# Patient Record
Sex: MALE | Race: WHITE | NOT HISPANIC OR LATINO | Employment: UNEMPLOYED | URBAN - NONMETROPOLITAN AREA
[De-identification: names, ages, dates, MRNs, and addresses within clinical notes are randomized per-mention and may not be internally consistent; named-entity substitution may affect disease eponyms.]

---

## 2021-04-16 ENCOUNTER — TRANSFERRED RECORDS (OUTPATIENT)
Dept: HEALTH INFORMATION MANAGEMENT | Facility: OTHER | Age: 66
End: 2021-04-16

## 2021-06-14 DIAGNOSIS — Z86.73 HISTORY OF STROKE: ICD-10-CM

## 2021-06-14 DIAGNOSIS — I25.10 CORONARY ARTERY DISEASE INVOLVING NATIVE CORONARY ARTERY OF NATIVE HEART WITHOUT ANGINA PECTORIS: ICD-10-CM

## 2021-06-14 DIAGNOSIS — Z13.6 SCREENING FOR AAA (ABDOMINAL AORTIC ANEURYSM): ICD-10-CM

## 2021-06-14 DIAGNOSIS — Z86.74 H/O CARDIAC ARREST: Primary | ICD-10-CM
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
## 2021-06-15 RX ORDER — ACETAMINOPHEN 325 MG/1
325-650 TABLET ORAL EVERY 4 HOURS PRN
COMMUNITY
End: 2023-01-01

## 2021-06-15 RX ORDER — ASPIRIN 81 MG/1
81 TABLET ORAL DAILY
COMMUNITY
End: 2021-08-02

## 2021-06-15 RX ORDER — ATORVASTATIN CALCIUM 80 MG/1
80 TABLET, FILM COATED ORAL AT BEDTIME
COMMUNITY
End: 2023-01-01

## 2021-06-15 RX ORDER — LISINOPRIL 40 MG/1
40 TABLET ORAL DAILY
COMMUNITY
End: 2021-06-17

## 2021-06-15 RX ORDER — WARFARIN SODIUM 2 MG/1
TABLET ORAL
COMMUNITY
End: 2021-12-30

## 2021-06-15 RX ORDER — CHLORTHALIDONE 25 MG/1
12.5 TABLET ORAL DAILY
COMMUNITY
End: 2021-06-17

## 2021-06-15 RX ORDER — GLIPIZIDE 10 MG/1
10 TABLET ORAL
COMMUNITY
End: 2021-06-17

## 2021-06-17 ENCOUNTER — OFFICE VISIT (OUTPATIENT)
Dept: CARDIOLOGY | Facility: OTHER | Age: 66
End: 2021-06-17
Attending: INTERNAL MEDICINE
Payer: COMMERCIAL

## 2021-06-17 VITALS
DIASTOLIC BLOOD PRESSURE: 60 MMHG | RESPIRATION RATE: 18 BRPM | BODY MASS INDEX: 28.77 KG/M2 | TEMPERATURE: 99 F | SYSTOLIC BLOOD PRESSURE: 110 MMHG | HEIGHT: 70 IN | HEART RATE: 84 BPM | OXYGEN SATURATION: 96 % | WEIGHT: 201 LBS

## 2021-06-17 DIAGNOSIS — Z86.2 HISTORY OF ITP: ICD-10-CM

## 2021-06-17 DIAGNOSIS — I65.23 BILATERAL CAROTID ARTERY STENOSIS: ICD-10-CM

## 2021-06-17 DIAGNOSIS — I25.10 CORONARY ARTERY DISEASE INVOLVING NATIVE CORONARY ARTERY OF NATIVE HEART WITHOUT ANGINA PECTORIS: ICD-10-CM

## 2021-06-17 DIAGNOSIS — Z13.6 ENCOUNTER FOR SCREENING FOR ABDOMINAL AORTIC ANEURYSM (AAA) IN PATIENT 50 YEARS OF AGE OR OLDER WITHOUT OTHER RISK FACTORS FOR AAA: ICD-10-CM

## 2021-06-17 DIAGNOSIS — N18.6 ESRD (END STAGE RENAL DISEASE) ON DIALYSIS (H): ICD-10-CM

## 2021-06-17 DIAGNOSIS — E66.01 MORBID OBESITY (H): ICD-10-CM

## 2021-06-17 DIAGNOSIS — E55.9 VITAMIN D DEFICIENCY: ICD-10-CM

## 2021-06-17 DIAGNOSIS — E53.8 VITAMIN B12 DEFICIENCY (NON ANEMIC): ICD-10-CM

## 2021-06-17 DIAGNOSIS — I73.9 PAD (PERIPHERAL ARTERY DISEASE) (H): ICD-10-CM

## 2021-06-17 DIAGNOSIS — E78.1 HYPERTRIGLYCERIDEMIA: ICD-10-CM

## 2021-06-17 DIAGNOSIS — J44.9 CHRONIC OBSTRUCTIVE PULMONARY DISEASE, UNSPECIFIED COPD TYPE (H): ICD-10-CM

## 2021-06-17 DIAGNOSIS — R53.1 LEFT-SIDED WEAKNESS: ICD-10-CM

## 2021-06-17 DIAGNOSIS — Z87.891 HISTORY OF TOBACCO ABUSE: ICD-10-CM

## 2021-06-17 DIAGNOSIS — I50.42 CHRONIC COMBINED SYSTOLIC AND DIASTOLIC CONGESTIVE HEART FAILURE (H): ICD-10-CM

## 2021-06-17 DIAGNOSIS — Z86.73 HISTORY OF CVA (CEREBROVASCULAR ACCIDENT): ICD-10-CM

## 2021-06-17 DIAGNOSIS — I25.2 HISTORY OF ST ELEVATION MYOCARDIAL INFARCTION (STEMI): ICD-10-CM

## 2021-06-17 DIAGNOSIS — L97.529 ULCER OF LEFT FOOT, UNSPECIFIED ULCER STAGE (H): ICD-10-CM

## 2021-06-17 DIAGNOSIS — U07.1 CLINICAL DIAGNOSIS OF COVID-19: ICD-10-CM

## 2021-06-17 DIAGNOSIS — Z86.74 H/O CARDIAC ARREST: Primary | ICD-10-CM

## 2021-06-17 DIAGNOSIS — Z99.2 ESRD (END STAGE RENAL DISEASE) ON DIALYSIS (H): ICD-10-CM

## 2021-06-17 DIAGNOSIS — I27.20 MILD PULMONARY HYPERTENSION (H): ICD-10-CM

## 2021-06-17 DIAGNOSIS — I10 BENIGN ESSENTIAL HYPERTENSION: ICD-10-CM

## 2021-06-17 DIAGNOSIS — G47.33 OSA (OBSTRUCTIVE SLEEP APNEA): ICD-10-CM

## 2021-06-17 DIAGNOSIS — E11.65 TYPE 2 DIABETES MELLITUS WITH HYPERGLYCEMIA, WITHOUT LONG-TERM CURRENT USE OF INSULIN (H): ICD-10-CM

## 2021-06-17 LAB — INTERPRETATION ECG - MUSE: NORMAL

## 2021-06-17 PROCEDURE — 99417 PROLNG OP E/M EACH 15 MIN: CPT | Performed by: INTERNAL MEDICINE

## 2021-06-17 PROCEDURE — G0463 HOSPITAL OUTPT CLINIC VISIT: HCPCS

## 2021-06-17 PROCEDURE — G0463 HOSPITAL OUTPT CLINIC VISIT: HCPCS | Mod: 25

## 2021-06-17 PROCEDURE — 93005 ELECTROCARDIOGRAM TRACING: CPT

## 2021-06-17 PROCEDURE — 93010 ELECTROCARDIOGRAM REPORT: CPT | Performed by: INTERNAL MEDICINE

## 2021-06-17 PROCEDURE — 99205 OFFICE O/P NEW HI 60 MIN: CPT | Performed by: INTERNAL MEDICINE

## 2021-06-17 RX ORDER — CLOPIDOGREL BISULFATE 75 MG/1
75 TABLET ORAL EVERY MORNING
COMMUNITY
End: 2021-08-06

## 2021-06-17 RX ORDER — LANOLIN ALCOHOL/MO/W.PET/CERES
1 CREAM (GRAM) TOPICAL
COMMUNITY
End: 2023-01-01

## 2021-06-17 RX ORDER — POLYETHYLENE GLYCOL 3350 17 G/17G
1 POWDER, FOR SOLUTION ORAL DAILY PRN
COMMUNITY
End: 2023-01-01

## 2021-06-17 RX ORDER — NITROGLYCERIN 0.4 MG/1
0.4 TABLET SUBLINGUAL EVERY 5 MIN PRN
COMMUNITY
End: 2021-08-02

## 2021-06-17 RX ORDER — METOPROLOL TARTRATE 25 MG/1
12.5 TABLET, FILM COATED ORAL 2 TIMES DAILY
COMMUNITY
End: 2021-08-02

## 2021-06-17 SDOH — HEALTH STABILITY: MENTAL HEALTH: HOW OFTEN DO YOU HAVE A DRINK CONTAINING ALCOHOL?: NEVER

## 2021-06-17 ASSESSMENT — PAIN SCALES - GENERAL: PAINLEVEL: NO PAIN (0)

## 2021-06-17 ASSESSMENT — MIFFLIN-ST. JEOR: SCORE: 1692.98

## 2021-06-17 NOTE — PATIENT INSTRUCTIONS
You were seen by  Nick Wallace,      1. Echocardiogram has been ordered. You will be called to schedule this.  You will receive instructions for testing at that time.  You will be contacted with results.     2. Ultrasound of carotid artery (bilateral) has been ordered. You will be called to schedule this.  You will receive instructions for testing at that time.  You will be contacted with results.    3. An ultrasound of your abdomen for aorta screening has been ordered. You will be called to schedule this. You will receive instructions for testing at that time. You will be contacted with results.    4. PFT's (Pulmonary function test) has been ordered. You will be called to schedule this.  You will receive instructions for testing at that time.  You will be contacted with results.    5. A referral has been placed for you to have a sleep study and consult with Dr. Butcher. You will be contacted to schedule these appointments.     6. Please establish care with an internal medicine doctor so you have a primary care provider.    7. You will be contacted to set up a telemed visit at [facility] to see Dr. Toro.  Please check in at [address] for this appointment.    You will follow up with [facility] , sooner if needed.     Please call the cardiology office with problems after testing is done and you've seen Dr. Toro, questions, or concerns at [phone].    If you experience chest pain, chest pressure, chest tightness, shortness of breath, fainting, lightheadedness, nausea, vomiting, or other concerning symptoms, please report to the Emergency Department or call 911. These symptoms may be emergent, and best treated in the Emergency Department.     Cardiology Nurses  SARA Doherty, SARA Amanda, TANVIN  Indy, LPN  [facility] ([address])  [phone]

## 2021-06-17 NOTE — NURSING NOTE
"Patient comes in for consult from hospital stay.  Treasure Meza LPN ....................6/17/2021   9:22 AM  Chief Complaint   Patient presents with     Consult For     hospital follow up       Initial /60 (BP Location: Right arm, Patient Position: Sitting, Cuff Size: Adult Large)   Pulse 84   Temp 99  F (37.2  C) (Tympanic)   Resp 18   Ht 1.778 m (5' 10\")   Wt 91.2 kg (201 lb)   SpO2 96%   BMI 28.84 kg/m   Estimated body mass index is 28.84 kg/m  as calculated from the following:    Height as of this encounter: 1.778 m (5' 10\").    Weight as of this encounter: 91.2 kg (201 lb).  Meds Reconciled: complete  Pt is on Aspirin  Pt is on a Statin  PHQ and/or RANJAN reviewed. Pt referred to PCP/MH Provider as appropriate.    Treasrue Meza LPN      "

## 2021-06-24 ENCOUNTER — ALLIED HEALTH/NURSE VISIT (OUTPATIENT)
Dept: CARDIOLOGY | Facility: OTHER | Age: 66
End: 2021-06-24
Attending: THORACIC SURGERY (CARDIOTHORACIC VASCULAR SURGERY)
Payer: COMMERCIAL

## 2021-06-24 ENCOUNTER — OFFICE VISIT (OUTPATIENT)
Dept: CARDIOLOGY | Facility: CLINIC | Age: 66
End: 2021-06-24
Attending: THORACIC SURGERY (CARDIOTHORACIC VASCULAR SURGERY)
Payer: COMMERCIAL

## 2021-06-24 VITALS
OXYGEN SATURATION: 100 % | WEIGHT: 202.3 LBS | RESPIRATION RATE: 12 BRPM | BODY MASS INDEX: 28.32 KG/M2 | DIASTOLIC BLOOD PRESSURE: 66 MMHG | HEIGHT: 71 IN | TEMPERATURE: 98 F | SYSTOLIC BLOOD PRESSURE: 134 MMHG | HEART RATE: 76 BPM

## 2021-06-24 VITALS
OXYGEN SATURATION: 100 % | HEART RATE: 76 BPM | HEIGHT: 71 IN | RESPIRATION RATE: 12 BRPM | WEIGHT: 202.3 LBS | SYSTOLIC BLOOD PRESSURE: 134 MMHG | TEMPERATURE: 98 F | BODY MASS INDEX: 28.32 KG/M2 | DIASTOLIC BLOOD PRESSURE: 66 MMHG

## 2021-06-24 DIAGNOSIS — I25.10 CORONARY ARTERY DISEASE INVOLVING NATIVE CORONARY ARTERY OF NATIVE HEART WITHOUT ANGINA PECTORIS: Primary | ICD-10-CM

## 2021-06-24 PROCEDURE — 99204 OFFICE O/P NEW MOD 45 MIN: CPT | Mod: TEL | Performed by: THORACIC SURGERY (CARDIOTHORACIC VASCULAR SURGERY)

## 2021-06-24 PROCEDURE — G0463 HOSPITAL OUTPT CLINIC VISIT: HCPCS | Mod: GT

## 2021-06-24 ASSESSMENT — MIFFLIN-ST. JEOR
SCORE: 1714.76
SCORE: 1714.76

## 2021-06-24 ASSESSMENT — PAIN SCALES - GENERAL
PAINLEVEL: NO PAIN (0)
PAINLEVEL: NO PAIN (0)

## 2021-06-24 NOTE — NURSING NOTE
Dr. Toro's office will be in touch after patient has his upcoming testing completed.  Mai Aggarwal RN......June 24, 2021...2:42 PM
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
## 2021-06-24 NOTE — NURSING NOTE
Patient seen today for consultation for CABG consult.    Surgery procedure explained to patient and family member and all questions and concerns were answered and addressed.     Risks and benefits of surgery were discussed with patient (and all present) including risk of death, stroke, bleeding, cardiac ischemia, wound infection, renal failure, arrhythmias and possible pacemaker implantation. Patient accepts these risks and is willing to proceed with surgery.     Reviewed pre surgery tests and procedures needed and will call patient to schedule.      Pre surgery preparation folder with instructions for surgery preparation and recovery will be mailed to the patient.     Instructed patient on having to come to [facility] for surgery.    Patient verbalized understanding of all instructions and will call with any questions or concerns.
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
## 2021-06-24 NOTE — NURSING NOTE
"Denies chest pain/pressure, shortness of breath, lightheadedness, nausea, increased fatigue, syncope or pre-syncope.    Chief Complaint   Patient presents with     Telemedicine consult     discuss need for bypass       Initial /66 (BP Location: Right arm, Patient Position: Sitting, Cuff Size: Adult Regular)   Pulse 76   Temp 98  F (36.7  C) (Temporal)   Resp 12   Ht 1.803 m (5' 11\")   Wt 91.8 kg (202 lb 4.8 oz)   SpO2 100%   BMI 28.22 kg/m   Estimated body mass index is 28.22 kg/m  as calculated from the following:    Height as of this encounter: 1.803 m (5' 11\").    Weight as of this encounter: 91.8 kg (202 lb 4.8 oz).  Medication Reconciliation: complete    Mai Aggarwal RN  "

## 2021-06-25 ENCOUNTER — CARE COORDINATION (OUTPATIENT)
Dept: CARDIOLOGY | Facility: CLINIC | Age: 66
End: 2021-06-25

## 2021-06-25 DIAGNOSIS — I73.9 PAD (PERIPHERAL ARTERY DISEASE) (H): Primary | ICD-10-CM

## 2021-06-25 DIAGNOSIS — Z79.01 LONG TERM CURRENT USE OF ANTICOAGULANT THERAPY: ICD-10-CM

## 2021-06-25 DIAGNOSIS — Z01.810 PRE-OPERATIVE CARDIOVASCULAR EXAMINATION: ICD-10-CM

## 2021-06-25 DIAGNOSIS — I25.10 CORONARY ARTERY DISEASE INVOLVING NATIVE CORONARY ARTERY OF NATIVE HEART WITHOUT ANGINA PECTORIS: ICD-10-CM

## 2021-06-28 ENCOUNTER — OFFICE VISIT (OUTPATIENT)
Dept: INTERNAL MEDICINE | Facility: OTHER | Age: 66
End: 2021-06-28
Attending: NURSE PRACTITIONER
Payer: COMMERCIAL

## 2021-06-28 VITALS
TEMPERATURE: 97.3 F | WEIGHT: 204.2 LBS | BODY MASS INDEX: 28.48 KG/M2 | SYSTOLIC BLOOD PRESSURE: 130 MMHG | OXYGEN SATURATION: 97 % | RESPIRATION RATE: 16 BRPM | DIASTOLIC BLOOD PRESSURE: 70 MMHG | HEART RATE: 80 BPM

## 2021-06-28 DIAGNOSIS — E11.65 TYPE 2 DIABETES MELLITUS WITH HYPERGLYCEMIA, WITHOUT LONG-TERM CURRENT USE OF INSULIN (H): ICD-10-CM

## 2021-06-28 DIAGNOSIS — J44.9 CHRONIC OBSTRUCTIVE PULMONARY DISEASE, UNSPECIFIED COPD TYPE (H): Primary | ICD-10-CM

## 2021-06-28 DIAGNOSIS — R53.1 LEFT-SIDED WEAKNESS: ICD-10-CM

## 2021-06-28 DIAGNOSIS — Z01.810 PRE-OPERATIVE CARDIOVASCULAR EXAMINATION: ICD-10-CM

## 2021-06-28 DIAGNOSIS — Z86.73 HISTORY OF CVA (CEREBROVASCULAR ACCIDENT): ICD-10-CM

## 2021-06-28 DIAGNOSIS — L97.529 ULCER OF LEFT FOOT, UNSPECIFIED ULCER STAGE (H): ICD-10-CM

## 2021-06-28 DIAGNOSIS — I25.2 HISTORY OF ST ELEVATION MYOCARDIAL INFARCTION (STEMI): ICD-10-CM

## 2021-06-28 DIAGNOSIS — I73.9 PAD (PERIPHERAL ARTERY DISEASE) (H): Primary | ICD-10-CM

## 2021-06-28 DIAGNOSIS — I50.42 CHRONIC COMBINED SYSTOLIC AND DIASTOLIC CONGESTIVE HEART FAILURE (H): ICD-10-CM

## 2021-06-28 DIAGNOSIS — I73.9 PAD (PERIPHERAL ARTERY DISEASE) (H): ICD-10-CM

## 2021-06-28 DIAGNOSIS — I25.10 CORONARY ARTERY DISEASE INVOLVING NATIVE CORONARY ARTERY OF NATIVE HEART WITHOUT ANGINA PECTORIS: ICD-10-CM

## 2021-06-28 DIAGNOSIS — N18.6 ESRD (END STAGE RENAL DISEASE) ON DIALYSIS (H): ICD-10-CM

## 2021-06-28 DIAGNOSIS — I27.20 MILD PULMONARY HYPERTENSION (H): ICD-10-CM

## 2021-06-28 DIAGNOSIS — I10 BENIGN ESSENTIAL HYPERTENSION: ICD-10-CM

## 2021-06-28 DIAGNOSIS — Z13.29 SCREENING FOR THYROID DISORDER: ICD-10-CM

## 2021-06-28 DIAGNOSIS — Z99.2 ESRD (END STAGE RENAL DISEASE) ON DIALYSIS (H): ICD-10-CM

## 2021-06-28 DIAGNOSIS — Z86.2 HISTORY OF ITP: ICD-10-CM

## 2021-06-28 DIAGNOSIS — G47.33 OSA (OBSTRUCTIVE SLEEP APNEA): ICD-10-CM

## 2021-06-28 DIAGNOSIS — Z86.73 HISTORY OF STROKE: ICD-10-CM

## 2021-06-28 DIAGNOSIS — Z86.74 H/O CARDIAC ARREST: ICD-10-CM

## 2021-06-28 DIAGNOSIS — I65.23 BILATERAL CAROTID ARTERY STENOSIS: ICD-10-CM

## 2021-06-28 DIAGNOSIS — E78.1 HYPERTRIGLYCERIDEMIA: ICD-10-CM

## 2021-06-28 DIAGNOSIS — Z11.59 ENCOUNTER FOR HEPATITIS C SCREENING TEST FOR LOW RISK PATIENT: ICD-10-CM

## 2021-06-28 LAB
ALBUMIN SERPL-MCNC: 3.9 G/DL (ref 3.5–5.7)
ALP SERPL-CCNC: 70 U/L (ref 34–104)
ALT SERPL W P-5'-P-CCNC: 8 U/L (ref 7–52)
ANION GAP SERPL CALCULATED.3IONS-SCNC: 13 MMOL/L (ref 3–14)
AST SERPL W P-5'-P-CCNC: 11 U/L (ref 13–39)
BASOPHILS # BLD AUTO: 0.1 10E9/L (ref 0–0.2)
BASOPHILS NFR BLD AUTO: 0.7 %
BILIRUB SERPL-MCNC: 0.5 MG/DL (ref 0.3–1)
BUN SERPL-MCNC: 59 MG/DL (ref 7–25)
CALCIUM SERPL-MCNC: 9.8 MG/DL (ref 8.6–10.3)
CHLORIDE SERPL-SCNC: 99 MMOL/L (ref 98–107)
CHOLEST SERPL-MCNC: 127 MG/DL
CO2 SERPL-SCNC: 28 MMOL/L (ref 21–31)
CREAT SERPL-MCNC: 8.12 MG/DL (ref 0.7–1.3)
DIFFERENTIAL METHOD BLD: ABNORMAL
EOSINOPHIL # BLD AUTO: 0.3 10E9/L (ref 0–0.7)
EOSINOPHIL NFR BLD AUTO: 3.7 %
ERYTHROCYTE [DISTWIDTH] IN BLOOD BY AUTOMATED COUNT: 17.9 % (ref 10–15)
GFR SERPL CREATININE-BSD FRML MDRD: 7 ML/MIN/{1.73_M2}
GLUCOSE SERPL-MCNC: 81 MG/DL (ref 70–105)
HBA1C MFR BLD: 6.1 % (ref 4–6)
HCT VFR BLD AUTO: 32.9 % (ref 40–53)
HDLC SERPL-MCNC: 37 MG/DL (ref 23–92)
HGB BLD-MCNC: 10.1 G/DL (ref 13.3–17.7)
IMM GRANULOCYTES # BLD: 0.1 10E9/L (ref 0–0.4)
IMM GRANULOCYTES NFR BLD: 0.7 %
LDLC SERPL CALC-MCNC: 63 MG/DL
LYMPHOCYTES # BLD AUTO: 2 10E9/L (ref 0.8–5.3)
LYMPHOCYTES NFR BLD AUTO: 26.1 %
MCH RBC QN AUTO: 27.2 PG (ref 26.5–33)
MCHC RBC AUTO-ENTMCNC: 30.7 G/DL (ref 31.5–36.5)
MCV RBC AUTO: 88 FL (ref 78–100)
MONOCYTES # BLD AUTO: 0.6 10E9/L (ref 0–1.3)
MONOCYTES NFR BLD AUTO: 8.4 %
NEUTROPHILS # BLD AUTO: 4.5 10E9/L (ref 1.6–8.3)
NEUTROPHILS NFR BLD AUTO: 60.4 %
NONHDLC SERPL-MCNC: 90 MG/DL
PLATELET # BLD AUTO: 190 10E9/L (ref 150–450)
POTASSIUM SERPL-SCNC: 5.1 MMOL/L (ref 3.5–5.1)
PROT SERPL-MCNC: 7.1 G/DL (ref 6.4–8.9)
RBC # BLD AUTO: 3.72 10E12/L (ref 4.4–5.9)
SODIUM SERPL-SCNC: 140 MMOL/L (ref 134–144)
TRIGL SERPL-MCNC: 135 MG/DL
TSH SERPL DL<=0.05 MIU/L-ACNC: 1.61 IU/ML (ref 0.34–5.6)
WBC # BLD AUTO: 7.5 10E9/L (ref 4–11)

## 2021-06-28 PROCEDURE — 80053 COMPREHEN METABOLIC PANEL: CPT | Mod: ZL | Performed by: NURSE PRACTITIONER

## 2021-06-28 PROCEDURE — 86803 HEPATITIS C AB TEST: CPT | Mod: ZL | Performed by: NURSE PRACTITIONER

## 2021-06-28 PROCEDURE — 85025 COMPLETE CBC W/AUTO DIFF WBC: CPT | Mod: ZL | Performed by: NURSE PRACTITIONER

## 2021-06-28 PROCEDURE — 84443 ASSAY THYROID STIM HORMONE: CPT | Mod: ZL | Performed by: NURSE PRACTITIONER

## 2021-06-28 PROCEDURE — 36415 COLL VENOUS BLD VENIPUNCTURE: CPT | Mod: ZL | Performed by: NURSE PRACTITIONER

## 2021-06-28 PROCEDURE — 80061 LIPID PANEL: CPT | Mod: ZL | Performed by: NURSE PRACTITIONER

## 2021-06-28 PROCEDURE — 83036 HEMOGLOBIN GLYCOSYLATED A1C: CPT | Mod: ZL | Performed by: NURSE PRACTITIONER

## 2021-06-28 PROCEDURE — G0463 HOSPITAL OUTPT CLINIC VISIT: HCPCS

## 2021-06-28 PROCEDURE — 99203 OFFICE O/P NEW LOW 30 MIN: CPT | Performed by: NURSE PRACTITIONER

## 2021-06-28 ASSESSMENT — ENCOUNTER SYMPTOMS
ACTIVITY CHANGE: 1
APPETITE CHANGE: 1
WEAKNESS: 1

## 2021-06-28 ASSESSMENT — PAIN SCALES - GENERAL: PAINLEVEL: NO PAIN (0)

## 2021-06-28 NOTE — NURSING NOTE
"Chief Complaint   Patient presents with     [facility]     FOOD SECURITY SCREENING QUESTIONS  Hunger Vital Signs:  Within the past 12 months we worried whether our food would run out before we got money to buy more. Never  Within the past 12 months the food we bought just didn't last and we didn't have money to get more. Never     Initial /70 (BP Location: Right arm, Patient Position: Sitting, Cuff Size: Adult Regular)   Pulse 80   Temp 97.3  F (36.3  C) (Tympanic)   Resp 16   Wt 92.6 kg (204 lb 3.2 oz)   SpO2 97%   BMI 28.48 kg/m   Estimated body mass index is 28.48 kg/m  as calculated from the following:    Height as of 6/24/21: 1.803 m (5' 11\").    Weight as of this encounter: 92.6 kg (204 lb 3.2 oz).  Medication Reconciliation: complete    Pamela Dutton LPN    "

## 2021-06-28 NOTE — PROGRESS NOTES
Spoke to patient and he would like to wait until after his 08/03 appointment with Dr Wallace to schedule surgery.  Per Dr Toro patient can wait if he chooses.  Will call patient after he see Dr Wallace on 08/03 and discuss surgery appointment.  Patient will need: Labs, EKG, CT w/o contrast, chest x-ray and vein mapping prior to surgery.  No availability for vein mapping in [facility] until late August.  Note sent to Dr Wallace with patient's request.     Scheduled surgery for August 10th and pre op for August 9th to have patient on the schedule.  In needed will reschedule.  Spoke to patient and he agreed to plan.

## 2021-06-28 NOTE — LETTER
July 2, 2021      Miguel Parisi  [address] [facility] [address]        Dear ,    We are writing to inform you of your test results.    Resulted Orders   Lipid Profile   Result Value Ref Range    Cholesterol 127 <200 mg/dL    Triglycerides 135 <150 mg/dL    HDL Cholesterol 37 23 - 92 mg/dL    LDL Cholesterol Calculated 63 <100 mg/dL      Comment:      Desirable:       <100 mg/dl    Non HDL Cholesterol 90 <130 mg/dL   Thyrotropin GH   Result Value Ref Range    Thyrotropin 1.61 0.34 - 5.60 IU/mL   Hepatitis C antibody   Result Value Ref Range    Hepatitis C Antibody Nonreactive NR^Nonreactive      Comment:      Assay performance characteristics have not been established for newborns,   infants, and children     Comprehensive metabolic panel   Result Value Ref Range    Sodium 140 134 - 144 mmol/L    Potassium 5.1 3.5 - 5.1 mmol/L    Chloride 99 98 - 107 mmol/L    Carbon Dioxide 28 21 - 31 mmol/L    Anion Gap 13 3 - 14 mmol/L    Glucose 81 70 - 105 mg/dL    Urea Nitrogen 59 (H) 7 - 25 mg/dL    Creatinine 8.12 (HH) 0.70 - 1.30 mg/dL      Comment:      Critical Value called to and read back by  LYNN RUIZ, 06.28.21, 1500, [facility]      GFR Estimate 7 (L) >60 mL/min/[1.73_m2]    GFR Estimate If Black 8 (L) >60 mL/min/[1.73_m2]    Calcium 9.8 8.6 - 10.3 mg/dL    Bilirubin Total 0.5 0.3 - 1.0 mg/dL    Albumin 3.9 3.5 - 5.7 g/dL    Protein Total 7.1 6.4 - 8.9 g/dL    Alkaline Phosphatase 70 34 - 104 U/L    ALT 8 7 - 52 U/L    AST 11 (L) 13 - 39 U/L   CBC with platelets differential   Result Value Ref Range    WBC 7.5 4.0 - 11.0 10e9/L    RBC Count 3.72 (L) 4.4 - 5.9 10e12/L    Hemoglobin 10.1 (L) 13.3 - 17.7 g/dL    Hematocrit 32.9 (L) 40.0 - 53.0 %    MCV 88 78 - 100 fl    MCH 27.2 26.5 - 33.0 pg    MCHC 30.7 (L) 31.5 - 36.5 g/dL    RDW 17.9 (H) 10.0 - 15.0 %    Platelet Count 190 150 - 450 10e9/L    Diff Method Automated Method     % Neutrophils 60.4 %    % Lymphocytes 26.1 %    % Monocytes 8.4 %    % Eosinophils 3.7  %    % Basophils 0.7 %    % Immature Granulocytes 0.7 %    Absolute Neutrophil 4.5 1.6 - 8.3 10e9/L    Absolute Lymphocytes 2.0 0.8 - 5.3 10e9/L    Absolute Monocytes 0.6 0.0 - 1.3 10e9/L    Absolute Eosinophils 0.3 0.0 - 0.7 10e9/L    Absolute Basophils 0.1 0.0 - 0.2 10e9/L    Abs Immature Granulocytes 0.1 0 - 0.4 10e9/L   Hemoglobin A1c   Result Value Ref Range    Hemoglobin A1C 6.1 (H) 4.0 - 6.0 %       If you have any questions or concerns, please call the clinic at the number listed above.       Sincerely,      Danii Ortiz NP

## 2021-06-28 NOTE — LETTER
My COPD Action Plan     Name: Miguel Parisi    YOB: 1955   Date: 6/28/2021    My doctor: Danii Ortiz NP   My clinic: [facility]    [address]  [place] [id number] [phone]  My Controller Medicine:    Dose:      My Rescue Medicine:    Dose:      My Flare Up Medicine:    Dose:      My COPD Severity: Mild = FeV1 > 80%      Use of Oxygen:      Make sure you've had your pneumonia   vaccines.          GREEN ZONE       Doing well today      Usual level of activity and exercise    Usual amount of cough and mucus    No shortness of breath    Usual level of health (thinking clearly, sleeping well, feel like eating) Actions:      Take daily medicines    Use oxygen as prescribed    Follow regular exercise and diet plan    Avoid cigarette smoke and other irritants that harm the lungs           YELLOW ZONE          Having a bad day or flare up      Short of breath more than usual    A lot more sputum (mucus) than usual    Sputum looks yellow, green, tan, brown or bloody    More coughing or wheezing    Fever or chills    Less energy; trouble completing activities    Trouble thinking or focusing    Using quick relief inhaler or nebulizer more often    Poor sleep; symptoms wake me up    Do not feel like eating Actions:      Get plenty of rest    Take daily medicines    Use quick relief inhaler every N/A hours    If you use oxygen, call you doctor to see if you should adjust your oxygen    Do breathing exercises or other things to help you relax    Let a loved one, friend or neighbor know you are feeling worse    Call your care team if you have 2 or more symptoms.  Start taking steroids or antibiotics if directed by your care team           RED ZONE       Need medical care now      Severe shortness of breath (feel you can't breathe)    Fever, chills    Not enough breath to do any activity    Trouble coughing up mucus, walking or talking    Blood in  mucus    Frequent coughing   Rescue medicines are not working    Not able to sleep because of breathing    Feel confused or drowsy    Chest pain    Actions:      Call your health care team.  If you cannot reach your care team, call 911 or go to the emergency room.        Annual Reminders:  Meet with Care Team, Flu Shot every Fall  Pharmacy: [id number] - [address]

## 2021-06-28 NOTE — PROGRESS NOTES
"Miguel Parisi  : 1955 Age: 65 year old Sex: male MRN: [id number]    CC:   Chief Complaint   Patient presents with     Establish Care     Med recheck     RANJAN Score:    No flowsheet data found.    PHQ-2 Score:     PHQ-2 (  [employer]) 2021   Q1: Little interest or pleasure in doing things 0 0   Q2: Feeling down, depressed or hopeless 0 0   PHQ-2 Score 0 0          Tobacco Use      Smoking status: Former Smoker        Packs/day: 1.00        Types: Cigarettes        Quit date: 1990        Years since quittin.5      Smokeless tobacco: Never Used    NURSE'S NOTES:    Nursing Notes:   Pamela Dutton LPN  2021  1:40 PM  Signed  Chief Complaint   Patient presents with     Establish Care     Med recheck     FOOD SECURITY SCREENING QUESTIONS  Hunger Vital Signs:  Within the past 12 months we worried whether our food would run out before we got money to buy more. Never  Within the past 12 months the food we bought just didn't last and we didn't have money to get more. Never     Initial /70 (BP Location: Right arm, Patient Position: Sitting, Cuff Size: Adult Regular)   Pulse 80   Temp 97.3  F (36.3  C) (Tympanic)   Resp 16   Wt 92.6 kg (204 lb 3.2 oz)   SpO2 97%   BMI 28.48 kg/m   Estimated body mass index is 28.48 kg/m  as calculated from the following:    Height as of 21: 1.803 m (5' 11\").    Weight as of this encounter: 92.6 kg (204 lb 3.2 oz).  Medication Reconciliation: complete    Pamela Dutton LPN       Nursing note reviewed with patient.  Accuracy and completeness verified.      SUBJECTIVE:                                                      HPI:   Miguel Parisi presents to the clinic today accompanied by his teenage grandson to establish care with the [facility] team and for medication recheck.     Has a history of end-stage renal disease currently on dialysis 3 times a week.  He does report today he is able to void on his own if he drinks enough " "water.    He has a history of left sided weakness as a result of CVA in November 2020, a previous acute ischemic stroke with L-sided numbness/ataxia. Takes a daily aspirin and warfarin 2 mg. INR is monitored currently by [facility]\".    He has a history of obesity. Has lost quite a bit of weight.    He has a history of DM2 with microalbuminuria and mild non-proliferative retinopathy. Takes a daily aspirin. Does not want to use insulin. He won't take metformin as he has diarrhea from this.  He does not check his blood sugars at home. He does report he has had episodes of low blood sugar.    He has a history of PVD s/p R SFA peroneal bypass, left foot ulcer, polyneuropathy. Takes a daily aspirin.    He has a history of KEISHA, but is not on CPAP. He is not interested in this at all. Has tried CPAP before and says it \"just don't work\". Feels he sleeps good. Uses melatonin for sleep.    He has a history of COPD related to years of smoking.  He is not on any inhalers and refuses this.    He has a history of intermittent claudication, PAD, HTN, bilateral carotid artery stenosis. He will follow with Dr. Wallace here at [facility]. Takes a daily aspirin.    He did have COVID illness in 11/20 (asymptomatic basically per wife/ never hospitalized for COVID resp symptoms).     He is on chronic anticoagulation for likely cardioembolic stroke (which was chema-his COVID illness in 11/2020). Takes a daily aspirin.     He was admitted on 4/16/2021 from ED to the cath lab with STEMI and out of hospital cardiac arrest, shock 2 times in the field for VT/VF. Under went TTM with icy cath, had cardiogenic shock vs early concomitant septic shock from asp PNA, required IMPELLA support post arrest, noted severe MV- CAD on LHC. Needs CABG but needs to medically stabilize/rehabilitate prior to that. He had a prolonged, complicated hospital course. Takes a daily aspirin.    Miguel Parisi also presents with:    Patient Active Problem List "   Diagnosis     H/O cardiac arrest     Coronary artery disease involving native coronary artery of native heart without angina pectoris     History of stroke     H/O STEMI on 4/15/2021     Benign essential hypertension     Morbid obesity (H)     Type 2 diabetes mellitus with hyperglycemia, without long-term current use of insulin (H)     Hypertriglyceridemia     History of CVA on 11/16/2020     Chronic combined systolic and diastolic congestive heart failure (H)     Clinical diagnosis of COVID-19 on 11/9/2020     ESRD (end stage renal disease) on dialysis (H)     KEISHA (obstructive sleep apnea)     Mild pulmonary hypertension (H)     PAD (peripheral artery disease) (H)     Bilateral carotid artery stenosis     History of ITP on 4/16/2021     Ulcer of left foot(H)     Left-sided weakness     Chronic obstructive pulmonary disease, unspecified COPD type (H)       Current Code Status:  No Order    REVIEW OF SYSTEMS:    Review of Systems   Constitutional: Positive for activity change (improved) and appetite change (making better food choices).   Eyes:        Last eye exam was last fall, goes annually. Robby Bee.   Cardiovascular: Positive for leg swelling.   Genitourinary:        ESRD, currently on dialysis 3x/week. Is able to make urine on his own if he drinks plenty of water.   Musculoskeletal:        Left sided weakness   Neurological: Positive for weakness (left sided due to CVA).   Psychiatric/Behavioral:        Uses melatonin for sleep.   All other systems reviewed and are negative.      Problem List/PMH: Reviewed in EMR, and made relevant updates today.  Medications: Reviewed in EMR, and made relevant updates today.  Allergies: Reviewed in EMR, and made relevant updates today.    OBJECTIVE:                                                      /70 (BP Location: Right arm, Patient Position: Sitting, Cuff Size: Adult Regular)   Pulse 80   Temp 97.3  F (36.3  C) (Tympanic)   Resp 16   Wt 92.6 kg (204 lb 3.2  oz)   SpO2 97%   BMI 28.48 kg/m      Current Pain Score:   No Pain (0)     Physical Exam  Vitals signs and nursing note reviewed.   Constitutional:       Appearance: Normal appearance.   HENT:      Head: Normocephalic and atraumatic.      Mouth/Throat:      Mouth: Mucous membranes are moist.      Pharynx: Oropharynx is clear.   Eyes:      Extraocular Movements: Extraocular movements intact.      Conjunctiva/sclera: Conjunctivae normal.      Pupils: Pupils are equal, round, and reactive to light.      Comments: Wears glasses   Cardiovascular:      Rate and Rhythm: Normal rate and regular rhythm.      Heart sounds: Normal heart sounds.      Comments: Has a dialysis access in right upper chest  Pulmonary:      Effort: Pulmonary effort is normal.      Breath sounds: Normal breath sounds.   Abdominal:      General: Bowel sounds are normal.      Palpations: Abdomen is soft.      Comments: Rounded   Musculoskeletal:      Right lower leg: Edema present.      Left lower leg: Edema present.      Comments: Sitting in wheelchair for exam exam   Skin:     General: Skin is warm and dry.   Neurological:      Mental Status: He is alert and oriented to person, place, and time. Mental status is at baseline.      Motor: Weakness present.      Gait: Gait abnormal.   Psychiatric:         Mood and Affect: Mood normal.         Behavior: Behavior normal.         Thought Content: Thought content normal.         Judgment: Judgment normal.      Diabetic foot exam: normal DP and PT pulses, no trophic changes or ulcerative lesions, normal sensory exam, normal monofilament exam and nail exam dystrophic nails, right 2nd toenail is dark red.    BP Readings from Last 3 Encounters:   06/28/21 130/70   06/24/21 134/66   06/24/21 134/66      Vitals:    06/28/21 1326   Weight: 92.6 kg (204 lb 3.2 oz)      The ASCVD Risk score ([place] MILENA Shin., et al., 2013) failed to calculate for the following reasons:    The patient has a prior MI or stroke  diagnosis    Diagnostics Completed at this Visit:    Results for orders placed or performed in visit on 06/28/21   Lipid Profile     Status: None   Result Value Ref Range    Cholesterol 127 <200 mg/dL    Triglycerides 135 <150 mg/dL    HDL Cholesterol 37 23 - 92 mg/dL    LDL Cholesterol Calculated 63 <100 mg/dL    Non HDL Cholesterol 90 <130 mg/dL   Thyrotropin GH     Status: None   Result Value Ref Range    Thyrotropin 1.61 0.34 - 5.60 IU/mL   Hepatitis C antibody     Status: None   Result Value Ref Range    Hepatitis C Antibody Nonreactive NR^Nonreactive   Comprehensive metabolic panel     Status: Abnormal   Result Value Ref Range    Sodium 140 134 - 144 mmol/L    Potassium 5.1 3.5 - 5.1 mmol/L    Chloride 99 98 - 107 mmol/L    Carbon Dioxide 28 21 - 31 mmol/L    Anion Gap 13 3 - 14 mmol/L    Glucose 81 70 - 105 mg/dL    Urea Nitrogen 59 (H) 7 - 25 mg/dL    Creatinine 8.12 (HH) 0.70 - 1.30 mg/dL    GFR Estimate 7 (L) >60 mL/min/[1.73_m2]    GFR Estimate If Black 8 (L) >60 mL/min/[1.73_m2]    Calcium 9.8 8.6 - 10.3 mg/dL    Bilirubin Total 0.5 0.3 - 1.0 mg/dL    Albumin 3.9 3.5 - 5.7 g/dL    Protein Total 7.1 6.4 - 8.9 g/dL    Alkaline Phosphatase 70 34 - 104 U/L    ALT 8 7 - 52 U/L    AST 11 (L) 13 - 39 U/L   CBC with platelets differential     Status: Abnormal   Result Value Ref Range    WBC 7.5 4.0 - 11.0 10e9/L    RBC Count 3.72 (L) 4.4 - 5.9 10e12/L    Hemoglobin 10.1 (L) 13.3 - 17.7 g/dL    Hematocrit 32.9 (L) 40.0 - 53.0 %    MCV 88 78 - 100 fl    MCH 27.2 26.5 - 33.0 pg    MCHC 30.7 (L) 31.5 - 36.5 g/dL    RDW 17.9 (H) 10.0 - 15.0 %    Platelet Count 190 150 - 450 10e9/L    Diff Method Automated Method     % Neutrophils 60.4 %    % Lymphocytes 26.1 %    % Monocytes 8.4 %    % Eosinophils 3.7 %    % Basophils 0.7 %    % Immature Granulocytes 0.7 %    Absolute Neutrophil 4.5 1.6 - 8.3 10e9/L    Absolute Lymphocytes 2.0 0.8 - 5.3 10e9/L    Absolute Monocytes 0.6 0.0 - 1.3 10e9/L    Absolute Eosinophils 0.3 0.0  - 0.7 10e9/L    Absolute Basophils 0.1 0.0 - 0.2 10e9/L    Abs Immature Granulocytes 0.1 0 - 0.4 10e9/L   Hemoglobin A1c     Status: Abnormal   Result Value Ref Range    Hemoglobin A1C 6.1 (H) 4.0 - 6.0 %      ASSESSMENT AND PLAN:    Chronic obstructive pulmonary disease, unspecified COPD type (H)  Chronic. Refuses inhalers.    Type 2 diabetes mellitus with hyperglycemia, without long-term current use of insulin (H)  - Albumin Random Urine Quantitative with Creat Ratio  - Hemoglobin A1c, slightly above goal range at 6.1.  Continue to work on this.  Patient does not want to ever take Metformin as it gave him the diarrhea.    - CBC with platelets differential, slightly decreased RBC count at 3.72, hemoglobin is down to 10.1, hematocrit is down to 32.9, MCHC is slightly low at 30.7, RDW's are slightly elevated at 17.9 otherwise unremarkable  - CBC with platelets differential    Hypertriglyceridemia  - Lipid Profile, in normal range. Recheck annually.    Coronary artery disease involving native coronary artery of native heart without angina pectoris  - Albumin Random Urine Quantitative with Creat Ratio  - Hemoglobin A1c, as above  - CBC with platelets differential, as above  - Comprehensive metabolic panel, as above  - Hepatitis C antibody, non reactive  - Thyrotropin GH, in normal range  - Lipid Profile, as above    Benign essential hypertension  - Albumin Random Urine Quantitative with Creat Ratio  - Hemoglobin A1c, as above  - CBC with platelets differential, as above  - Comprehensive metabolic panel, as above  - Hepatitis C antibody  - Thyrotropin GH, as above  - Lipid Profile, as above    Chronic combined systolic and diastolic congestive heart failure (H)  - Albumin Random Urine Quantitative with Creat Ratio  - Hemoglobin A1c, as above  - CBC with platelets differential  - Comprehensive metabolic panel  - Hepatitis C antibody, as above  - Thyrotropin GH, as above  - Lipid Profile, as above    Mild pulmonary  hypertension (H)    PAD (peripheral artery disease) (H)  - Comprehensive metabolic panel, as above    Bilateral carotid artery stenosis    Ulcer of left foot(H)  Resolved    ESRD (end stage renal disease) on dialysis (H)  - Comprehensive metabolic panel, as above  Critical creatinine today. Has dialysis later today    H/O cardiac arrest  History of stroke  H/O STEMI on 4/15/2021  History of CVA on 11/16/2020  History of ITP on 4/16/2021    Left-sided weakness    KEISHA (obstructive sleep apnea)    Screening for thyroid disorder  - Thyrotropin GH, as above. Recheck annually.    Encounter for hepatitis C screening test for low risk patient  - Hepatitis C antibody, as above       I explained my diagnostic considerations and recommendations to the patient, who voiced understanding and agreement with the treatment plan. All questions were answered. We discussed potential side effects of any prescribed or recommended therapies, as well as expectations for response to treatments.    Patient was advised to allow up to 2 days for response on lab results via MyChart and or letter to be sent.    FOLLOW-UP:    Return if symptoms worsen or fail to improve.     Clinic : [phone]  Appointment line: [phone]     VIBHA Baker, AGNP-C  Internal Medicine  07/02/2021 10:23 AM  _____________________________________________________________________________________    Total time spent with this patient was 35 minutes which included chart review, visualization and interpretation of labs and/or images, time spent with patient, and documentation.

## 2021-06-29 ENCOUNTER — TELEPHONE (OUTPATIENT)
Dept: CARDIOLOGY | Facility: OTHER | Age: 66
End: 2021-06-29

## 2021-06-29 LAB — HCV AB SERPL QL IA: NONREACTIVE

## 2021-06-29 NOTE — TELEPHONE ENCOUNTER
Wife Rosalind verified pts .  Called re:  Stress test appt for 7/15/21 at 0800.  That test per  will not be needed.  He has US tests that day.  His first appointment will be at 11:00 on 7/15/21.  Wife verbalized stress test cancelled.  So first appointment is at 11:00 on 7/15/21.

## 2021-06-29 NOTE — TELEPHONE ENCOUNTER
FUTURE VISIT INFORMATION      SURGERY INFORMATION:    Date: tbd- CardiologyRenetta Toro    Consult: ov     RECORDS REQUESTED FROM:       Primary Care Provider: Rachael    Pertinent Medical History: jaren, copd, cad, hypertension, pad, bilateral carotid artery stenosis    Most recent EKG+ Tracin21    Most recent ECHO: 21    Most recent Cardiac Stress Test: 21    Most recent PFT's: 21    Most recent Sleep Study:  2/17/15- Rachael
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
## 2021-07-02 ENCOUNTER — HOSPITAL ENCOUNTER (INPATIENT)
Facility: CLINIC | Age: 66
Setting detail: SURGERY ADMIT
End: 2021-07-02
Attending: THORACIC SURGERY (CARDIOTHORACIC VASCULAR SURGERY) | Admitting: THORACIC SURGERY (CARDIOTHORACIC VASCULAR SURGERY)
Payer: COMMERCIAL

## 2021-07-02 DIAGNOSIS — I25.10 CORONARY ARTERY DISEASE INVOLVING NATIVE CORONARY ARTERY OF NATIVE HEART WITHOUT ANGINA PECTORIS: Primary | ICD-10-CM

## 2021-07-04 DIAGNOSIS — Z11.59 ENCOUNTER FOR SCREENING FOR OTHER VIRAL DISEASES: ICD-10-CM

## 2021-07-15 ENCOUNTER — HOSPITAL ENCOUNTER (OUTPATIENT)
Dept: CARDIOLOGY | Facility: OTHER | Age: 66
End: 2021-07-15
Attending: INTERNAL MEDICINE
Payer: COMMERCIAL

## 2021-07-15 ENCOUNTER — HOSPITAL ENCOUNTER (OUTPATIENT)
Dept: ULTRASOUND IMAGING | Facility: OTHER | Age: 66
End: 2021-07-15
Attending: INTERNAL MEDICINE
Payer: COMMERCIAL

## 2021-07-15 DIAGNOSIS — I25.10 CORONARY ARTERY DISEASE INVOLVING NATIVE CORONARY ARTERY OF NATIVE HEART WITHOUT ANGINA PECTORIS: ICD-10-CM

## 2021-07-15 DIAGNOSIS — Z13.6 SCREENING FOR AAA (ABDOMINAL AORTIC ANEURYSM): ICD-10-CM

## 2021-07-15 DIAGNOSIS — Z86.73 HISTORY OF STROKE: ICD-10-CM

## 2021-07-15 DIAGNOSIS — Z86.74 H/O CARDIAC ARREST: ICD-10-CM

## 2021-07-15 LAB
BI-PLANE LVEF ECHO: NORMAL
LVEF ECHO: NORMAL

## 2021-07-15 PROCEDURE — 93880 EXTRACRANIAL BILAT STUDY: CPT

## 2021-07-15 PROCEDURE — 255N000002 HC RX 255 OP 636: Performed by: INTERNAL MEDICINE

## 2021-07-15 PROCEDURE — 93306 TTE W/DOPPLER COMPLETE: CPT | Mod: 26 | Performed by: INTERNAL MEDICINE

## 2021-07-15 PROCEDURE — 999N000208 ECHOCARDIOGRAM COMPLETE

## 2021-07-15 PROCEDURE — 76706 US ABDL AORTA SCREEN AAA: CPT

## 2021-07-15 RX ADMIN — PERFLUTREN 7 ML: 6.52 INJECTION, SUSPENSION INTRAVENOUS at 13:37

## 2021-07-15 NOTE — LETTER
July 15, 2021      Ilana Parisi  [address] [facility] [address]        Dear ,    We are writing to inform you of your test results.    Here are your echo results.  Your echo shows your heart function is severely decreased at 30-35%.  A normal heart function is between 55-70%.  This suggest you have severe heart failure.  I suspect this is in part due to all the blockages in the arteries of your heart.  I am hopeful that with bypass, your heart function will improve.  However, only time will tell.  Also, it shows that the blood pressure in the arteries of your lungs is elevated.  We call this pulmonary hypertension.  It also shows that one of your valves which is the aortic valve is mildly leaky.      Resulted Orders   Echocardiogram Complete   Result Value Ref Range    Biplane LVEF 34%     LVEF  30-35% (moderately reduced)     [facility]    [id number]  [id number]  [id number]  [id number]^GONZALO^ALEKSANDAR^KARRI     [facility]  [address] [place] [address]     Name: ILANA PARISI  MRN: [id number]  : 1955  Study Date: 07/15/2021 12:30 PM  Age: 65 yrs  Gender: Male  Patient Location: [facility]  Reason For Study: H/O cardiac arrest, Coronary artery disease involving native  cor  Ordering Physician: ALEKSANDAR SÁNCHEZ  Referring Physician: ALEKSANDAR SÁNCHEZ  Performed By: Lauren Celaya RDCS     BSA: 2.1 m2  Height: 71 in  Weight: 204 lb  BP: 130/70 mmHg  ______________________________________________________________________________  Procedure  Echocardiogram with two-dimensional, color and spectral Doppler performed.  Contrast Definity. Definity (NDC #60393-858-63) given intravenously. Patient  was given 7ml mixture of 1.5ml Definity and 8.5ml saline. 3 ml wasted. IV  start location LAC .  ______________________________________________________________________________  Interpretation Summary  Left ventricular function is moderately reduced. Left ventricular function  is  decreased; biplane LVEF is 34%. Diffuse hypokinesis, worse in the inferior  segments.  Global right ventricular function is mildly reduced.  Mild aortic insufficiency.  Estimated pulmonary artery systolic pressure is 42 mmHg plus right atrial  pressure.  IVC diameter <2.1 cm collapsing >50% with sniff suggests a normal RA pressure  of 3 mmHg.  No pericardial effusion.     There is no prior study for direct comparison. LV function is slightly lower  that what is reported on the echo from 4/25/21 at [facility] in  [facility].  ______________________________________________________________________________  Left Ventricle  Biplane LVEF is 34%. Mild concentric wall thickening consistent with left  ventricular hypertrophy is present. The Ejection Fraction was calculated using  Bi-plane contrast. Left ventricular function is decreased. The ejection  fraction is 30-35% (moderately reduced). Left ventricular diastolic function  is not assessable. Diffuse hypokinesis, worse in the inferior segments.     Right Ventricle  The right ventricle is normal size. Global right ventricular function is  mildly reduced.     Atria  Both atria appear normal.     Mitral Valve  The mitral valve is normal. Mild mitral insufficiency is present.     Aortic Valve  Moderate aortic valve calcification is present. Mild aortic insufficiency is  present.     Tricuspid Valve  The tricuspid valve is normal. Trace tricuspid insufficiency is present. Mild  pulmonary hypertension is present. Estimated pulmonary artery systolic  pressure is 42 mmHg plus right atrial pressure.     Pulmonic Valve  The pulmonic valve is normal. Trace pulmonic insufficiency is present.     Vessels  Normal diameter aortic root and proximal ascending aorta. IVC diameter <2.1 cm  collapsing >50% with sniff suggests a normal RA pressure of 3 mmHg.     Pericardium  No pericardial effusion is present.     Compared to Previous Study  There is no prior study for direct  comparison.     Attestation  I have personally viewed the imaging and agree with the interpretation and  report as documented by the fellow, Surekha Barger, and/or edited by me.  ______________________________________________________________________________  MMode/2D Measurements & Calculations     IVSd: 1.2 cm  LVIDd: 5.6 cm  LVIDs: 4.3 cm  LVPWd: 1.2 cm  FS: 23.4 %  LV mass(C)d: 282.9 grams  LV mass(C)dI: 133.0 grams/m2  Ao root diam: 3.7 cm  asc Aorta Diam: 3.5 cm  LVOT diam: 2.2 cm  LVOT area: 3.8 cm2  LA Volume (BP): 69.5 ml  LA Volume Index (BP): 32.6 ml/m2  RWT: 0.44     Doppler Measurements & Calculations  Ao V2 max: 192.0 cm/sec  Ao max PG: 15.0 mmHg  Ao V2 mean: 143.7 cm/sec  Ao mean P.0 mmHg  Ao V2 VTI: 42.0 cm  SVETLANA(I,D): 1.1 cm2  SVETLANA(V,D): 1.1 cm2  LV V1 max P.2 mmHg  LV V1 max: 55.2 cm/sec  LV V1 VTI: 11.8 cm  SV(LVOT): 44.7 ml  SI(LVOT): 21.0 ml/m2  TR max waqar: 317.0 cm/sec  TR max P.2 mmHg  AV Waqar Ratio (DI): 0.29  SVETLANA Index (cm2/m2): 0.50     ______________________________________________________________________________  Report approved by: Alvarado Connor 07/15/2021 04:01 PM             If you have any questions or concerns, please call the clinic at the number listed above.       Sincerely,      Nick Wallace, DO

## 2021-07-16 ENCOUNTER — APPOINTMENT (OUTPATIENT)
Dept: GENERAL RADIOLOGY | Facility: OTHER | Age: 66
End: 2021-07-16
Attending: STUDENT IN AN ORGANIZED HEALTH CARE EDUCATION/TRAINING PROGRAM
Payer: COMMERCIAL

## 2021-07-16 ENCOUNTER — HOSPITAL ENCOUNTER (EMERGENCY)
Facility: OTHER | Age: 66
Discharge: ANOTHER HOSPITAL | End: 2021-07-17
Attending: STUDENT IN AN ORGANIZED HEALTH CARE EDUCATION/TRAINING PROGRAM | Admitting: STUDENT IN AN ORGANIZED HEALTH CARE EDUCATION/TRAINING PROGRAM
Payer: COMMERCIAL

## 2021-07-16 DIAGNOSIS — I21.4 NSTEMI (NON-ST ELEVATED MYOCARDIAL INFARCTION) (H): ICD-10-CM

## 2021-07-16 LAB
ANION GAP SERPL CALCULATED.3IONS-SCNC: 21 MMOL/L (ref 3–14)
BASOPHILS # BLD AUTO: 0 10E3/UL (ref 0–0.2)
BASOPHILS NFR BLD AUTO: 0 %
BUN SERPL-MCNC: 84 MG/DL (ref 7–25)
CALCIUM SERPL-MCNC: 9.3 MG/DL (ref 8.6–10.3)
CHLORIDE BLD-SCNC: 100 MMOL/L (ref 98–107)
CO2 SERPL-SCNC: 18 MMOL/L (ref 21–31)
CREAT SERPL-MCNC: 8.93 MG/DL (ref 0.7–1.3)
EOSINOPHIL # BLD AUTO: 0.1 10E3/UL (ref 0–0.7)
EOSINOPHIL NFR BLD AUTO: 1 %
ERYTHROCYTE [DISTWIDTH] IN BLOOD BY AUTOMATED COUNT: 17.2 % (ref 10–15)
GFR SERPL CREATININE-BSD FRML MDRD: 5 ML/MIN/1.73M2
GLUCOSE BLD-MCNC: 158 MG/DL (ref 70–105)
HCT VFR BLD AUTO: 27.3 % (ref 40–53)
HGB BLD-MCNC: 8.4 G/DL (ref 13.3–17.7)
IMM GRANULOCYTES # BLD: 0 10E3/UL
IMM GRANULOCYTES NFR BLD: 0 %
INR PPP: 1.95 (ref 0.85–1.15)
LACTATE SERPL-SCNC: 2.2 MMOL/L (ref 0.7–2)
LYMPHOCYTES # BLD AUTO: 0.9 10E3/UL (ref 0.8–5.3)
LYMPHOCYTES NFR BLD AUTO: 10 %
MCH RBC QN AUTO: 27.8 PG (ref 26.5–33)
MCHC RBC AUTO-ENTMCNC: 30.8 G/DL (ref 31.5–36.5)
MCV RBC AUTO: 90 FL (ref 78–100)
MONOCYTES # BLD AUTO: 0.5 10E3/UL (ref 0–1.3)
MONOCYTES NFR BLD AUTO: 5 %
NEUTROPHILS # BLD AUTO: 8.2 10E3/UL (ref 1.6–8.3)
NEUTROPHILS NFR BLD AUTO: 84 %
NRBC # BLD AUTO: 0 10E3/UL
NRBC BLD AUTO-RTO: 0 /100
PLATELET # BLD AUTO: 122 10E3/UL (ref 150–450)
POTASSIUM BLD-SCNC: 5.7 MMOL/L (ref 3.5–5.1)
RBC # BLD AUTO: 3.02 10E6/UL (ref 4.4–5.9)
SODIUM SERPL-SCNC: 139 MMOL/L (ref 134–144)
TROPONIN I SERPL-MCNC: 100.3 PG/ML (ref 0–34)
TROPONIN I SERPL-MCNC: 48.3 PG/ML (ref 0–34)
WBC # BLD AUTO: 9.8 10E3/UL (ref 4–11)

## 2021-07-16 PROCEDURE — 99285 EMERGENCY DEPT VISIT HI MDM: CPT | Performed by: STUDENT IN AN ORGANIZED HEALTH CARE EDUCATION/TRAINING PROGRAM

## 2021-07-16 PROCEDURE — 96365 THER/PROPH/DIAG IV INF INIT: CPT | Performed by: STUDENT IN AN ORGANIZED HEALTH CARE EDUCATION/TRAINING PROGRAM

## 2021-07-16 PROCEDURE — 83605 ASSAY OF LACTIC ACID: CPT | Performed by: STUDENT IN AN ORGANIZED HEALTH CARE EDUCATION/TRAINING PROGRAM

## 2021-07-16 PROCEDURE — 250N000011 HC RX IP 250 OP 636: Performed by: STUDENT IN AN ORGANIZED HEALTH CARE EDUCATION/TRAINING PROGRAM

## 2021-07-16 PROCEDURE — 99285 EMERGENCY DEPT VISIT HI MDM: CPT | Mod: 25 | Performed by: STUDENT IN AN ORGANIZED HEALTH CARE EDUCATION/TRAINING PROGRAM

## 2021-07-16 PROCEDURE — 93010 ELECTROCARDIOGRAM REPORT: CPT | Performed by: INTERNAL MEDICINE

## 2021-07-16 PROCEDURE — 80048 BASIC METABOLIC PNL TOTAL CA: CPT | Performed by: STUDENT IN AN ORGANIZED HEALTH CARE EDUCATION/TRAINING PROGRAM

## 2021-07-16 PROCEDURE — 96375 TX/PRO/DX INJ NEW DRUG ADDON: CPT | Performed by: STUDENT IN AN ORGANIZED HEALTH CARE EDUCATION/TRAINING PROGRAM

## 2021-07-16 PROCEDURE — 250N000013 HC RX MED GY IP 250 OP 250 PS 637: Performed by: STUDENT IN AN ORGANIZED HEALTH CARE EDUCATION/TRAINING PROGRAM

## 2021-07-16 PROCEDURE — C9803 HOPD COVID-19 SPEC COLLECT: HCPCS | Performed by: STUDENT IN AN ORGANIZED HEALTH CARE EDUCATION/TRAINING PROGRAM

## 2021-07-16 PROCEDURE — 85025 COMPLETE CBC W/AUTO DIFF WBC: CPT | Performed by: STUDENT IN AN ORGANIZED HEALTH CARE EDUCATION/TRAINING PROGRAM

## 2021-07-16 PROCEDURE — 36415 COLL VENOUS BLD VENIPUNCTURE: CPT | Performed by: STUDENT IN AN ORGANIZED HEALTH CARE EDUCATION/TRAINING PROGRAM

## 2021-07-16 PROCEDURE — 71045 X-RAY EXAM CHEST 1 VIEW: CPT | Mod: TC

## 2021-07-16 PROCEDURE — 96366 THER/PROPH/DIAG IV INF ADDON: CPT | Performed by: STUDENT IN AN ORGANIZED HEALTH CARE EDUCATION/TRAINING PROGRAM

## 2021-07-16 PROCEDURE — 93005 ELECTROCARDIOGRAM TRACING: CPT | Performed by: STUDENT IN AN ORGANIZED HEALTH CARE EDUCATION/TRAINING PROGRAM

## 2021-07-16 PROCEDURE — 84484 ASSAY OF TROPONIN QUANT: CPT | Performed by: STUDENT IN AN ORGANIZED HEALTH CARE EDUCATION/TRAINING PROGRAM

## 2021-07-16 PROCEDURE — 85610 PROTHROMBIN TIME: CPT | Performed by: STUDENT IN AN ORGANIZED HEALTH CARE EDUCATION/TRAINING PROGRAM

## 2021-07-16 RX ORDER — CALCIUM GLUCONATE 94 MG/ML
2 INJECTION, SOLUTION INTRAVENOUS ONCE
Status: COMPLETED | OUTPATIENT
Start: 2021-07-16 | End: 2021-07-17

## 2021-07-16 RX ORDER — ASPIRIN 325 MG
325 TABLET ORAL ONCE
Status: COMPLETED | OUTPATIENT
Start: 2021-07-16 | End: 2021-07-16

## 2021-07-16 RX ORDER — CLOPIDOGREL BISULFATE 75 MG/1
75 TABLET ORAL ONCE
Status: COMPLETED | OUTPATIENT
Start: 2021-07-16 | End: 2021-07-16

## 2021-07-16 RX ORDER — NITROGLYCERIN 0.4 MG/1
0.4 TABLET SUBLINGUAL EVERY 5 MIN PRN
Status: DISCONTINUED | OUTPATIENT
Start: 2021-07-16 | End: 2021-07-17 | Stop reason: HOSPADM

## 2021-07-16 RX ADMIN — CALCIUM GLUCONATE 2 G: 98 INJECTION, SOLUTION INTRAVENOUS at 23:51

## 2021-07-16 RX ADMIN — ASPIRIN 325 MG ORAL TABLET 325 MG: 325 PILL ORAL at 22:42

## 2021-07-16 RX ADMIN — CLOPIDOGREL BISULFATE 75 MG: 75 TABLET, FILM COATED ORAL at 22:42

## 2021-07-16 ASSESSMENT — MIFFLIN-ST. JEOR: SCORE: 1727

## 2021-07-17 VITALS
HEIGHT: 71 IN | DIASTOLIC BLOOD PRESSURE: 95 MMHG | WEIGHT: 205 LBS | HEART RATE: 93 BPM | OXYGEN SATURATION: 97 % | BODY MASS INDEX: 28.7 KG/M2 | RESPIRATION RATE: 8 BRPM | SYSTOLIC BLOOD PRESSURE: 120 MMHG | TEMPERATURE: 98.3 F

## 2021-07-17 LAB
ATRIAL RATE - MUSE: 33 BPM
ATRIAL RATE - MUSE: 66 BPM
DIASTOLIC BLOOD PRESSURE - MUSE: NORMAL MMHG
DIASTOLIC BLOOD PRESSURE - MUSE: NORMAL MMHG
INTERPRETATION ECG - MUSE: NORMAL
INTERPRETATION ECG - MUSE: NORMAL
P AXIS - MUSE: NORMAL DEGREES
P AXIS - MUSE: NORMAL DEGREES
PR INTERVAL - MUSE: NORMAL MS
PR INTERVAL - MUSE: NORMAL MS
QRS DURATION - MUSE: 140 MS
QRS DURATION - MUSE: 152 MS
QT - MUSE: 396 MS
QT - MUSE: 406 MS
QTC - MUSE: 528 MS
QTC - MUSE: 536 MS
R AXIS - MUSE: 250 DEGREES
R AXIS - MUSE: 256 DEGREES
SARS-COV-2 RNA RESP QL NAA+PROBE: NEGATIVE
SYSTOLIC BLOOD PRESSURE - MUSE: NORMAL MMHG
SYSTOLIC BLOOD PRESSURE - MUSE: NORMAL MMHG
T AXIS - MUSE: 90 DEGREES
T AXIS - MUSE: 94 DEGREES
TROPONIN I SERPL-MCNC: 330 PG/ML (ref 0–34)
TROPONIN I SERPL-MCNC: 832.2 PG/ML (ref 0–34)
UFH PPP CHRO-ACNC: 0.88 IU/ML
VENTRICULAR RATE- MUSE: 105 BPM
VENTRICULAR RATE- MUSE: 107 BPM

## 2021-07-17 PROCEDURE — 93005 ELECTROCARDIOGRAM TRACING: CPT | Mod: 76 | Performed by: STUDENT IN AN ORGANIZED HEALTH CARE EDUCATION/TRAINING PROGRAM

## 2021-07-17 PROCEDURE — U0003 INFECTIOUS AGENT DETECTION BY NUCLEIC ACID (DNA OR RNA); SEVERE ACUTE RESPIRATORY SYNDROME CORONAVIRUS 2 (SARS-COV-2) (CORONAVIRUS DISEASE [COVID-19]), AMPLIFIED PROBE TECHNIQUE, MAKING USE OF HIGH THROUGHPUT TECHNOLOGIES AS DESCRIBED BY CMS-2020-01-R: HCPCS | Performed by: STUDENT IN AN ORGANIZED HEALTH CARE EDUCATION/TRAINING PROGRAM

## 2021-07-17 PROCEDURE — 250N000011 HC RX IP 250 OP 636: Performed by: STUDENT IN AN ORGANIZED HEALTH CARE EDUCATION/TRAINING PROGRAM

## 2021-07-17 PROCEDURE — 84484 ASSAY OF TROPONIN QUANT: CPT | Performed by: STUDENT IN AN ORGANIZED HEALTH CARE EDUCATION/TRAINING PROGRAM

## 2021-07-17 PROCEDURE — 93010 ELECTROCARDIOGRAM REPORT: CPT | Performed by: INTERNAL MEDICINE

## 2021-07-17 PROCEDURE — 85520 HEPARIN ASSAY: CPT | Performed by: STUDENT IN AN ORGANIZED HEALTH CARE EDUCATION/TRAINING PROGRAM

## 2021-07-17 PROCEDURE — 36415 COLL VENOUS BLD VENIPUNCTURE: CPT | Performed by: STUDENT IN AN ORGANIZED HEALTH CARE EDUCATION/TRAINING PROGRAM

## 2021-07-17 PROCEDURE — 36416 COLLJ CAPILLARY BLOOD SPEC: CPT | Performed by: STUDENT IN AN ORGANIZED HEALTH CARE EDUCATION/TRAINING PROGRAM

## 2021-07-17 PROCEDURE — 250N000013 HC RX MED GY IP 250 OP 250 PS 637: Performed by: STUDENT IN AN ORGANIZED HEALTH CARE EDUCATION/TRAINING PROGRAM

## 2021-07-17 RX ORDER — HEPARIN SODIUM 10000 [USP'U]/100ML
0-5000 INJECTION, SOLUTION INTRAVENOUS CONTINUOUS
Status: DISCONTINUED | OUTPATIENT
Start: 2021-07-17 | End: 2021-07-17 | Stop reason: HOSPADM

## 2021-07-17 RX ORDER — HEPARIN SODIUM 5000 [USP'U]/.5ML
60 INJECTION, SOLUTION INTRAVENOUS; SUBCUTANEOUS ONCE
Status: COMPLETED | OUTPATIENT
Start: 2021-07-17 | End: 2021-07-17

## 2021-07-17 RX ORDER — ONDANSETRON 2 MG/ML
4 INJECTION INTRAMUSCULAR; INTRAVENOUS ONCE
Status: COMPLETED | OUTPATIENT
Start: 2021-07-17 | End: 2021-07-17

## 2021-07-17 RX ADMIN — NITROGLYCERIN 0.4 MG: 0.4 TABLET SUBLINGUAL at 00:09

## 2021-07-17 RX ADMIN — HEPARIN SODIUM 1100 UNITS/HR: 10000 INJECTION, SOLUTION INTRAVENOUS at 02:02

## 2021-07-17 RX ADMIN — ONDANSETRON 4 MG: 2 INJECTION INTRAMUSCULAR; INTRAVENOUS at 00:03

## 2021-07-17 RX ADMIN — HEPARIN SODIUM 5600 UNITS: 10000 INJECTION, SOLUTION INTRAVENOUS; SUBCUTANEOUS at 02:01

## 2021-07-17 NOTE — ED PROVIDER NOTES
History     Chief Complaint   Patient presents with     Arm Pain     HPI    Miguel Parisi is a 65 year old male with hypertension, V. fib arrest, type 2 diabetes mellitus, CAD, STEMI, cardiac arrest, end-stage renal disease on dialysis, decreased cardiac function who presents with right-sided arm pain.  Patient was working in his garage and exerting himself by moving and organizing things in his garage that were quite heavy.  He developed shortness of breath, diaphoresis, and right forearm and arm pain.  Does not remember his symptoms prior to his cardiac arrest in the past.  He has been compliant with his warfarin, Plavix, and aspirin but he has not yet taken these today.  In addition he had missed dialysis this Thursday which is yesterday, stated that he had plenty of appointments which made unable to do this.  Denies any shortness of breath, chest pain, headache, blurry vision, ataxia, pleuritic pain, extremity pain, nausea, vomiting, cough, diarrhea, or urinary symptoms.  He does produce urine still despite his ESRD status.    Allergies:  Allergies   Allergen Reactions     Metformin Diarrhea       Problem List:    Patient Active Problem List    Diagnosis Date Noted     H/O STEMI on 4/15/2021 06/17/2021     Priority: Medium     Benign essential hypertension 06/17/2021     Priority: Medium     Morbid obesity (H) 06/17/2021     Priority: Medium     Type 2 diabetes mellitus with hyperglycemia, without long-term current use of insulin (H) 06/17/2021     Priority: Medium     Hypertriglyceridemia 06/17/2021     Priority: Medium     History of CVA on 11/16/2020 06/17/2021     Priority: Medium     Chronic combined systolic and diastolic congestive heart failure (H) 06/17/2021     Priority: Medium     Clinical diagnosis of COVID-19 on 11/9/2020 06/17/2021     Priority: Medium     ESRD (end stage renal disease) on dialysis (H) 06/17/2021     Priority: Medium     KEISHA (obstructive sleep apnea) 06/17/2021     Priority:  "Medium     Mild pulmonary hypertension (H) 2021     Priority: Medium     PAD (peripheral artery disease) (H) 2021     Priority: Medium     Bilateral carotid artery stenosis 2021     Priority: Medium     History of ITP on 2021     Priority: Medium     Ulcer of left foot(H) 2021     Priority: Medium     Left-sided weakness 2021     Priority: Medium     Chronic obstructive pulmonary disease, unspecified COPD type (H) 2021     Priority: Medium     H/O cardiac arrest 2021     Priority: Medium     Coronary artery disease involving native coronary artery of native heart without angina pectoris 2021     Priority: Medium     History of stroke 2021     Priority: Medium        Past Medical History:    History reviewed. No pertinent past medical history.    Past Surgical History:    History reviewed. No pertinent surgical history.    Family History:    History reviewed. No pertinent family history.    Social History:  Marital Status:   [2]  Social History     Tobacco Use     Smoking status: Former Smoker     Packs/day: 1.00     Types: Cigarettes     Quit date: 1990     Years since quittin.5     Smokeless tobacco: Never Used   Substance Use Topics     Alcohol use: Never     Drug use: None        Medications:    aspirin 81 MG EC tablet  atorvastatin (LIPITOR) 80 MG tablet  clopidogrel (PLAVIX) 75 MG tablet  warfarin ANTICOAGULANT (COUMADIN) 2 MG tablet  acetaminophen (TYLENOL) 325 MG tablet  insulin aspart (NOVOLOG VIAL) 100 UNITS/ML vial  melatonin 3 MG tablet  metoprolol tartrate (LOPRESSOR) 25 MG tablet  nitroGLYcerin (NITROSTAT) 0.4 MG sublingual tablet  polyethylene glycol (MIRALAX) 17 g packet          Review of Systems  All systems reviewed and other than pertinent positives and negatives in HPI, all other systems are negaive      Physical Exam   BP: 120/75  Pulse: 106  Temp: 98.3  F (36.8  C)  Resp: 16  Height: 180.3 cm (5' 11\")  Weight: " "93 kg (205 lb)  SpO2: 93 %    Vitals: /77   Pulse 98   Temp 98.3  F (36.8  C) (Tympanic)   Resp 21   Ht 1.803 m (5' 11\")   Wt 93 kg (205 lb)   SpO2 90%   BMI 28.59 kg/m    Constitutional: awake, NAD  Eyes: No conjunctival injection and normal lids, PERRL, EOMI  ENT: external nose and ears atraumatic  Neck: Symmetric, trachea midline, Supple  CV: RRR, no murmurs appreciated.  Pulm: Unlabored respiratory effort, good air movement, CTAB, no w/c/r appreciated.  GI: Soft, nontender, nondistended.  No rebound or gaurding.  MSK: No deformities.  No cyanosis.  -Right lower extremity surgical scars, appear old and well.  Neuro: AOx4, normal speech, following commands, grossly symmetric strength in all extremities.  Cranial nerves III-XII grossly intact.   Skin: warm & dry, no rashes or lesions  Psych: Appropriate mood & affect.      Assessments & Plan (with Medical Decision Making)   Ailin is a 65 year old male with hypertension, V. fib arrest, type 2 diabetes mellitus, CAD, STEMI, cardiac arrest, end-stage renal disease on dialysis, decreased cardiac function who presents with right-sided arm pain.      ED Course  ED Course as of Jul 17 0542   Fri Jul 16, 2021 2234 Echocardiogram 7/16/2021    Interpretation Summary  Left ventricular function is moderately reduced. Left ventricular function is  decreased; biplane LVEF is 34%. Diffuse hypokinesis, worse in the inferior  segments.  Global right ventricular function is mildly reduced.  Mild aortic insufficiency.  Estimated pulmonary artery systolic pressure is 42 mmHg plus right atrial  pressure.  IVC diameter <2.1 cm collapsing >50% with sniff suggests a normal RA pressure  of 3 mmHg.  No pericardial effusion.     There is no prior study for direct comparison. LV function is slightly lower  that what is reported on the echo from 4/25/21 at [facility] in  [facility].      Sat Jul 17, 2021   0142 Dr. Polanco cardiologist at the [facility] " recommends heparin infusion, no beds at the [facility] or at [facility].        0147 Troponin(!): 330.0   0151 [facility] on divert.      0151 [facility] on divert.       0200 [facility] contacted, awaiting call for bed placement.       0337 Troponin(!!): 832.2   0337 [facility] called, pending call back.      0505 Dr. Willoughby (cardiologist) accepting.  Dr. Denney (accepting physician) accepted to patient.           Blood pressure of 120/75, temperature 98.3, pulse of 98-1 06, aspirated 21-25 saturating 90 to 99% on room air.  Patient was given aspirin 325 mg, Plavix 75 mg, and nitroglycerin 1 sublingual tablet with resolution of his right arm pain and improvement of shortness of breath.  His initial troponin was 40, second troponin raised to 100, 3330, a fourth was 832.  His INR was 1.95, subtherapeutic.  He hemoglobin 8.4, blood count 9.8.  His creatinine is 8.93, did not dialyze on Thursday which he was supposed to.  Denies feeling like he is volume up, still produces urine, glucose 158, gap 18, potassium 5.7.  He was given 2 g of calcium gluconate.  Sodium 139.  Covid test negative.  After discussion with [facility] cardiology, recommend admission unfortunately are full.  I called [facility] who unfortunately on diversion.  His EKG showed stable right bundle branch block compared to previous, however after nitroglycerin had relieved his right arm pain shortness of breath I repeated the EKG which revealed high lateral and left sided precordial lead depressions concerning for subendocardial ischemia / acute myocardial injury.  Does not need emergent dialysis shifting at this time however he does need evaluation for his NSTEMI, and significantly rising troponins.  I reviewed his recent echocardiogram revealed an EF 34%, diffuse hypokinesis, worse in the inferior segments, right global ventricular function reduced, mild aortic insufficiency, no pericardial effusion. , patient was amenable this, he had no  recurrence of his right arm pain despite his elevated troponins and thus I deferred nitroglycerin.  I contacted  who had recommended [facility] in [place].  I spoken to the cardiologist who accepted the patient for transfer, patient was sent via fixed wing with paramedics.  Believe this is the best care for this patient that he will likely need dialysis today urgently but not emergently.  In addition he will need evaluation for interventional cardiology, and given the resources of [facility] it is very unsafe for him to be here if he decompensates as we have no ability to intervene.  He is amenable this plan, I spoken to his wife and his son who agreed with the plan.  The patient was also in agreement, all questions answered.  Sent in stable condition.    Plan  - Consult to Cardiology at the [facility] - recommnded heparin infusion and admission to hospital with interventional cardiology and dialysis capabilities  - Consult to [facility], accepting as above.  - Patient was told to follow up with their PCP in 2-3 days.  - Return to the emergency department if you have any worsening or concerning symptoms, or call 911.    Impression  Hypertension  V. fib arrest  type 2 diabetes mellitus  CAD  STEMI  cardiac arrest  end-stage renal disease on dialysis  NSTEMI    Disposition  Transfer to OS    I have reviewed the nursing notes.    I have reviewed the findings, diagnosis, plan and need for follow up with the patient.  Parag Garibay MD  Emergency Medicine   7/16/2021   [facility]     Brayan Garibay MD  07/17/21 0549       Brayan Garibay MD  07/17/21 0550       Brayan Garibay MD  07/17/21 0551

## 2021-07-17 NOTE — ED TRIAGE NOTES
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  Pt is scheduled to have a CABG done next month.  Pt is also a dialysis pt.  Pt denies any other pain except I his right arm..  Pre hospital personnel report vital signs of:  B/P 115/73; ,;SpO2 92 on RA  Pre Hospital Cardiac rhythm reported as Other    Patient arrives with:  GCS Eye Opening = 4=Spontaneous  Airway intact  Breathing Assessment Normal  Circulation Assessment Normal      Placed in room 903, gowned, warm blanket provided, side rails up,  ID verified and band placed, and call light within reach.       Previous living situation Other:  daughter

## 2021-07-17 NOTE — ED NOTES
Waiting to hear back from [facility] in [place].  C-4 has been trying to find available hospitals that has cardiology and dialysis.  The majority of the hospitals that fit the pt's needs are on divert at this time.

## 2021-07-17 NOTE — ED NOTES
[facility] accepted pt. Flight called and fixed wing will be here in approx. 1-1/2 hours.

## 2021-07-26 ENCOUNTER — TELEPHONE (OUTPATIENT)
Dept: INTERNAL MEDICINE | Facility: OTHER | Age: 66
End: 2021-07-26

## 2021-07-26 NOTE — TELEPHONE ENCOUNTER
MERLINE Yin or MERLINE Rust @[facility] requesting work in, surgical post op this Friday or next Monday. Please call. Thank you.  Betzy Ojeda

## 2021-07-27 NOTE — TELEPHONE ENCOUNTER
A return call was made to Callie and Dr. Yin's response was relayed.  Patient has been added to her Monday schedule.  Caitlin Dawson on 7/27/2021 at 8:34 AM

## 2021-08-02 ENCOUNTER — OFFICE VISIT (OUTPATIENT)
Dept: INTERNAL MEDICINE | Facility: OTHER | Age: 66
End: 2021-08-02
Attending: INTERNAL MEDICINE
Payer: COMMERCIAL

## 2021-08-02 ENCOUNTER — TELEPHONE (OUTPATIENT)
Dept: INTERNAL MEDICINE | Facility: OTHER | Age: 66
End: 2021-08-02

## 2021-08-02 VITALS
TEMPERATURE: 98.6 F | RESPIRATION RATE: 20 BRPM | OXYGEN SATURATION: 95 % | SYSTOLIC BLOOD PRESSURE: 138 MMHG | DIASTOLIC BLOOD PRESSURE: 80 MMHG | HEART RATE: 83 BPM

## 2021-08-02 DIAGNOSIS — D64.9 ANEMIA, UNSPECIFIED TYPE: Primary | ICD-10-CM

## 2021-08-02 DIAGNOSIS — Z99.2 ESRD (END STAGE RENAL DISEASE) ON DIALYSIS (H): ICD-10-CM

## 2021-08-02 DIAGNOSIS — N18.6 ESRD (END STAGE RENAL DISEASE) ON DIALYSIS (H): ICD-10-CM

## 2021-08-02 DIAGNOSIS — N18.6 END STAGE RENAL FAILURE ON DIALYSIS (H): ICD-10-CM

## 2021-08-02 DIAGNOSIS — Z79.899 HIGH RISK MEDICATION USE: ICD-10-CM

## 2021-08-02 DIAGNOSIS — Z95.1 S/P CABG (CORONARY ARTERY BYPASS GRAFT): ICD-10-CM

## 2021-08-02 DIAGNOSIS — Z99.2 END STAGE RENAL FAILURE ON DIALYSIS (H): ICD-10-CM

## 2021-08-02 DIAGNOSIS — I25.10 CORONARY ARTERY DISEASE INVOLVING NATIVE CORONARY ARTERY OF NATIVE HEART WITHOUT ANGINA PECTORIS: ICD-10-CM

## 2021-08-02 LAB
ALBUMIN SERPL-MCNC: 3.3 G/DL (ref 3.5–5.7)
ANION GAP SERPL CALCULATED.3IONS-SCNC: 14 MMOL/L (ref 3–14)
BUN SERPL-MCNC: 37 MG/DL (ref 7–25)
CALCIUM SERPL-MCNC: 9.1 MG/DL (ref 8.6–10.3)
CHLORIDE BLD-SCNC: 97 MMOL/L (ref 98–107)
CO2 SERPL-SCNC: 27 MMOL/L (ref 21–31)
CREAT SERPL-MCNC: 6.19 MG/DL (ref 0.7–1.3)
ERYTHROCYTE [DISTWIDTH] IN BLOOD BY AUTOMATED COUNT: 15.9 % (ref 10–15)
GFR SERPL CREATININE-BSD FRML MDRD: 9 ML/MIN/1.73M2
GLUCOSE BLD-MCNC: 117 MG/DL (ref 70–105)
HCT VFR BLD AUTO: 28.4 % (ref 40–53)
HGB BLD-MCNC: 9 G/DL (ref 13.3–17.7)
MCH RBC QN AUTO: 29.4 PG (ref 26.5–33)
MCHC RBC AUTO-ENTMCNC: 31.7 G/DL (ref 31.5–36.5)
MCV RBC AUTO: 93 FL (ref 78–100)
PHOSPHATE SERPL-MCNC: 5.1 MG/DL (ref 2.5–5)
PLATELET # BLD AUTO: 261 10E3/UL (ref 150–450)
POTASSIUM BLD-SCNC: 4.4 MMOL/L (ref 3.5–5.1)
RBC # BLD AUTO: 3.06 10E6/UL (ref 4.4–5.9)
SODIUM SERPL-SCNC: 138 MMOL/L (ref 134–144)
WBC # BLD AUTO: 10.2 10E3/UL (ref 4–11)

## 2021-08-02 PROCEDURE — 99215 OFFICE O/P EST HI 40 MIN: CPT | Performed by: INTERNAL MEDICINE

## 2021-08-02 PROCEDURE — 80069 RENAL FUNCTION PANEL: CPT | Mod: ZL | Performed by: INTERNAL MEDICINE

## 2021-08-02 PROCEDURE — G0463 HOSPITAL OUTPT CLINIC VISIT: HCPCS

## 2021-08-02 PROCEDURE — 36415 COLL VENOUS BLD VENIPUNCTURE: CPT | Mod: ZL | Performed by: INTERNAL MEDICINE

## 2021-08-02 PROCEDURE — 85014 HEMATOCRIT: CPT | Mod: ZL | Performed by: INTERNAL MEDICINE

## 2021-08-02 RX ORDER — CARVEDILOL 3.12 MG/1
3.12 TABLET ORAL 2 TIMES DAILY WITH MEALS
Qty: 180 TABLET | Refills: 0 | Status: SHIPPED | OUTPATIENT
Start: 2021-08-02 | End: 2021-10-05

## 2021-08-02 ASSESSMENT — PAIN SCALES - GENERAL: PAINLEVEL: NO PAIN (0)

## 2021-08-02 NOTE — PROGRESS NOTES
SUBJECTIVE:                                                      Patient presents for Hospital Followup Visit:    Hospital: [facility]      Date of Admission: July 17, 2021  Date of Discharge: July 28, 2021  Reason(s) for Admission: NSTEMI with coronary artery bypass graft            Problems taking medications regularly:  None       Medication changes since discharge: None       Problems adhering to non-medication therapy:  None    Summary of hospitalization:  CareEverywhere information obtained and reviewed  Diagnostic Tests/Treatments reviewed.  Follow up needed: none  Other Healthcare Providers Involved in Patient s Care:         Specialist appointment - Cardiology  Update since discharge: stable.     Patient overall has been doing well.  He is feeling good and sleeping well.  He did have some issues with hiccups for a while that caused significant sternal pain however they are better now.  He has had some soreness of his sternal although feels this is gradually improved.  He feels his incisions are healing.    He continues on Plavix.  His Plavix will be in place for 1 year postop and then replaced by aspirin.  He does need ongoing refills of the Plavix.  In addition he is on warfarin for atrial fibrillation.  It was recommended that possibly starting an ACE inhibitor would be reasonable.  He does need to be set up with cardiac rehab.    He continues on home dialysis on Tuesday, Thursday and Saturdays.  He has not had any issues with this.  He reports today that he has not ever been seen by nephrology and his dialysis has been managed in primary care.  He is open to seeing a nephrologist which is recommended.    ROS:  Pertinent ROS was performed and was negative, including for fever, chills, chest pain, shortness of breath, increased lower extremity edema, changes in bowel or bladder, blood in the stool, difficulty swallowing, sores in the mouth. No other concerns, with exception of HPI  above.     OBJECTIVE:                                                    /80   Pulse 83   Temp 98.6  F (37  C) (Tympanic)   Resp 20   SpO2 95%     GEN: Vitals reviewed. Healthy appearing. Patient is in no acute distress. Cooperative with exam.  HEENT: Normocephalic atraumatic.  Pupils equally round.  No scleral icterus, no conjunctival erythema.    CV: Heart regular in rate and rhythm with no murmur.    LUNGS: Lungs clear to auscultation bilaterally.  Chest rise equal bilaterally.  No accessory muscle use.  SKIN: Warm and dry to touch.  No rash on face, arms and legs.  Multiple incisions are reviewed including his sternal incision, abdominal laparoscopic incisions and leg incision.  Minimal to no erythema.  No drainage noted.  EXT: No clubbing or cyanosis.  No peripheral edema.  PSYCH: Mood is good.  Affect appropriate. Speech fluent. Answers questions appropriately and thought process normal.    ASSESSMENT/PLAN:                                                      1. Anemia, unspecified type  -Hemoglobin today is slightly lower than it was on discharge.  Recommend recheck in 1-2 weeks with a lab only appointment given his high risk medications.  - CBC W PLT No Diff    2. High risk medication use  - Renal Function Panel    3. Coronary artery disease involving native coronary artery of native heart without angina pectoris  -Patient appears to be doing well postop.  He will be continued on Coreg along with Plavix and warfarin.  He will be set up for cardiac rehab.  He is scheduled for echocardiogram and cardiology in 3 months.  - carvedilol (COREG) 3.125 MG tablet; Take 1 tablet (3.125 mg) by mouth 2 times daily (with meals)  Dispense: 180 tablet; Refill: 0  - CARDIAC REHAB REFERRAL; Future  - clopidogrel (PLAVIX) 75 MG tablet; Take 1 tablet (75 mg) by mouth every morning  Dispense: 90 tablet; Refill: 0    4. S/P CABG (coronary artery bypass graft)  See above  - CARDIAC REHAB REFERRAL; Future  - clopidogrel  (PLAVIX) 75 MG tablet; Take 1 tablet (75 mg) by mouth every morning  Dispense: 90 tablet; Refill: 0    5. ESRD (end stage renal disease) on dialysis (H)  -Referral placed for nephrology locally for ongoing management of dialysis and renal disease.  - Adult Nephrology Referral; Future    42 minutes spent on the date of the encounter doing chart review, history and exam, documentation and further activities as noted above        Yari Yin,   8/2/2021

## 2021-08-02 NOTE — NURSING NOTE
"Chief Complaint   Patient presents with     Hospital F/U       Initial /80   Pulse 83   Temp 98.6  F (37  C) (Tympanic)   Resp 20   SpO2 95%  Estimated body mass index is 28.59 kg/m  as calculated from the following:    Height as of 7/16/21: 1.803 m (5' 11\").    Weight as of 7/16/21: 93 kg (205 lb).  FOOD SECURITY SCREENING QUESTIONS  Hunger Vital Signs:  Within the past 12 months we worried whether our food would run out before we got money to buy more. Never  Within the past 12 months the food we bought just didn't last and we didn't have money to get more. Never        Luiz Rivero, JUAN A    "

## 2021-08-02 NOTE — PATIENT INSTRUCTIONS
Increase Miralax to twice daily after 3 days if needed - call if this doesn't help or if you have side effects.

## 2021-08-02 NOTE — TELEPHONE ENCOUNTER
Per [facility], RN.     INR reading is 1.3  On 08/02/2021  Done in his home by the RN.        Rosangela Patel on 8/2/2021 at 4:06 PM

## 2021-08-05 DIAGNOSIS — I25.10 CORONARY ARTERY DISEASE INVOLVING NATIVE CORONARY ARTERY OF NATIVE HEART WITHOUT ANGINA PECTORIS: ICD-10-CM

## 2021-08-05 DIAGNOSIS — I25.810 CORONARY ARTERY DISEASE INVOLVING CORONARY BYPASS GRAFT OF NATIVE HEART WITHOUT ANGINA PECTORIS: ICD-10-CM

## 2021-08-05 DIAGNOSIS — E78.1 HYPERTRIGLYCERIDEMIA: ICD-10-CM

## 2021-08-05 DIAGNOSIS — I10 BENIGN ESSENTIAL HYPERTENSION: ICD-10-CM

## 2021-08-05 DIAGNOSIS — Z95.1 HISTORY OF CORONARY ARTERY BYPASS GRAFT X 1: Primary | ICD-10-CM

## 2021-08-05 DIAGNOSIS — Z11.59 ENCOUNTER FOR SCREENING FOR OTHER VIRAL DISEASES: ICD-10-CM

## 2021-08-05 DIAGNOSIS — E66.01 MORBID OBESITY (H): ICD-10-CM

## 2021-08-05 DIAGNOSIS — E11.65 TYPE 2 DIABETES MELLITUS WITH HYPERGLYCEMIA, WITHOUT LONG-TERM CURRENT USE OF INSULIN (H): ICD-10-CM

## 2021-08-06 ENCOUNTER — TELEPHONE (OUTPATIENT)
Dept: INTERNAL MEDICINE | Facility: OTHER | Age: 66
End: 2021-08-06

## 2021-08-06 DIAGNOSIS — I25.2 HISTORY OF ST ELEVATION MYOCARDIAL INFARCTION (STEMI): ICD-10-CM

## 2021-08-06 DIAGNOSIS — I25.10 CORONARY ARTERY DISEASE INVOLVING NATIVE CORONARY ARTERY OF NATIVE HEART WITHOUT ANGINA PECTORIS: ICD-10-CM

## 2021-08-06 DIAGNOSIS — I73.9 PAD (PERIPHERAL ARTERY DISEASE) (H): ICD-10-CM

## 2021-08-06 DIAGNOSIS — Z86.74 H/O CARDIAC ARREST: ICD-10-CM

## 2021-08-06 DIAGNOSIS — Z95.1 S/P CABG (CORONARY ARTERY BYPASS GRAFT): Primary | ICD-10-CM

## 2021-08-06 DIAGNOSIS — Z86.2 HISTORY OF ITP: ICD-10-CM

## 2021-08-06 DIAGNOSIS — Z86.73 HISTORY OF CVA (CEREBROVASCULAR ACCIDENT): ICD-10-CM

## 2021-08-06 RX ORDER — CLOPIDOGREL BISULFATE 75 MG/1
75 TABLET ORAL EVERY MORNING
Qty: 90 TABLET | Refills: 0 | Status: SHIPPED | OUTPATIENT
Start: 2021-08-06 | End: 2022-01-11

## 2021-08-06 NOTE — RESULT ENCOUNTER NOTE
Please let patient know his results have returned. Electrolytes are ok.  Follow up with kidney doctor for dialysis and kidneys as we discussed.  Hemoglobin is down slightly from when he was in the hospital which may be due to his kidney function however because he is on 2 blood thinners, I would recommend rechecking next week some time with a lab only appointment.  (IM nursing please assist in scheduling).  Call if he has any questions.

## 2021-08-06 NOTE — TELEPHONE ENCOUNTER
Left a message on Adina's phone to call back regarding warfarin.  Norma J. Gosselin, LPN .......  8/6/2021  2:05 PM

## 2021-08-06 NOTE — TELEPHONE ENCOUNTER
Was returning your call regarding INR from this am, please call, thanks!    Thais Brito on 8/6/2021 at 3:01 PM

## 2021-08-06 NOTE — TELEPHONE ENCOUNTER
Please call [facility] - are they adjusting his warfarin?  If not, he needs to get set up with INR clinic

## 2021-08-06 NOTE — TELEPHONE ENCOUNTER
[facility] reporting INR Today 8/6/21 1.3  And on 8/2/21 was 1.3--Just an FYI  Taking 2mg of warfrin---he states he has papers from clinic to take 1.5

## 2021-08-09 ENCOUNTER — PRE VISIT (OUTPATIENT)
Dept: SURGERY | Facility: CLINIC | Age: 66
End: 2021-08-09

## 2021-08-09 ENCOUNTER — TELEPHONE (OUTPATIENT)
Dept: INTERNAL MEDICINE | Facility: OTHER | Age: 66
End: 2021-08-09

## 2021-08-09 LAB — INR (EXTERNAL): 1.2 (ref 0.9–1.1)

## 2021-08-09 NOTE — TELEPHONE ENCOUNTER
See other message.  Negin Turcios CMA([facility])..................8/9/2021   10:02 AM

## 2021-08-09 NOTE — TELEPHONE ENCOUNTER
Spoke to Aga from [facility]. She will recheck his INR today and verify his actual dose when she sees him at around noon.  She was told by [facility] that [facility] will manage his warfarin dosing.  Negin Turcios CMA([facility])..................8/9/2021   10:05 AM

## 2021-08-09 NOTE — TELEPHONE ENCOUNTER
See other messages.  Negin Turcios CMA([facility])..................8/9/2021   2:57 PM

## 2021-08-09 NOTE — TELEPHONE ENCOUNTER
Left message to call back.  Negin Turcios CMA([facility]) ....................  8/9/2021   2:57 PM

## 2021-08-09 NOTE — TELEPHONE ENCOUNTER
Current dose of Warfarin is: 3 mg on Monday and Friday, and 2 mg other days of the week.  Negin Turcios CMA([facility])..................8/9/2021   3:06 PM

## 2021-08-09 NOTE — TELEPHONE ENCOUNTER
See new note about his dose and current INR from today.  Negin Turcios CMA([facility])..................8/9/2021   2:59 PM

## 2021-08-10 ENCOUNTER — LAB (OUTPATIENT)
Dept: LAB | Facility: OTHER | Age: 66
End: 2021-08-10
Attending: INTERNAL MEDICINE
Payer: COMMERCIAL

## 2021-08-10 ENCOUNTER — ANTICOAGULATION THERAPY VISIT (OUTPATIENT)
Dept: ANTICOAGULATION | Facility: OTHER | Age: 66
End: 2021-08-10
Attending: INTERNAL MEDICINE
Payer: COMMERCIAL

## 2021-08-10 DIAGNOSIS — Z86.74 H/O CARDIAC ARREST: ICD-10-CM

## 2021-08-10 DIAGNOSIS — I25.10 CORONARY ARTERY DISEASE INVOLVING NATIVE CORONARY ARTERY OF NATIVE HEART WITHOUT ANGINA PECTORIS: ICD-10-CM

## 2021-08-10 DIAGNOSIS — I25.2 HISTORY OF ST ELEVATION MYOCARDIAL INFARCTION (STEMI): ICD-10-CM

## 2021-08-10 DIAGNOSIS — Z86.73 HISTORY OF CVA (CEREBROVASCULAR ACCIDENT): ICD-10-CM

## 2021-08-10 DIAGNOSIS — Z99.2 ESRD (END STAGE RENAL DISEASE) ON DIALYSIS (H): ICD-10-CM

## 2021-08-10 DIAGNOSIS — E11.65 TYPE 2 DIABETES MELLITUS WITH HYPERGLYCEMIA, WITHOUT LONG-TERM CURRENT USE OF INSULIN (H): ICD-10-CM

## 2021-08-10 DIAGNOSIS — Z95.1 S/P CABG (CORONARY ARTERY BYPASS GRAFT): Primary | ICD-10-CM

## 2021-08-10 DIAGNOSIS — E78.1 HYPERTRIGLYCERIDEMIA: ICD-10-CM

## 2021-08-10 DIAGNOSIS — E55.9 VITAMIN D DEFICIENCY: ICD-10-CM

## 2021-08-10 DIAGNOSIS — I50.42 CHRONIC COMBINED SYSTOLIC AND DIASTOLIC CONGESTIVE HEART FAILURE (H): ICD-10-CM

## 2021-08-10 DIAGNOSIS — I73.9 PAD (PERIPHERAL ARTERY DISEASE) (H): ICD-10-CM

## 2021-08-10 DIAGNOSIS — E53.8 VITAMIN B12 DEFICIENCY (NON ANEMIC): ICD-10-CM

## 2021-08-10 DIAGNOSIS — Z86.2 HISTORY OF ITP: ICD-10-CM

## 2021-08-10 DIAGNOSIS — N18.6 ESRD (END STAGE RENAL DISEASE) ON DIALYSIS (H): ICD-10-CM

## 2021-08-10 LAB
ALBUMIN SERPL-MCNC: 3.8 G/DL (ref 3.5–5.7)
ALP SERPL-CCNC: 79 U/L (ref 34–104)
ALT SERPL W P-5'-P-CCNC: 7 U/L (ref 7–52)
ANION GAP SERPL CALCULATED.3IONS-SCNC: 10 MMOL/L (ref 3–14)
AST SERPL W P-5'-P-CCNC: 11 U/L (ref 13–39)
BILIRUB SERPL-MCNC: 0.5 MG/DL (ref 0.3–1)
BUN SERPL-MCNC: 16 MG/DL (ref 7–25)
CALCIUM SERPL-MCNC: 9.2 MG/DL (ref 8.6–10.3)
CHLORIDE BLD-SCNC: 96 MMOL/L (ref 98–107)
CHOLEST SERPL-MCNC: 131 MG/DL
CO2 SERPL-SCNC: 31 MMOL/L (ref 21–31)
CREAT SERPL-MCNC: 2.83 MG/DL (ref 0.7–1.3)
DEPRECATED CALCIDIOL+CALCIFEROL SERPL-MC: 16 UG/L (ref 30–100)
ERYTHROCYTE [DISTWIDTH] IN BLOOD BY AUTOMATED COUNT: 15.7 % (ref 10–15)
FASTING STATUS PATIENT QL REPORTED: NORMAL
FOLATE SERPL-MCNC: 4.7 NG/ML
GFR SERPL CREATININE-BSD FRML MDRD: 22 ML/MIN/1.73M2
GLUCOSE BLD-MCNC: 142 MG/DL (ref 70–105)
HBA1C MFR BLD: 5.6 % (ref 4–6.2)
HCT VFR BLD AUTO: 33.4 % (ref 40–53)
HDLC SERPL-MCNC: 41 MG/DL (ref 23–92)
HGB BLD-MCNC: 10.4 G/DL (ref 13.3–17.7)
LDLC SERPL CALC-MCNC: 65 MG/DL
MAGNESIUM SERPL-MCNC: 1.7 MG/DL (ref 1.9–2.7)
MCH RBC QN AUTO: 29 PG (ref 26.5–33)
MCHC RBC AUTO-ENTMCNC: 31.1 G/DL (ref 31.5–36.5)
MCV RBC AUTO: 93 FL (ref 78–100)
NONHDLC SERPL-MCNC: 90 MG/DL
NT-PROBNP SERPL-MCNC: 2452 PG/ML (ref 0–100)
PLATELET # BLD AUTO: 294 10E3/UL (ref 150–450)
POTASSIUM BLD-SCNC: 3.8 MMOL/L (ref 3.5–5.1)
PROT SERPL-MCNC: 7.1 G/DL (ref 6.4–8.9)
RBC # BLD AUTO: 3.59 10E6/UL (ref 4.4–5.9)
SODIUM SERPL-SCNC: 137 MMOL/L (ref 134–144)
TRIGL SERPL-MCNC: 126 MG/DL
TSH SERPL DL<=0.005 MIU/L-ACNC: 1.87 MU/L (ref 0.4–4)
VIT B12 SERPL-MCNC: 535 PG/ML (ref 180–914)
WBC # BLD AUTO: 7.2 10E3/UL (ref 4–11)

## 2021-08-10 PROCEDURE — 83735 ASSAY OF MAGNESIUM: CPT | Mod: ZL

## 2021-08-10 PROCEDURE — 82746 ASSAY OF FOLIC ACID SERUM: CPT | Mod: ZL

## 2021-08-10 PROCEDURE — 36415 COLL VENOUS BLD VENIPUNCTURE: CPT | Mod: ZL

## 2021-08-10 PROCEDURE — 80053 COMPREHEN METABOLIC PANEL: CPT | Mod: ZL

## 2021-08-10 PROCEDURE — 83036 HEMOGLOBIN GLYCOSYLATED A1C: CPT | Mod: ZL

## 2021-08-10 PROCEDURE — 82306 VITAMIN D 25 HYDROXY: CPT | Mod: ZL

## 2021-08-10 PROCEDURE — 84443 ASSAY THYROID STIM HORMONE: CPT | Mod: ZL

## 2021-08-10 PROCEDURE — 82607 VITAMIN B-12: CPT | Mod: ZL

## 2021-08-10 PROCEDURE — 83880 ASSAY OF NATRIURETIC PEPTIDE: CPT | Mod: ZL

## 2021-08-10 PROCEDURE — 80061 LIPID PANEL: CPT | Mod: ZL

## 2021-08-10 PROCEDURE — 85027 COMPLETE CBC AUTOMATED: CPT | Mod: ZL

## 2021-08-10 NOTE — PROGRESS NOTES
ANTICOAGULATION FOLLOW-UP CLINIC VISIT    Patient Name:  Miguel Parisi  Date:  8/10/2021  Contact Type:  [facility]    SUBJECTIVE:  Patient Findings     Positives:  Hospital admission (was at [facility] - DX CABG)    Comments:  New start        Clinical Outcomes     Negatives:  Major bleeding event, Thromboembolic event, Anticoagulation-related hospital admission, Anticoagulation-related ED visit, Anticoagulation-related fatality    Comments:  New start           OBJECTIVE    Recent labs: (last 7 days)     21  1244   INR 1.2*       ASSESSMENT / PLAN  INR assessment SUB    Recheck INR In: 4 DAYS    INR Location [facility]      Anticoagulation Summary  As of 8/10/2021    INR goal:  2.0-3.0   TTR:  --   INR used for dosin.2 (2021)   Warfarin maintenance plan:  3 mg (2 mg x 1.5) every day   Full warfarin instructions:  3 mg every day   Weekly warfarin total:  21 mg   Plan last modified:  Gissel Arechiga RN (8/10/2021)   Next INR check:  2021   Priority:  Critical   Target end date:  8/10/2027    Indications    S/P CABG (coronary artery bypass graft) [Z95.1]  PAD (peripheral artery disease) (H) [I73.9]  History of CVA on 2020 [Z86.73]  History of ITP on 2021 [Z86.2]  H/O STEMI on 4/15/2021 [I25.2]  H/O cardiac arrest [Z86.74]  Coronary artery disease involving native coronary artery of native heart without angina pectoris [I25.10]             Anticoagulation Episode Summary     INR check location:      Preferred lab:      Send INR reminders to:  [facility]    Comments:        Anticoagulation Care Providers     Provider Role Specialty Phone number    JonahcarmencitastellaDanii NP Referring Internal Medicine [phone]            See the Encounter Report to view Anticoagulation Flowsheet and Dosing Calendar (Go to Encounters tab in chart review, and find the Anticoagulation Therapy Visit)        Gissel Arechiga, RN

## 2021-08-10 NOTE — TELEPHONE ENCOUNTER
Not yet. We need to sign the anticoagulation clinic order. Pended.  Negin Turcios CMA([facility])..................8/10/2021   8:48 AM

## 2021-08-10 NOTE — TELEPHONE ENCOUNTER
Noted, however, who is making adjustments to the medication?  Is it home care or do we need to get him set up with INR clinic?

## 2021-08-10 NOTE — TELEPHONE ENCOUNTER
See other note.  Negin Turcios CMA([facility])..................8/10/2021   8:49 AM

## 2021-08-12 ENCOUNTER — TELEPHONE (OUTPATIENT)
Dept: INTERNAL MEDICINE | Facility: OTHER | Age: 66
End: 2021-08-12
Payer: COMMERCIAL

## 2021-08-12 NOTE — TELEPHONE ENCOUNTER
"They do not see dialysis patient's in [facility] Nephrology clinic, but they do round on them during their dialysis so he will \"have eyes laid on him\" at some point while he is there for dialysis.  I asked them if the doctor would dictate notes and send follow up to PCP that way and she said they do document in Epic, but it might look different than a clinic note.  Negin Turcios CMA([facility])..................8/12/2021   12:12 PM   "

## 2021-08-13 ENCOUNTER — ANTICOAGULATION THERAPY VISIT (OUTPATIENT)
Dept: ANTICOAGULATION | Facility: OTHER | Age: 66
End: 2021-08-13
Attending: INTERNAL MEDICINE
Payer: COMMERCIAL

## 2021-08-13 DIAGNOSIS — I73.9 PAD (PERIPHERAL ARTERY DISEASE) (H): ICD-10-CM

## 2021-08-13 DIAGNOSIS — Z86.73 HISTORY OF CVA (CEREBROVASCULAR ACCIDENT): ICD-10-CM

## 2021-08-13 DIAGNOSIS — Z86.2 HISTORY OF ITP: ICD-10-CM

## 2021-08-13 DIAGNOSIS — Z95.1 S/P CABG (CORONARY ARTERY BYPASS GRAFT): Primary | ICD-10-CM

## 2021-08-13 DIAGNOSIS — Z86.74 H/O CARDIAC ARREST: ICD-10-CM

## 2021-08-13 DIAGNOSIS — I25.2 HISTORY OF ST ELEVATION MYOCARDIAL INFARCTION (STEMI): ICD-10-CM

## 2021-08-13 DIAGNOSIS — I25.10 CORONARY ARTERY DISEASE INVOLVING NATIVE CORONARY ARTERY OF NATIVE HEART WITHOUT ANGINA PECTORIS: ICD-10-CM

## 2021-08-13 LAB — INR (EXTERNAL): 1.3 (ref 0.9–1.1)

## 2021-08-13 NOTE — PROGRESS NOTES
ANTICOAGULATION FOLLOW-UP CLINIC VISIT    Patient Name:  Miguel Parisi  Date:  2021  Contact Type:  Telephone/ spoke with Nanette mccarthy with [facility]    SUBJECTIVE:  Patient Findings     Comments:  New start        Clinical Outcomes     Negatives:  Major bleeding event, Thromboembolic event, Anticoagulation-related hospital admission, Anticoagulation-related ED visit, Anticoagulation-related fatality    Comments:  New start           OBJECTIVE    Recent labs: (last 7 days)     21  1623   INR 1.3*       ASSESSMENT / PLAN  INR assessment SUB    Recheck INR In: 3 DAYS    INR Location [facility]      Anticoagulation Summary  As of 2021    INR goal:  2.0-3.0   TTR:  --   INR used for dosin.3 (2021)   Warfarin maintenance plan:  4 mg (2 mg x 2) every Sun, Fri, Sat; 3 mg (2 mg x 1.5) all other days   Full warfarin instructions:  4 mg every Sun, Fri, Sat; 3 mg all other days   Weekly warfarin total:  24 mg   Plan last modified:  Gissel Arechiga RN (2021)   Next INR check:  2021   Priority:  Critical   Target end date:  8/10/2027    Indications    S/P CABG (coronary artery bypass graft) [Z95.1]  PAD (peripheral artery disease) (H) [I73.9]  History of CVA on 2020 [Z86.73]  History of ITP on 2021 [Z86.2]  H/O STEMI on 4/15/2021 [I25.2]  H/O cardiac arrest [Z86.74]  Coronary artery disease involving native coronary artery of native heart without angina pectoris [I25.10]             Anticoagulation Episode Summary     INR check location:      Preferred lab:      Send INR reminders to:  [facility]    Comments:        Anticoagulation Care Providers     Provider Role Specialty Phone number    Martin Ortizhanie, NP Referring Internal Medicine [phone]            See the Encounter Report to view Anticoagulation Flowsheet and Dosing Calendar (Go to Encounters tab in chart review, and find the Anticoagulation Therapy Visit)        Gissel Arechiga RN

## 2021-08-16 ENCOUNTER — ANTICOAGULATION THERAPY VISIT (OUTPATIENT)
Dept: ANTICOAGULATION | Facility: OTHER | Age: 66
End: 2021-08-16
Attending: INTERNAL MEDICINE
Payer: COMMERCIAL

## 2021-08-16 DIAGNOSIS — Z86.73 HISTORY OF CVA (CEREBROVASCULAR ACCIDENT): ICD-10-CM

## 2021-08-16 DIAGNOSIS — I25.2 HISTORY OF ST ELEVATION MYOCARDIAL INFARCTION (STEMI): ICD-10-CM

## 2021-08-16 DIAGNOSIS — I73.9 PAD (PERIPHERAL ARTERY DISEASE) (H): ICD-10-CM

## 2021-08-16 DIAGNOSIS — Z95.1 S/P CABG (CORONARY ARTERY BYPASS GRAFT): Primary | ICD-10-CM

## 2021-08-16 DIAGNOSIS — Z86.2 HISTORY OF ITP: ICD-10-CM

## 2021-08-16 DIAGNOSIS — I25.10 CORONARY ARTERY DISEASE INVOLVING NATIVE CORONARY ARTERY OF NATIVE HEART WITHOUT ANGINA PECTORIS: ICD-10-CM

## 2021-08-16 DIAGNOSIS — Z86.74 H/O CARDIAC ARREST: ICD-10-CM

## 2021-08-16 LAB — INR (EXTERNAL): 1.3 (ref 0.9–1.1)

## 2021-08-16 NOTE — PROGRESS NOTES
ANTICOAGULATION FOLLOW-UP CLINIC VISIT    Patient Name:  Miguel Parisi  Date:  2021  Contact Type:  Telephone/ spoke with Nanette mccarthy with [facility]    SUBJECTIVE:  Patient Findings     Comments:  New start        Clinical Outcomes     Negatives:  Major bleeding event, Thromboembolic event, Anticoagulation-related hospital admission, Anticoagulation-related ED visit, Anticoagulation-related fatality    Comments:  New start           OBJECTIVE    Recent labs: (last 7 days)     21  1540   INR 1.3*       ASSESSMENT / PLAN  INR assessment SUB    Recheck INR In: 4 DAYS    INR Location [facility]      Anticoagulation Summary  As of 2021    INR goal:  2.0-3.0   TTR:  --   INR used for dosin.3 (2021)   Warfarin maintenance plan:  3 mg (2 mg x 1.5) every Thu; 6 mg (2 mg x 3) every Mon; 4 mg (2 mg x 2) all other days   Full warfarin instructions:  3 mg every Thu; 6 mg every Mon; 4 mg all other days   Weekly warfarin total:  29 mg   Plan last modified:  Gissel Arechiga RN (2021)   Next INR check:  2021   Priority:  Critical   Target end date:  8/10/2027    Indications    S/P CABG (coronary artery bypass graft) [Z95.1]  PAD (peripheral artery disease) (H) [I73.9]  History of CVA on 2020 [Z86.73]  History of ITP on 2021 [Z86.2]  H/O STEMI on 4/15/2021 [I25.2]  H/O cardiac arrest [Z86.74]  Coronary artery disease involving native coronary artery of native heart without angina pectoris [I25.10]             Anticoagulation Episode Summary     INR check location:      Preferred lab:      Send INR reminders to:  [facility]    Comments:        Anticoagulation Care Providers     Provider Role Specialty Phone number    Danii Ortiz NP Referring Internal Medicine [phone]            See the Encounter Report to view Anticoagulation Flowsheet and Dosing Calendar (Go to Encounters tab in chart review, and find the Anticoagulation Therapy Visit)        Gissel  TAYLOR Arechiga RN

## 2021-08-17 ENCOUNTER — TELEPHONE (OUTPATIENT)
Dept: CARDIAC REHAB | Facility: OTHER | Age: 66
End: 2021-08-17

## 2021-08-17 ENCOUNTER — OFFICE VISIT (OUTPATIENT)
Dept: CARDIOLOGY | Facility: OTHER | Age: 66
End: 2021-08-17
Attending: INTERNAL MEDICINE
Payer: COMMERCIAL

## 2021-08-17 ENCOUNTER — TELEPHONE (OUTPATIENT)
Dept: INTERNAL MEDICINE | Facility: OTHER | Age: 66
End: 2021-08-17

## 2021-08-17 VITALS
OXYGEN SATURATION: 98 % | SYSTOLIC BLOOD PRESSURE: 118 MMHG | HEIGHT: 71 IN | HEART RATE: 77 BPM | WEIGHT: 206 LBS | BODY MASS INDEX: 28.84 KG/M2 | TEMPERATURE: 98 F | RESPIRATION RATE: 18 BRPM | DIASTOLIC BLOOD PRESSURE: 60 MMHG

## 2021-08-17 DIAGNOSIS — E11.65 TYPE 2 DIABETES MELLITUS WITH HYPERGLYCEMIA, WITHOUT LONG-TERM CURRENT USE OF INSULIN (H): ICD-10-CM

## 2021-08-17 DIAGNOSIS — I25.810 CORONARY ARTERY DISEASE INVOLVING CORONARY BYPASS GRAFT OF NATIVE HEART WITHOUT ANGINA PECTORIS: ICD-10-CM

## 2021-08-17 DIAGNOSIS — I25.2 HISTORY OF ST ELEVATION MYOCARDIAL INFARCTION (STEMI): ICD-10-CM

## 2021-08-17 DIAGNOSIS — Z99.2 ESRD (END STAGE RENAL DISEASE) ON DIALYSIS (H): ICD-10-CM

## 2021-08-17 DIAGNOSIS — Z86.2 HISTORY OF ITP: ICD-10-CM

## 2021-08-17 DIAGNOSIS — Z87.891 HISTORY OF TOBACCO ABUSE: ICD-10-CM

## 2021-08-17 DIAGNOSIS — I10 BENIGN ESSENTIAL HYPERTENSION: ICD-10-CM

## 2021-08-17 DIAGNOSIS — Z95.1 HISTORY OF CORONARY ARTERY BYPASS GRAFT X 1: Primary | ICD-10-CM

## 2021-08-17 DIAGNOSIS — I65.23 BILATERAL CAROTID ARTERY STENOSIS: ICD-10-CM

## 2021-08-17 DIAGNOSIS — N18.6 ESRD (END STAGE RENAL DISEASE) ON DIALYSIS (H): ICD-10-CM

## 2021-08-17 DIAGNOSIS — I48.91 ON COUMADIN FOR ATRIAL FIBRILLATION (H): ICD-10-CM

## 2021-08-17 DIAGNOSIS — I48.0 PAROXYSMAL ATRIAL FIBRILLATION (H): ICD-10-CM

## 2021-08-17 DIAGNOSIS — E78.1 HYPERTRIGLYCERIDEMIA: ICD-10-CM

## 2021-08-17 DIAGNOSIS — E66.01 MORBID OBESITY (H): ICD-10-CM

## 2021-08-17 DIAGNOSIS — Z86.74 H/O CARDIAC ARREST: ICD-10-CM

## 2021-08-17 DIAGNOSIS — I25.2 H/O NON-ST ELEVATION MYOCARDIAL INFARCTION (NSTEMI): ICD-10-CM

## 2021-08-17 DIAGNOSIS — I25.10 CORONARY ARTERY DISEASE INVOLVING NATIVE CORONARY ARTERY OF NATIVE HEART WITHOUT ANGINA PECTORIS: ICD-10-CM

## 2021-08-17 DIAGNOSIS — I27.20 MILD PULMONARY HYPERTENSION (H): ICD-10-CM

## 2021-08-17 DIAGNOSIS — I50.42 CHRONIC COMBINED SYSTOLIC AND DIASTOLIC CONGESTIVE HEART FAILURE (H): ICD-10-CM

## 2021-08-17 DIAGNOSIS — U07.1 CLINICAL DIAGNOSIS OF COVID-19: ICD-10-CM

## 2021-08-17 DIAGNOSIS — R53.1 LEFT-SIDED WEAKNESS: ICD-10-CM

## 2021-08-17 DIAGNOSIS — Z71.6 TOBACCO ABUSE COUNSELING: ICD-10-CM

## 2021-08-17 DIAGNOSIS — Z79.01 ON COUMADIN FOR ATRIAL FIBRILLATION (H): ICD-10-CM

## 2021-08-17 DIAGNOSIS — J44.9 CHRONIC OBSTRUCTIVE PULMONARY DISEASE, UNSPECIFIED COPD TYPE (H): ICD-10-CM

## 2021-08-17 DIAGNOSIS — Z86.73 HISTORY OF CVA (CEREBROVASCULAR ACCIDENT): ICD-10-CM

## 2021-08-17 DIAGNOSIS — I73.9 PAD (PERIPHERAL ARTERY DISEASE) (H): ICD-10-CM

## 2021-08-17 DIAGNOSIS — Z86.73 HISTORY OF STROKE: ICD-10-CM

## 2021-08-17 DIAGNOSIS — G47.33 OSA (OBSTRUCTIVE SLEEP APNEA): ICD-10-CM

## 2021-08-17 PROCEDURE — G0463 HOSPITAL OUTPT CLINIC VISIT: HCPCS

## 2021-08-17 PROCEDURE — 99417 PROLNG OP E/M EACH 15 MIN: CPT | Performed by: INTERNAL MEDICINE

## 2021-08-17 PROCEDURE — 99215 OFFICE O/P EST HI 40 MIN: CPT | Performed by: INTERNAL MEDICINE

## 2021-08-17 RX ORDER — ASPIRIN 81 MG/1
81 TABLET, CHEWABLE ORAL DAILY
COMMUNITY
End: 2023-01-01

## 2021-08-17 RX ORDER — FUROSEMIDE 40 MG
40 TABLET ORAL DAILY
Qty: 90 TABLET | Refills: 3 | Status: SHIPPED | OUTPATIENT
Start: 2021-08-17 | End: 2021-10-05

## 2021-08-17 RX ORDER — LOSARTAN POTASSIUM 25 MG/1
25 TABLET ORAL DAILY
Qty: 90 TABLET | Refills: 3 | Status: CANCELLED | OUTPATIENT
Start: 2021-08-17

## 2021-08-17 ASSESSMENT — MIFFLIN-ST. JEOR: SCORE: 1731.28

## 2021-08-17 ASSESSMENT — PAIN SCALES - GENERAL: PAINLEVEL: NO PAIN (0)
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
## 2021-08-17 NOTE — NURSING NOTE
"Patient comes in for s/p coronary artery bypass CABG.  Treasure Meza LPN ....................8/17/2021   4:45 PM  Chief Complaint   Patient presents with     Follow Up     s/p coronary artery bypass CABG       Initial /60 (BP Location: Right arm, Patient Position: Sitting, Cuff Size: Adult Large)   Pulse 77   Temp 98  F (36.7  C) (Tympanic)   Resp 18   Ht 1.803 m (5' 10.98\")   Wt 93.4 kg (206 lb)   SpO2 98%   BMI 28.74 kg/m   Estimated body mass index is 28.74 kg/m  as calculated from the following:    Height as of this encounter: 1.803 m (5' 10.98\").    Weight as of this encounter: 93.4 kg (206 lb).  Meds Reconciled: complete  Pt is on Aspirin  Pt is on a Statin  PHQ and/or RANJAN reviewed. Pt referred to PCP/MH Provider as appropriate.    Treasure Meza LPN  FOOD SECURITY SCREENING QUESTIONS  Hunger Vital Signs:  Within the past 12 months we worried whether our food would run out before we got money to buy more. Never  Within the past 12 months the food we bought just didn't last and we didn't have money to get more. Never  Treasure Meza LPN 8/17/2021 4:45 PM      "

## 2021-08-17 NOTE — TELEPHONE ENCOUNTER
Patient verified their .  Called patient re: referral to cardiac rehab received from .  Instructed on program and goals of cardiac rehab.  Currently he has dialysis on .  Cardiac Rehab is only on .  Transportation is an issue,has homecare currently,  Will recheck with patient later in the week.  He has F/U appt with Dr. Wallace today.  States he still has foot leg swelling which makes walking difficult yet.  Instructed him to call number on back of his [employer] insurance card to check on copay for cardiac rehab.   Patient verbalized understanding.

## 2021-08-18 ENCOUNTER — TELEPHONE (OUTPATIENT)
Dept: INTERNAL MEDICINE | Facility: OTHER | Age: 66
End: 2021-08-18

## 2021-08-19 ENCOUNTER — ANTICOAGULATION THERAPY VISIT (OUTPATIENT)
Dept: ANTICOAGULATION | Facility: OTHER | Age: 66
End: 2021-08-19
Attending: INTERNAL MEDICINE
Payer: COMMERCIAL

## 2021-08-19 DIAGNOSIS — Z86.74 H/O CARDIAC ARREST: ICD-10-CM

## 2021-08-19 DIAGNOSIS — I25.2 HISTORY OF ST ELEVATION MYOCARDIAL INFARCTION (STEMI): ICD-10-CM

## 2021-08-19 DIAGNOSIS — Z86.73 HISTORY OF CVA (CEREBROVASCULAR ACCIDENT): ICD-10-CM

## 2021-08-19 DIAGNOSIS — Z86.2 HISTORY OF ITP: ICD-10-CM

## 2021-08-19 DIAGNOSIS — I73.9 PAD (PERIPHERAL ARTERY DISEASE) (H): Primary | ICD-10-CM

## 2021-08-19 DIAGNOSIS — I25.10 CORONARY ARTERY DISEASE INVOLVING NATIVE CORONARY ARTERY OF NATIVE HEART WITHOUT ANGINA PECTORIS: ICD-10-CM

## 2021-08-19 LAB — INR (EXTERNAL): 1.8 (ref 0.9–1.1)

## 2021-08-19 NOTE — PROGRESS NOTES
ANTICOAGULATION FOLLOW-UP CLINIC VISIT    Patient Name:  Miguel Parisi  Date:  2021  Contact Type:  Telephone/ spoke with Nanette mccarthy with [facility]    SUBJECTIVE:  Patient Findings     Comments:  New start        Clinical Outcomes     Negatives:  Major bleeding event, Thromboembolic event, Anticoagulation-related hospital admission, Anticoagulation-related ED visit, Anticoagulation-related fatality    Comments:  New start           OBJECTIVE    Recent labs: (last 7 days)     21  1640   INR 1.8*       ASSESSMENT / PLAN  INR assessment SUB    Recheck INR In: 4 DAYS    INR Location Clinic      Anticoagulation Summary  As of 2021    INR goal:  2.0-3.0   TTR:  --   INR used for dosin.8 (2021)   Warfarin maintenance plan:  6 mg (2 mg x 3) every Mon; 4 mg (2 mg x 2) all other days   Full warfarin instructions:  6 mg every Mon; 4 mg all other days   Weekly warfarin total:  30 mg   Plan last modified:  Gissel Arechiga RN (2021)   Next INR check:  2021   Priority:  Critical   Target end date:  8/10/2027    Indications    PAD (peripheral artery disease) (H) [I73.9]  History of CVA on 2020 [Z86.73]  History of ITP on 2021 [Z86.2]  H/O STEMI on 4/15/2021 [I25.2]  H/O cardiac arrest [Z86.74]  Coronary artery disease involving native coronary artery of native heart without angina pectoris [I25.10]             Anticoagulation Episode Summary     INR check location:      Preferred lab:      Send INR reminders to:  [facility]    Comments:        Anticoagulation Care Providers     Provider Role Specialty Phone number    Danii Ortiz NP Referring Internal Medicine [phone]            See the Encounter Report to view Anticoagulation Flowsheet and Dosing Calendar (Go to Encounters tab in chart review, and find the Anticoagulation Therapy Visit)        Gissel Arechiga, RN

## 2021-08-19 NOTE — TELEPHONE ENCOUNTER
This includes the INR draws in the other phone note.   OK?  Negin Turcios CMA([facility])..................8/19/2021   10:04 AM

## 2021-08-19 NOTE — TELEPHONE ENCOUNTER
This was addressed in another message.  Negin Turcios CMA([facility])..................8/19/2021   10:07 AM

## 2021-08-23 ENCOUNTER — ANTICOAGULATION THERAPY VISIT (OUTPATIENT)
Dept: ANTICOAGULATION | Facility: OTHER | Age: 66
End: 2021-08-23
Attending: INTERNAL MEDICINE
Payer: COMMERCIAL

## 2021-08-23 DIAGNOSIS — I73.9 PAD (PERIPHERAL ARTERY DISEASE) (H): Primary | ICD-10-CM

## 2021-08-23 DIAGNOSIS — Z86.74 H/O CARDIAC ARREST: ICD-10-CM

## 2021-08-23 DIAGNOSIS — I25.2 HISTORY OF ST ELEVATION MYOCARDIAL INFARCTION (STEMI): ICD-10-CM

## 2021-08-23 DIAGNOSIS — Z86.73 HISTORY OF CVA (CEREBROVASCULAR ACCIDENT): ICD-10-CM

## 2021-08-23 DIAGNOSIS — I25.10 CORONARY ARTERY DISEASE INVOLVING NATIVE CORONARY ARTERY OF NATIVE HEART WITHOUT ANGINA PECTORIS: ICD-10-CM

## 2021-08-23 DIAGNOSIS — Z86.2 HISTORY OF ITP: ICD-10-CM

## 2021-08-23 LAB — INR (EXTERNAL): 2.2 (ref 0.9–1.1)

## 2021-08-24 NOTE — PROGRESS NOTES
ANTICOAGULATION FOLLOW-UP CLINIC VISIT    Patient Name:  Miguel Parisi  Date:  2021  Contact Type:  [facility]     SUBJECTIVE:  Patient Findings         Clinical Outcomes     Negatives:  Major bleeding event, Thromboembolic event, Anticoagulation-related hospital admission, Anticoagulation-related ED visit, Anticoagulation-related fatality           OBJECTIVE    Recent labs: (last 7 days)     21  1600   INR 2.2*       ASSESSMENT / PLAN  INR assessment THER    Recheck INR In: 4 DAYS    [facility]R Location [facility]      Anticoagulation Summary  As of 2021    INR goal:  2.0-3.0   TTR:  54.2 % (3 d)   INR used for dosin.2 (2021)   Warfarin maintenance plan:  6 mg (2 mg x 3) every Mon; 4 mg (2 mg x 2) all other days   Full warfarin instructions:  6 mg every Mon; 4 mg all other days   Weekly warfarin total:  30 mg   Plan last modified:  Gissel Arechiga RN (2021)   Next INR check:  2021   Priority:  Critical   Target end date:  8/10/2027    Indications    PAD (peripheral artery disease) (H) [I73.9]  History of CVA on 2020 [Z86.73]  History of ITP on 2021 [Z86.2]  H/O STEMI on 4/15/2021 [I25.2]  H/O cardiac arrest [Z86.74]  Coronary artery disease involving native coronary artery of native heart without angina pectoris [I25.10]             Anticoagulation Episode Summary     INR check location:      Preferred lab:      Send INR reminders to:  [facility]    Comments:        Anticoagulation Care Providers     Provider Role Specialty Phone number    Danii Ortiz NP Referring Internal Medicine [phone]            See the Encounter Report to view Anticoagulation Flowsheet and Dosing Calendar (Go to Encounters tab in chart review, and find the Anticoagulation Therapy Visit)        Ilsa Hawkins, SARA

## 2021-08-27 ENCOUNTER — ANTICOAGULATION THERAPY VISIT (OUTPATIENT)
Dept: ANTICOAGULATION | Facility: OTHER | Age: 66
End: 2021-08-27
Attending: INTERNAL MEDICINE
Payer: COMMERCIAL

## 2021-08-27 DIAGNOSIS — Z86.74 H/O CARDIAC ARREST: ICD-10-CM

## 2021-08-27 DIAGNOSIS — I25.10 CORONARY ARTERY DISEASE INVOLVING NATIVE CORONARY ARTERY OF NATIVE HEART WITHOUT ANGINA PECTORIS: ICD-10-CM

## 2021-08-27 DIAGNOSIS — I25.2 HISTORY OF ST ELEVATION MYOCARDIAL INFARCTION (STEMI): ICD-10-CM

## 2021-08-27 DIAGNOSIS — I73.9 PAD (PERIPHERAL ARTERY DISEASE) (H): Primary | ICD-10-CM

## 2021-08-27 DIAGNOSIS — Z86.73 HISTORY OF CVA (CEREBROVASCULAR ACCIDENT): ICD-10-CM

## 2021-08-27 DIAGNOSIS — Z86.2 HISTORY OF ITP: ICD-10-CM

## 2021-08-27 LAB — INR (EXTERNAL): 2.9 (ref 0.9–1.1)

## 2021-08-27 NOTE — PROGRESS NOTES
ANTICOAGULATION FOLLOW-UP CLINIC VISIT    Patient Name:  Miguel Parisi  Date:  2021  Contact Type:  Telephone/ spoke with Nanette mccarthy with [facility]    SUBJECTIVE:  Patient Findings         Clinical Outcomes     Negatives:  Major bleeding event, Thromboembolic event, Anticoagulation-related hospital admission, Anticoagulation-related ED visit, Anticoagulation-related fatality           OBJECTIVE    Recent labs: (last 7 days)     21  1301   INR 2.9*       ASSESSMENT / PLAN  INR assessment THER    Recheck INR In: 4 DAYS    [facility]      Anticoagulation Summary  As of 2021    INR goal:  2.0-3.0   TTR:  77.7 % (1 wk)   INR used for dosin.9 (2021)   Warfarin maintenance plan:  6 mg (2 mg x 3) every Mon; 4 mg (2 mg x 2) all other days   Full warfarin instructions:  6 mg every Mon; 4 mg all other days   Weekly warfarin total:  30 mg   No change documented:  Gissel Arechiga RN   Plan last modified:  Gissel Arechiga RN (2021)   Next INR check:  2021   Priority:  Critical   Target end date:  8/10/2027    Indications    PAD (peripheral artery disease) (H) [I73.9]  History of CVA on 2020 [Z86.73]  History of ITP on 2021 [Z86.2]  H/O STEMI on 4/15/2021 [I25.2]  H/O cardiac arrest [Z86.74]  Coronary artery disease involving native coronary artery of native heart without angina pectoris [I25.10]             Anticoagulation Episode Summary     INR check location:      Preferred lab:      Send INR reminders to:  [facility]    Comments:        Anticoagulation Care Providers     Provider Role Specialty Phone number    Danii Ortiz NP Referring Internal Medicine [phone]            See the Encounter Report to view Anticoagulation Flowsheet and Dosing Calendar (Go to Encounters tab in chart review, and find the Anticoagulation Therapy Visit)        Gissel Arechiga RN

## 2021-08-30 ENCOUNTER — ANTICOAGULATION THERAPY VISIT (OUTPATIENT)
Dept: ANTICOAGULATION | Facility: OTHER | Age: 66
End: 2021-08-30
Attending: INTERNAL MEDICINE
Payer: COMMERCIAL

## 2021-08-30 DIAGNOSIS — Z86.2 HISTORY OF ITP: ICD-10-CM

## 2021-08-30 DIAGNOSIS — Z86.73 HISTORY OF CVA (CEREBROVASCULAR ACCIDENT): ICD-10-CM

## 2021-08-30 DIAGNOSIS — Z86.74 H/O CARDIAC ARREST: ICD-10-CM

## 2021-08-30 DIAGNOSIS — I25.2 HISTORY OF ST ELEVATION MYOCARDIAL INFARCTION (STEMI): ICD-10-CM

## 2021-08-30 DIAGNOSIS — I73.9 PAD (PERIPHERAL ARTERY DISEASE) (H): Primary | ICD-10-CM

## 2021-08-30 DIAGNOSIS — I25.10 CORONARY ARTERY DISEASE INVOLVING NATIVE CORONARY ARTERY OF NATIVE HEART WITHOUT ANGINA PECTORIS: ICD-10-CM

## 2021-08-30 LAB — INR (EXTERNAL): 2.5 (ref 0.9–1.1)

## 2021-08-30 NOTE — PROGRESS NOTES
ANTICOAGULATION FOLLOW-UP CLINIC VISIT    Patient Name:  Miguel Parisi  Date:  2021  Contact Type:  Telephone/ spoke with Nanette mccarthy with [facility]    SUBJECTIVE:  Patient Findings         Clinical Outcomes     Negatives:  Major bleeding event, Thromboembolic event, Anticoagulation-related hospital admission, Anticoagulation-related ED visit, Anticoagulation-related fatality           OBJECTIVE    Recent labs: (last 7 days)     21  1406   INR 2.5*       ASSESSMENT / PLAN  INR assessment THER    Recheck INR In: 1 WEEK    [facility] Location [facility]      Anticoagulation Summary  As of 2021    INR goal:  2.0-3.0   TTR:  84.1 % (1.4 wk)   INR used for dosin.5 (2021)   Warfarin maintenance plan:  6 mg (2 mg x 3) every Mon; 4 mg (2 mg x 2) all other days   Full warfarin instructions:  6 mg every Mon; 4 mg all other days   Weekly warfarin total:  30 mg   No change documented:  Gissel Arechiga RN   Plan last modified:  Gissel Arechiga RN (2021)   Next INR check:  2021   Priority:  Critical   Target end date:  8/10/2027    Indications    PAD (peripheral artery disease) (H) [I73.9]  History of CVA on 2020 [Z86.73]  History of ITP on 2021 [Z86.2]  H/O STEMI on 4/15/2021 [I25.2]  H/O cardiac arrest [Z86.74]  Coronary artery disease involving native coronary artery of native heart without angina pectoris [I25.10]             Anticoagulation Episode Summary     INR check location:      Preferred lab:      Send INR reminders to:  [facility]    Comments:        Anticoagulation Care Providers     Provider Role Specialty Phone number    Danii Ortiz NP Referring Internal Medicine [phone]            See the Encounter Report to view Anticoagulation Flowsheet and Dosing Calendar (Go to Encounters tab in chart review, and find the Anticoagulation Therapy Visit)        Gissel Arechiga RN

## 2021-09-07 ENCOUNTER — ANTICOAGULATION THERAPY VISIT (OUTPATIENT)
Dept: ANTICOAGULATION | Facility: OTHER | Age: 66
End: 2021-09-07
Attending: INTERNAL MEDICINE
Payer: COMMERCIAL

## 2021-09-07 DIAGNOSIS — Z86.73 HISTORY OF CVA (CEREBROVASCULAR ACCIDENT): ICD-10-CM

## 2021-09-07 DIAGNOSIS — I73.9 PAD (PERIPHERAL ARTERY DISEASE) (H): Primary | ICD-10-CM

## 2021-09-07 DIAGNOSIS — I25.10 CORONARY ARTERY DISEASE INVOLVING NATIVE CORONARY ARTERY OF NATIVE HEART WITHOUT ANGINA PECTORIS: ICD-10-CM

## 2021-09-07 DIAGNOSIS — Z86.74 H/O CARDIAC ARREST: ICD-10-CM

## 2021-09-07 DIAGNOSIS — I25.2 HISTORY OF ST ELEVATION MYOCARDIAL INFARCTION (STEMI): ICD-10-CM

## 2021-09-07 DIAGNOSIS — Z86.2 HISTORY OF ITP: ICD-10-CM

## 2021-09-07 LAB — INR (EXTERNAL): 3.1 (ref 0.9–1.1)

## 2021-09-07 NOTE — PROGRESS NOTES
ANTICOAGULATION FOLLOW-UP CLINIC VISIT    Patient Name:  Miguel Parisi  Date:  9/7/2021  Contact Type:  Telephone/ spoke with Nanette mccarthy with [facility]    SUBJECTIVE:  Patient Findings         Clinical Outcomes     Negatives:  Major bleeding event, Thromboembolic event, Anticoagulation-related hospital admission, Anticoagulation-related ED visit, Anticoagulation-related fatality           OBJECTIVE    Recent labs: (last 7 days)     09/07/21  1517   INR 3.1*       ASSESSMENT / PLAN  INR assessment SUPRA    Recheck INR In: 1 WEEK    [facility] Location [facility]      Anticoagulation Summary  As of 9/7/2021    INR goal:  2.0-3.0   TTR:  83.8 % (2.6 wk)   INR used for dosing:  3.1 (9/7/2021)   Warfarin maintenance plan:  4 mg (2 mg x 2) every day   Full warfarin instructions:  9/7: 2 mg; Otherwise 4 mg every day   Weekly warfarin total:  28 mg   Plan last modified:  Gissel Arechiga RN (9/7/2021)   Next INR check:  9/14/2021   Priority:  High   Target end date:  8/10/2027    Indications    PAD (peripheral artery disease) (H) [I73.9]  History of CVA on 11/16/2020 [Z86.73]  History of ITP on 4/16/2021 [Z86.2]  H/O STEMI on 4/15/2021 [I25.2]  H/O cardiac arrest [Z86.74]  Coronary artery disease involving native coronary artery of native heart without angina pectoris [I25.10]             Anticoagulation Episode Summary     INR check location:      Preferred lab:      Send INR reminders to:  [facility]    Comments:        Anticoagulation Care Providers     Provider Role Specialty Phone number    Danii Ortiz NP Referring Internal Medicine [phone]            See the Encounter Report to view Anticoagulation Flowsheet and Dosing Calendar (Go to Encounters tab in chart review, and find the Anticoagulation Therapy Visit)        Gissel Arechiga, RN

## 2021-09-13 ENCOUNTER — ANTICOAGULATION THERAPY VISIT (OUTPATIENT)
Dept: ANTICOAGULATION | Facility: OTHER | Age: 66
End: 2021-09-13
Attending: INTERNAL MEDICINE
Payer: COMMERCIAL

## 2021-09-13 DIAGNOSIS — I25.10 CORONARY ARTERY DISEASE INVOLVING NATIVE CORONARY ARTERY OF NATIVE HEART WITHOUT ANGINA PECTORIS: ICD-10-CM

## 2021-09-13 DIAGNOSIS — I73.9 PAD (PERIPHERAL ARTERY DISEASE) (H): Primary | ICD-10-CM

## 2021-09-13 DIAGNOSIS — Z86.73 HISTORY OF CVA (CEREBROVASCULAR ACCIDENT): ICD-10-CM

## 2021-09-13 DIAGNOSIS — Z86.2 HISTORY OF ITP: ICD-10-CM

## 2021-09-13 DIAGNOSIS — I25.2 HISTORY OF ST ELEVATION MYOCARDIAL INFARCTION (STEMI): ICD-10-CM

## 2021-09-13 DIAGNOSIS — Z86.74 H/O CARDIAC ARREST: ICD-10-CM

## 2021-09-13 LAB — INR (EXTERNAL): 1.6 (ref 0.9–1.1)

## 2021-09-13 NOTE — PROGRESS NOTES
ANTICOAGULATION FOLLOW-UP CLINIC VISIT    Patient Name:  Miguel Parisi  Date:  2021  Contact Type:  Telephone/ spoke with Nanette mccarthy with [facility]    SUBJECTIVE:  Patient Findings         Clinical Outcomes     Negatives:  Major bleeding event, Thromboembolic event, Anticoagulation-related hospital admission, Anticoagulation-related ED visit, Anticoagulation-related fatality           OBJECTIVE    Recent labs: (last 7 days)     21  1415   INR 1.6*       ASSESSMENT / PLAN  INR assessment SUB    Recheck INR In: 1 WEEK    [facility] Location [facility]      Anticoagulation Summary  As of 2021    INR goal:  2.0-3.0   TTR:  79.6 % (3.4 wk)   INR used for dosin.6 (2021)   Warfarin maintenance plan:  6 mg (2 mg x 3) every Mon; 4 mg (2 mg x 2) all other days   Full warfarin instructions:  6 mg every Mon; 4 mg all other days   Weekly warfarin total:  30 mg   Plan last modified:  Gissel Arechiga RN (2021)   Next INR check:  2021   Priority:  High   Target end date:  8/10/2027    Indications    PAD (peripheral artery disease) (H) [I73.9]  History of CVA on 2020 [Z86.73]  History of ITP on 2021 [Z86.2]  H/O STEMI on 4/15/2021 [I25.2]  H/O cardiac arrest [Z86.74]  Coronary artery disease involving native coronary artery of native heart without angina pectoris [I25.10]             Anticoagulation Episode Summary     INR check location:      Preferred lab:      Send INR reminders to:  [facility]    Comments:        Anticoagulation Care Providers     Provider Role Specialty Phone number    Danii Ortiz NP Referring Internal Medicine [phone]            See the Encounter Report to view Anticoagulation Flowsheet and Dosing Calendar (Go to Encounters tab in chart review, and find the Anticoagulation Therapy Visit)        Gissel Arechiga, RN

## 2021-09-14 ENCOUNTER — OFFICE VISIT (OUTPATIENT)
Dept: INTERNAL MEDICINE | Facility: OTHER | Age: 66
End: 2021-09-14
Attending: NURSE PRACTITIONER
Payer: COMMERCIAL

## 2021-09-14 VITALS
DIASTOLIC BLOOD PRESSURE: 78 MMHG | HEART RATE: 87 BPM | WEIGHT: 211.4 LBS | RESPIRATION RATE: 16 BRPM | OXYGEN SATURATION: 96 % | TEMPERATURE: 97.8 F | BODY MASS INDEX: 29.5 KG/M2 | SYSTOLIC BLOOD PRESSURE: 136 MMHG

## 2021-09-14 DIAGNOSIS — I69.354 HEMIPLEGIA AND HEMIPARESIS FOLLOWING CEREBRAL INFARCTION AFFECTING LEFT NON-DOMINANT SIDE (H): ICD-10-CM

## 2021-09-14 DIAGNOSIS — I10 BENIGN ESSENTIAL HYPERTENSION: ICD-10-CM

## 2021-09-14 DIAGNOSIS — Z99.2 ESRD (END STAGE RENAL DISEASE) ON DIALYSIS (H): ICD-10-CM

## 2021-09-14 DIAGNOSIS — N18.6 ESRD (END STAGE RENAL DISEASE) ON DIALYSIS (H): ICD-10-CM

## 2021-09-14 DIAGNOSIS — R53.1 LEFT-SIDED WEAKNESS: ICD-10-CM

## 2021-09-14 DIAGNOSIS — I48.91 ON COUMADIN FOR ATRIAL FIBRILLATION (H): ICD-10-CM

## 2021-09-14 DIAGNOSIS — E11.65 TYPE 2 DIABETES MELLITUS WITH HYPERGLYCEMIA, WITHOUT LONG-TERM CURRENT USE OF INSULIN (H): Primary | ICD-10-CM

## 2021-09-14 DIAGNOSIS — Z79.01 ON COUMADIN FOR ATRIAL FIBRILLATION (H): ICD-10-CM

## 2021-09-14 PROBLEM — I25.10 ATHEROSCLEROTIC CARDIOVASCULAR DISEASE: Status: ACTIVE | Noted: 2021-04-17

## 2021-09-14 PROBLEM — I69.393 ATAXIA DUE TO OLD CEREBELLAR INFARCTION: Status: ACTIVE | Noted: 2021-06-17

## 2021-09-14 PROBLEM — I65.23 BILATERAL CAROTID ARTERY STENOSIS: Status: ACTIVE | Noted: 2020-11-16

## 2021-09-14 PROBLEM — Z86.74 HISTORY OF CARDIAC ARREST: Status: ACTIVE | Noted: 2021-05-10

## 2021-09-14 PROBLEM — I69.398 ABNORMALITY OF GAIT AS LATE EFFECT OF CEREBROVASCULAR ACCIDENT (CVA): Status: ACTIVE | Noted: 2020-12-18

## 2021-09-14 PROBLEM — D69.6 ACQUIRED THROMBOCYTOPENIA (H): Status: ACTIVE | Noted: 2021-07-25

## 2021-09-14 PROBLEM — I25.5 GENERALIZED ISCHEMIC MYOCARDIAL DYSFUNCTION: Status: ACTIVE | Noted: 2021-07-25

## 2021-09-14 PROBLEM — T81.31XD DEHISCENCE OF INCISION, SUBSEQUENT ENCOUNTER: Status: ACTIVE | Noted: 2021-06-17

## 2021-09-14 PROBLEM — N18.4 STAGE 4 CHRONIC KIDNEY DISEASE (H): Status: ACTIVE | Noted: 2021-07-23

## 2021-09-14 PROBLEM — E83.39 HYPERPHOSPHATEMIA: Status: ACTIVE | Noted: 2021-07-27

## 2021-09-14 PROBLEM — I25.10 ATHEROSCLEROTIC HEART DISEASE OF NATIVE CORONARY ARTERY WITHOUT ANGINA PECTORIS: Status: ACTIVE | Noted: 2021-04-17

## 2021-09-14 PROBLEM — E66.3 OVERWEIGHT: Status: ACTIVE | Noted: 2021-06-17

## 2021-09-14 PROBLEM — I73.9 INTERMITTENT CLAUDICATION (H): Status: ACTIVE | Noted: 2019-05-09

## 2021-09-14 PROBLEM — I69.398 IMPAIRED BALANCE AS LATE EFFECT OF CEREBROVASCULAR ACCIDENT (CVA): Status: ACTIVE | Noted: 2020-12-18

## 2021-09-14 PROBLEM — R29.898 ANKLE WEAKNESS: Status: ACTIVE | Noted: 2020-12-18

## 2021-09-14 PROBLEM — Z95.1 HISTORY OF CORONARY ARTERY BYPASS SURGERY: Status: ACTIVE | Noted: 2021-07-23

## 2021-09-14 PROBLEM — R26.89 IMPAIRED BALANCE AS LATE EFFECT OF CEREBROVASCULAR ACCIDENT (CVA): Status: ACTIVE | Noted: 2020-12-18

## 2021-09-14 PROBLEM — I21.9 MYOCARDIAL INFARCTION (H): Status: ACTIVE | Noted: 2021-04-16

## 2021-09-14 PROBLEM — D62 ANEMIA DUE TO ACUTE BLOOD LOSS: Status: ACTIVE | Noted: 2021-07-25

## 2021-09-14 PROBLEM — R26.9 ABNORMALITY OF GAIT AS LATE EFFECT OF CEREBROVASCULAR ACCIDENT (CVA): Status: ACTIVE | Noted: 2020-12-18

## 2021-09-14 PROBLEM — D68.9: Status: ACTIVE | Noted: 2020-11-19

## 2021-09-14 PROBLEM — Z86.74 HISTORY OF SUDDEN CARDIAC ARREST SUCCESSFULLY RESUSCITATED: Status: ACTIVE | Noted: 2021-04-17

## 2021-09-14 PROBLEM — I73.9 PAD (PERIPHERAL ARTERY DISEASE) (H): Status: ACTIVE | Noted: 2019-05-09

## 2021-09-14 PROBLEM — D72.829 LEUKOCYTOSIS: Status: ACTIVE | Noted: 2021-07-25

## 2021-09-14 PROBLEM — E11.9 TYPE 2 DIABETES MELLITUS (H): Status: ACTIVE | Noted: 2019-01-28

## 2021-09-14 LAB
ALBUMIN SERPL-MCNC: 4 G/DL (ref 3.5–5.7)
ALP SERPL-CCNC: 68 U/L (ref 34–104)
ALT SERPL W P-5'-P-CCNC: 10 U/L (ref 7–52)
ANION GAP SERPL CALCULATED.3IONS-SCNC: 9 MMOL/L (ref 3–14)
AST SERPL W P-5'-P-CCNC: 15 U/L (ref 13–39)
BASOPHILS # BLD AUTO: 0.1 10E3/UL (ref 0–0.2)
BASOPHILS NFR BLD AUTO: 1 %
BILIRUB SERPL-MCNC: 0.6 MG/DL (ref 0.3–1)
BUN SERPL-MCNC: 26 MG/DL (ref 7–25)
CALCIUM SERPL-MCNC: 9.3 MG/DL (ref 8.6–10.3)
CHLORIDE BLD-SCNC: 98 MMOL/L (ref 98–107)
CO2 SERPL-SCNC: 34 MMOL/L (ref 21–31)
CREAT SERPL-MCNC: 3.45 MG/DL (ref 0.7–1.3)
EOSINOPHIL # BLD AUTO: 0.2 10E3/UL (ref 0–0.7)
EOSINOPHIL NFR BLD AUTO: 3 %
ERYTHROCYTE [DISTWIDTH] IN BLOOD BY AUTOMATED COUNT: 16.8 % (ref 10–15)
GFR SERPL CREATININE-BSD FRML MDRD: 17 ML/MIN/1.73M2
GLUCOSE BLD-MCNC: 195 MG/DL (ref 70–105)
HCT VFR BLD AUTO: 39.7 % (ref 40–53)
HGB BLD-MCNC: 12.7 G/DL (ref 13.3–17.7)
IMM GRANULOCYTES # BLD: 0 10E3/UL
IMM GRANULOCYTES NFR BLD: 1 %
LYMPHOCYTES # BLD AUTO: 1.5 10E3/UL (ref 0.8–5.3)
LYMPHOCYTES NFR BLD AUTO: 18 %
MCH RBC QN AUTO: 30.2 PG (ref 26.5–33)
MCHC RBC AUTO-ENTMCNC: 32 G/DL (ref 31.5–36.5)
MCV RBC AUTO: 94 FL (ref 78–100)
MONOCYTES # BLD AUTO: 0.6 10E3/UL (ref 0–1.3)
MONOCYTES NFR BLD AUTO: 7 %
NEUTROPHILS # BLD AUTO: 6.1 10E3/UL (ref 1.6–8.3)
NEUTROPHILS NFR BLD AUTO: 70 %
NRBC # BLD AUTO: 0 10E3/UL
NRBC BLD AUTO-RTO: 0 /100
PLATELET # BLD AUTO: 148 10E3/UL (ref 150–450)
POTASSIUM BLD-SCNC: 4.2 MMOL/L (ref 3.5–5.1)
PROT SERPL-MCNC: 6.9 G/DL (ref 6.4–8.9)
RBC # BLD AUTO: 4.21 10E6/UL (ref 4.4–5.9)
SODIUM SERPL-SCNC: 141 MMOL/L (ref 134–144)
WBC # BLD AUTO: 8.5 10E3/UL (ref 4–11)

## 2021-09-14 PROCEDURE — 82040 ASSAY OF SERUM ALBUMIN: CPT | Mod: ZL | Performed by: NURSE PRACTITIONER

## 2021-09-14 PROCEDURE — 36415 COLL VENOUS BLD VENIPUNCTURE: CPT | Mod: ZL | Performed by: NURSE PRACTITIONER

## 2021-09-14 PROCEDURE — 99214 OFFICE O/P EST MOD 30 MIN: CPT | Performed by: NURSE PRACTITIONER

## 2021-09-14 PROCEDURE — G0463 HOSPITAL OUTPT CLINIC VISIT: HCPCS

## 2021-09-14 PROCEDURE — 85004 AUTOMATED DIFF WBC COUNT: CPT | Mod: ZL | Performed by: NURSE PRACTITIONER

## 2021-09-14 RX ORDER — HYDROCHLOROTHIAZIDE 12.5 MG/1
CAPSULE ORAL
COMMUNITY
Start: 2021-01-04 | End: 2021-10-05 | Stop reason: ALTCHOICE

## 2021-09-14 ASSESSMENT — PAIN SCALES - GENERAL: PAINLEVEL: NO PAIN (0)

## 2021-09-14 ASSESSMENT — ENCOUNTER SYMPTOMS
WOUND: 1
WEAKNESS: 1

## 2021-09-14 NOTE — LETTER
September 15, 2021      Miguel Parisi  [id number] [facility] [address]        Dear ,    We are writing to inform you of your test results.    Resulted Orders   Comprehensive metabolic panel   Result Value Ref Range    Sodium 141 134 - 144 mmol/L    Potassium 4.2 3.5 - 5.1 mmol/L    Chloride 98 98 - 107 mmol/L    Carbon Dioxide (CO2) 34 (H) 21 - 31 mmol/L    Anion Gap 9 3 - 14 mmol/L    Urea Nitrogen 26 (H) 7 - 25 mg/dL    Creatinine 3.45 (H) 0.70 - 1.30 mg/dL    Calcium 9.3 8.6 - 10.3 mg/dL    Glucose 195 (H) 70 - 105 mg/dL    Alkaline Phosphatase 68 34 - 104 U/L    AST 15 13 - 39 U/L    ALT 10 7 - 52 U/L    Protein Total 6.9 6.4 - 8.9 g/dL    Albumin 4.0 3.5 - 5.7 g/dL    Bilirubin Total 0.6 0.3 - 1.0 mg/dL    GFR Estimate 17 (L) >60 mL/min/1.73m2      Comment:      As of July 11, 2021, eGFR is calculated by the CKD-EPI creatinine equation, without race adjustment. eGFR can be influenced by muscle mass, exercise, and diet. The reported eGFR is an estimation only and is only applicable if the renal function is stable.   CBC with platelets and differential   Result Value Ref Range    WBC Count 8.5 4.0 - 11.0 10e3/uL    RBC Count 4.21 (L) 4.40 - 5.90 10e6/uL    Hemoglobin 12.7 (L) 13.3 - 17.7 g/dL    Hematocrit 39.7 (L) 40.0 - 53.0 %    MCV 94 78 - 100 fL    MCH 30.2 26.5 - 33.0 pg    MCHC 32.0 31.5 - 36.5 g/dL    RDW 16.8 (H) 10.0 - 15.0 %    Platelet Count 148 (L) 150 - 450 10e3/uL    % Neutrophils 70 %    % Lymphocytes 18 %    % Monocytes 7 %    % Eosinophils 3 %    % Basophils 1 %    % Immature Granulocytes 1 %    NRBCs per 100 WBC 0 <1 /100    Absolute Neutrophils 6.1 1.6 - 8.3 10e3/uL    Absolute Lymphocytes 1.5 0.8 - 5.3 10e3/uL    Absolute Monocytes 0.6 0.0 - 1.3 10e3/uL    Absolute Eosinophils 0.2 0.0 - 0.7 10e3/uL    Absolute Basophils 0.1 0.0 - 0.2 10e3/uL    Absolute Immature Granulocytes 0.0 <=0.0 10e3/uL    Absolute NRBCs 0.0 10e3/uL       If you have any questions or concerns, please call  the clinic at the number listed above.       Sincerely,      Danii Ortiz NP

## 2021-09-14 NOTE — NURSING NOTE
"Chief Complaint   Patient presents with     Diabetes     6 week check     Previous A1C is at goal of <8  Lab Results   Component Value Date    A1C 5.6 08/10/2021    A1C 6.1 06/28/2021     Urine microalbumin:creatine: 8/10/21  Foot exam 8/10/21  Eye exam 2020 - Due    Tobacco User No  Patient is on a daily aspirin  Patient is on a Statin.  Blood pressure today of:     BP Readings from Last 1 Encounters:   09/14/21 136/78      is at the goal of <139/89 for diabetics.    Pamela Dutton LPN on 9/14/2021 at 1:35 PM        Initial /78 (BP Location: Right arm, Patient Position: Sitting, Cuff Size: Adult Regular)   Pulse 87   Temp 97.8  F (36.6  C) (Tympanic)   Resp 16   Wt 95.9 kg (211 lb 6.4 oz)   SpO2 96%   BMI 29.50 kg/m   Estimated body mass index is 29.5 kg/m  as calculated from the following:    Height as of 8/17/21: 1.803 m (5' 10.98\").    Weight as of this encounter: 95.9 kg (211 lb 6.4 oz).  Medication Reconciliation: complete  FOOD SECURITY SCREENING QUESTIONS  Hunger Vital Signs:  Within the past 12 months we worried whether our food would run out before we got money to buy more. Never  Within the past 12 months the food we bought just didn't last and we didn't have money to get more. Never    Advance care plan reviewed      Pamela Dutton LPN    "

## 2021-09-14 NOTE — PROGRESS NOTES
"  Miguel Parisi  : 1955 Age: 65 year old Sex: male MRN: [id number]    CC:   Chief Complaint   Patient presents with     Diabetes     6 week check     NURSE'S NOTES:    Nursing Notes:   Pamela Dutton LPN  2021  1:47 PM  Signed  Chief Complaint   Patient presents with     Diabetes     6 week check     Previous A1C is at goal of <8  Lab Results   Component Value Date    A1C 5.6 08/10/2021    A1C 6.1 2021     Urine microalbumin:creatine: 8/10/21  Foot exam 8/10/21  Eye exam  - Due    Tobacco User No  Patient is on a daily aspirin  Patient is on a Statin.  Blood pressure today of:     BP Readings from Last 1 Encounters:   21 136/78      is at the goal of <139/89 for diabetics.    Pamela Dutton LPN on 2021 at 1:35 PM        Initial /78 (BP Location: Right arm, Patient Position: Sitting, Cuff Size: Adult Regular)   Pulse 87   Temp 97.8  F (36.6  C) (Tympanic)   Resp 16   Wt 95.9 kg (211 lb 6.4 oz)   SpO2 96%   BMI 29.50 kg/m   Estimated body mass index is 29.5 kg/m  as calculated from the following:    Height as of 21: 1.803 m (5' 10.98\").    Weight as of this encounter: 95.9 kg (211 lb 6.4 oz).  Medication Reconciliation: complete  FOOD SECURITY SCREENING QUESTIONS  Hunger Vital Signs:  Within the past 12 months we worried whether our food would run out before we got money to buy more. Never  Within the past 12 months the food we bought just didn't last and we didn't have money to get more. Never    Advance care plan reviewed      Pamela Dutton LPN         Nursing note reviewed with patient.  Accuracy and completeness verified.      SUBJECTIVE:                                                      HPI:     Patient presents to the clinic for diabetic follow-up.     Patient has Type 2 Diabetes    Diabetes medications: None    Patient is on a daily aspirin.  Patient is on a Statin.  Blood glucose tests per day None  Most recent urine microalbumin:  Was ordered in " June, never completed. Will obtain today.  Most recent eye exam: Is overdue, will make appointment      Blood pressure today of 136/78 is at the goal of <139/89 for diabetics.    Patient overall has been doing well.  He is feeling good and sleeping well.     He continues on Plavix.  His Plavix will be in place for 1 year postop - he is 7 weeks out.  In addition, he is on warfarin for atrial fibrillation.  It was recommended that possibly starting an ACE inhibitor would be reasonable.  He does not want to be set up with cardiac rehab, feels he is active enough at home.     He continues on home dialysis on Tuesday, Thursday and Saturdays.  He has not had any issues with this.  He reports today that he is seeing a nephrologist now.    Tobacco User:   History   Smoking Status     Former Smoker     Packs/day: 1.00     Types: Cigarettes     Quit date: 1/1/1990   Smokeless Tobacco     Never Used     Medication Reconciliation: complete     PHQ and/or RANJAN reviewed. Pt referred to PCP/MH Provider as appropriate.    Miguel Parisi also presents with:    Patient Active Problem List   Diagnosis     H/O cardiac arrest     Coronary artery disease involving native coronary artery of native heart without angina pectoris     History of stroke     H/O STEMI on 4/15/2021     Benign essential hypertension     Morbid obesity (H)     Type 2 diabetes mellitus with hyperglycemia, without long-term current use of insulin (H)     Hypertriglyceridemia     History of CVA on 11/16/2020     Chronic combined systolic and diastolic congestive heart failure (H)     Clinical diagnosis of COVID-19 on 11/9/2020     ESRD (end stage renal disease) on dialysis (H)     KEISHA (obstructive sleep apnea)     Mild pulmonary hypertension (H)     PAD (peripheral artery disease) (H)     Bilateral carotid artery stenosis     History of ITP on 4/16/2021     Ulcer of left foot(H)     Left-sided weakness     Chronic obstructive pulmonary disease, unspecified COPD  type (H)     History of coronary artery bypass graft x 1 at [place] on 7/17/21     Coronary artery disease involving coronary bypass graft of native heart without angina pectoris     H/O NSTEMI on 7/16/2021     History of tobacco abuse     Tobacco abuse counseling     On Coumadin for atrial fibrillation (H)     Paroxysmal atrial fibrillation (H)     Current Code Status:  No Order    REVIEW OF SYSTEMS:    Review of Systems   Eyes:        Due for eye appointment   Cardiovascular: Positive for leg swelling.        Dialysis shunt in left chest; patient wondering about changing to the arm ports.   Genitourinary:        Remains on home dialysis three times a week   Musculoskeletal: Positive for gait problem.   Skin: Positive for wound.   Neurological: Positive for weakness (using walking).   All other systems reviewed and are negative.    Problem List/PMH: Reviewed in EMR, and made relevant updates today.  Medications: Reviewed in EMR, and made relevant updates today.  Allergies: Reviewed in EMR, and made relevant updates today.    OBJECTIVE:                                                      /78 (BP Location: Right arm, Patient Position: Sitting, Cuff Size: Adult Regular)   Pulse 87   Temp 97.8  F (36.6  C) (Tympanic)   Resp 16   Wt 95.9 kg (211 lb 6.4 oz)   SpO2 96%   BMI 29.50 kg/m      Current Pain Score:   No Pain (0)     Physical Exam  Vitals reviewed.   Constitutional:       Appearance: Normal appearance.   HENT:      Head: Normocephalic and atraumatic.   Eyes:      Extraocular Movements: Extraocular movements intact.      Conjunctiva/sclera: Conjunctivae normal.   Cardiovascular:      Rate and Rhythm: Normal rate and regular rhythm.      Heart sounds: Normal heart sounds.   Pulmonary:      Effort: Pulmonary effort is normal.      Breath sounds: Normal breath sounds.   Abdominal:      General: Bowel sounds are normal.      Palpations: Abdomen is soft.   Musculoskeletal:         General: Normal range of  motion.      Right lower leg: Edema present.      Left lower leg: Edema present.   Skin:     General: Skin is warm and dry.   Neurological:      Mental Status: He is alert and oriented to person, place, and time. Mental status is at baseline.      Motor: Weakness present.   Psychiatric:         Mood and Affect: Mood normal.         Behavior: Behavior normal.         Thought Content: Thought content normal.         Judgment: Judgment normal.       Per Discharge Note:  ACTIVE ISSUES REQUIRING FOLLOW UP  Follow-up recommendations post Cardiac Surgery  Follow-up Visit: [facility] via a phone visit  Follow-up Testing: Post-operative testing per the discretion of the PCP or Cardiologist. Follow TTE in 3 months to assess EF.     Blood Pressure: American Heart Association Guidelines for individuals with coronary artery disease recommend the following medications: aspirin, a beta-blocker, a statin, and an ACE inhibitor. An ACE inhibitor was not prescribed at dismissal due hypotension. Please initiate if able.     Arrhythmia: No arrhythmias were experienced post-operatively.     Antiplatelet Therapy: Aspirin 81 mg daily recommended to continue life long for coronary artery disease. Plavix 75 mg daily recommended to continue for one year postoperatively for coronary artery bypass graft surgery in the setting of an MI. A 30 day supply was prescribed at dismissal, please provide further refills.     Diuretics: Home dialysis schedule continues on a Tuesday/Thursday/Saturday schedule    Other Medications: No other medication recommendations.    Antibiotics: Post-operative antibiotic regimen completed, no further antibiotic regimen required.    Home Medications: Please see the medication list for continued and discontinued home medications.    Home Healthcare Services: Home Health Care to evaluate and treat.    Wound Care: Please assess surgical incision sites for healing. Contact [facility]  [phone] with any concerns.    Anticoagulation: Warfarin (Coumadin initiated) please see the anticoagulation record below for details.    ANTICOAGULATION RECORD:     INR goal: 2.5 +/- 0.5  Warfarin duration and indication: For history of atrial fibrillation, cardiology to determine duration.   Patient Instructions: Take 1 mg (0.5 tablets) of warfarin (Coumadin) on 7/28. INR to be checked on 7/29 by theAnticoagulation Clinic.      BP Readings from Last 3 Encounters:   09/14/21 136/78   08/17/21 118/60   08/02/21 138/80      Vitals:    09/14/21 1332   Weight: 95.9 kg (211 lb 6.4 oz)      The ASCVD Risk score ([place]lukasz ABBOTT Jr., et al., 2013) failed to calculate for the following reasons:    The patient has a prior MI or stroke diagnosis    Diagnostics Completed at this Visit:    Results for orders placed or performed in visit on 09/14/21   CBC and Differential     Status: Abnormal    Narrative    The following orders were created for panel order CBC and Differential.  Procedure                               Abnormality         Status                     ---------                               -----------         ------                     CBC with platelets and d...[[id number]]  Abnormal            Final result                 Please view results for these tests on the individual orders.   Comprehensive metabolic panel     Status: Abnormal   Result Value Ref Range    Sodium 141 134 - 144 mmol/L    Potassium 4.2 3.5 - 5.1 mmol/L    Chloride 98 98 - 107 mmol/L    Carbon Dioxide (CO2) 34 (H) 21 - 31 mmol/L    Anion Gap 9 3 - 14 mmol/L    Urea Nitrogen 26 (H) 7 - 25 mg/dL    Creatinine 3.45 (H) 0.70 - 1.30 mg/dL    Calcium 9.3 8.6 - 10.3 mg/dL    Glucose 195 (H) 70 - 105 mg/dL    Alkaline Phosphatase 68 34 - 104 U/L    AST 15 13 - 39 U/L    ALT 10 7 - 52 U/L    Protein Total 6.9 6.4 - 8.9 g/dL    Albumin 4.0 3.5 - 5.7 g/dL    Bilirubin Total 0.6 0.3 - 1.0 mg/dL    GFR Estimate 17 (L) >60 mL/min/1.73m2   CBC with platelets  and differential     Status: Abnormal   Result Value Ref Range    WBC Count 8.5 4.0 - 11.0 10e3/uL    RBC Count 4.21 (L) 4.40 - 5.90 10e6/uL    Hemoglobin 12.7 (L) 13.3 - 17.7 g/dL    Hematocrit 39.7 (L) 40.0 - 53.0 %    MCV 94 78 - 100 fL    MCH 30.2 26.5 - 33.0 pg    MCHC 32.0 31.5 - 36.5 g/dL    RDW 16.8 (H) 10.0 - 15.0 %    Platelet Count 148 (L) 150 - 450 10e3/uL    % Neutrophils 70 %    % Lymphocytes 18 %    % Monocytes 7 %    % Eosinophils 3 %    % Basophils 1 %    % Immature Granulocytes 1 %    NRBCs per 100 WBC 0 <1 /100    Absolute Neutrophils 6.1 1.6 - 8.3 10e3/uL    Absolute Lymphocytes 1.5 0.8 - 5.3 10e3/uL    Absolute Monocytes 0.6 0.0 - 1.3 10e3/uL    Absolute Eosinophils 0.2 0.0 - 0.7 10e3/uL    Absolute Basophils 0.1 0.0 - 0.2 10e3/uL    Absolute Immature Granulocytes 0.0 <=0.0 10e3/uL    Absolute NRBCs 0.0 10e3/uL        ASSESSMENT AND PLAN:    Type 2 diabetes mellitus with hyperglycemia, without long-term current use of insulin (H)  Most recent A1c was done by Dr. Wallace in mid August and was in goal range at 5.6.  Will not redraw today., - CBC and Differential, hemoglobin slightly low at 12.7 hematocrit just slightly decreased at 39.7, RDW 16.8 and his platelets are low at 148.  - Comprehensive metabolic panel,  BUN slightly elevated at 26, creatinine is elevated at 3.45 glucose 195 and his GFR is 17 today.  - Previous A1C IS at goal of < 8   - Changes in treatment: no changes to medication  - Weight management plan: The patient was counseled on healthy diet and exercise guidelines.  - Continue on current medications for now.  - Blood glucose logbook was: Not available    ESRD (end stage renal disease) on dialysis (H)  Continue dialysis as instructed  - CBC and Differential  - Comprehensive metabolic panel    On Coumadin for atrial fibrillation (H)  Continue to follow with INR clinic for monitoring and dosing.    Left-sided weakness  Chronic  - CBC and Differential  - Comprehensive metabolic  panel    Hemiplegia and hemiparesis following cerebral infarction affecting left non-dominant side (H)  Chronic    Benign essential hypertension  At goal in clinic today at 136/78.  Well-managed on current medication regimen.  No changes made today     I explained my diagnostic considerations and recommendations to the patient, who voiced understanding and agreement with the treatment plan. All questions were answered. We discussed potential side effects of any prescribed or recommended therapies, as well as expectations for response to treatments.    Patient was advised to allow up to 2 days for response on lab results via MyChart and or letter to be sent.    FOLLOW-UP:    Return in about 3 months (around 12/14/2021) for Diabetic Follow Up.     Clinic : [phone]  Appointment line: [phone]     VIBHA Baker, [facility]  Internal Medicine  09/16/2021 2:21 PM  _____________________________________________________________________________________    Total time spent with this patient was 30 minutes which included chart review, visualization and interpretation of labs and/or images, time spent with patient, and documentation.    PATIENT INSTRUCTIONS:    Patient Instructions   ------------------------ [place], [place] ----------------------------    October 5th, 2021- Tuesday  - 10:30 AM - Hospital Follow-Up ECHO, at [facility].    ------------------------ [place], [place] ----------------------------    October 5th, 2021- Tuesday  - 11:15 AM - Hospital Follow-Up with Nick Wallace, cardiology, at [facility].

## 2021-09-14 NOTE — PATIENT INSTRUCTIONS
------------------------ [place], [place] ----------------------------    October 5th, 2021- Tuesday  - 10:30 AM - Hospital Follow-Up ECHO, at [facility].    ------------------------ [place], [place] ----------------------------    October 5th, 2021- Tuesday  - 11:15 AM - Hospital Follow-Up with Nick Wallace, cardiology, at [facility].

## 2021-09-20 ENCOUNTER — ANTICOAGULATION THERAPY VISIT (OUTPATIENT)
Dept: ANTICOAGULATION | Facility: OTHER | Age: 66
End: 2021-09-20
Attending: INTERNAL MEDICINE
Payer: COMMERCIAL

## 2021-09-20 DIAGNOSIS — I73.9 PAD (PERIPHERAL ARTERY DISEASE) (H): Primary | ICD-10-CM

## 2021-09-20 DIAGNOSIS — I25.2 HISTORY OF ST ELEVATION MYOCARDIAL INFARCTION (STEMI): ICD-10-CM

## 2021-09-20 DIAGNOSIS — Z86.73 HISTORY OF CEREBROVASCULAR ACCIDENT: ICD-10-CM

## 2021-09-20 DIAGNOSIS — I25.10 ATHEROSCLEROTIC HEART DISEASE OF NATIVE CORONARY ARTERY WITHOUT ANGINA PECTORIS: ICD-10-CM

## 2021-09-20 DIAGNOSIS — Z86.2 HISTORY OF ITP: ICD-10-CM

## 2021-09-20 DIAGNOSIS — Z86.74 HISTORY OF CARDIAC ARREST: ICD-10-CM

## 2021-09-20 LAB — INR (EXTERNAL): 2.8 (ref 0.9–1.1)

## 2021-09-20 NOTE — PROGRESS NOTES
ANTICOAGULATION FOLLOW-UP CLINIC VISIT    Patient Name:  Miguel Parisi  Date:  2021  Contact Type:  Telephone/ spoke with Nanette mccarthy with [facility]    SUBJECTIVE:  Patient Findings         Clinical Outcomes     Negatives:  Major bleeding event, Thromboembolic event, Anticoagulation-related hospital admission, Anticoagulation-related ED visit, Anticoagulation-related fatality           OBJECTIVE    Recent labs: (last 7 days)     21  1100   INR 2.8*       ASSESSMENT / PLAN  INR assessment THER    Recheck INR In: 2 WEEKS    INR Location [facility]      Anticoagulation Summary  As of 2021    INR goal:  2.0-3.0   TTR:  76.8 % (1 mo)   INR used for dosin.8 (2021)   Warfarin maintenance plan:  6 mg (2 mg x 3) every Mon; 4 mg (2 mg x 2) all other days   Full warfarin instructions:  6 mg every Mon; 4 mg all other days   Weekly warfarin total:  30 mg   No change documented:  Gissel Arechiga RN   Plan last modified:  Gissel Arechiga RN (2021)   Next INR check:  10/4/2021   Priority:  High   Target end date:  8/10/2027    Indications    PAD (peripheral artery disease) (H) [I73.9]  History of cerebrovascular accident [Z86.73]  History of ITP on 2021 [Z86.2]  H/O STEMI on 4/15/2021 [I25.2]  History of cardiac arrest [Z86.74]  Atherosclerotic heart disease of native coronary artery without angina pectoris [I25.10]             Anticoagulation Episode Summary     INR check location:      Preferred lab:      Send INR reminders to:  [facility]    Comments:        Anticoagulation Care Providers     Provider Role Specialty Phone number    Danii Ortiz NP Referring Internal Medicine [phone]            See the Encounter Report to view Anticoagulation Flowsheet and Dosing Calendar (Go to Encounters tab in chart review, and find the Anticoagulation Therapy Visit)        Gissel Arechiga RN

## 2021-09-21 ENCOUNTER — LAB (OUTPATIENT)
Dept: LAB | Facility: OTHER | Age: 66
End: 2021-09-21
Attending: INTERNAL MEDICINE
Payer: COMMERCIAL

## 2021-09-21 DIAGNOSIS — I25.10 CORONARY ARTERY DISEASE INVOLVING NATIVE CORONARY ARTERY OF NATIVE HEART WITHOUT ANGINA PECTORIS: ICD-10-CM

## 2021-09-21 DIAGNOSIS — E11.65 TYPE 2 DIABETES MELLITUS WITH HYPERGLYCEMIA, WITHOUT LONG-TERM CURRENT USE OF INSULIN (H): ICD-10-CM

## 2021-09-21 DIAGNOSIS — I10 BENIGN ESSENTIAL HYPERTENSION: ICD-10-CM

## 2021-09-21 DIAGNOSIS — I50.42 CHRONIC COMBINED SYSTOLIC AND DIASTOLIC CONGESTIVE HEART FAILURE (H): ICD-10-CM

## 2021-09-21 LAB
CREAT UR-MCNC: 65 MG/DL
MICROALBUMIN UR-MCNC: 1390 MG/L
MICROALBUMIN/CREAT UR: 2138.46 MG/G CR (ref 0–17)

## 2021-09-21 PROCEDURE — 82043 UR ALBUMIN QUANTITATIVE: CPT | Mod: ZL

## 2021-09-21 NOTE — LETTER
October 1, 2021      Miguel Parisi  [address] [place] [place] [address]        Dear ,    We are writing to inform you of your test results.  My nurse should have contacted you by now with the plan to refer you to nephrology for further evaluation and treatment to prevent any further kidney damage.  Really work hard on adequate water intake and controlling your blood sugars.    Resulted Orders   Albumin Random Urine Quantitative with Creat Ratio   Result Value Ref Range    Creatinine Urine mg/dL 65 mg/dL    Albumin Urine mg/L 1,390 mg/L    Albumin Urine mg/g Cr 2,138.46 (H) 0.00 - 17.00 mg/g Cr       If you have any questions or concerns, please call the clinic at the number listed above.       Sincerely,      Danii Ortiz NP

## 2021-09-27 ENCOUNTER — TELEPHONE (OUTPATIENT)
Dept: INTERNAL MEDICINE | Facility: OTHER | Age: 66
End: 2021-09-27

## 2021-09-27 NOTE — TELEPHONE ENCOUNTER
Please call with verbal orders once a week for home care.      Rosangela Patel on 9/27/2021 at 3:02 PM

## 2021-09-28 ENCOUNTER — MEDICAL CORRESPONDENCE (OUTPATIENT)
Dept: HEALTH INFORMATION MANAGEMENT | Facility: OTHER | Age: 66
End: 2021-09-28

## 2021-09-28 PROCEDURE — G0179 MD RECERTIFICATION HHA PT: HCPCS | Performed by: INTERNAL MEDICINE

## 2021-09-28 NOTE — TELEPHONE ENCOUNTER
PT/OT/Home Care order ok relayed.  Negin Turcios CMA([facility])..................9/28/2021   8:16 AM

## 2021-10-01 ENCOUNTER — TELEPHONE (OUTPATIENT)
Dept: INTERNAL MEDICINE | Facility: OTHER | Age: 66
End: 2021-10-01
Payer: COMMERCIAL

## 2021-10-01 DIAGNOSIS — I21.4 ACUTE MYOCARDIAL INFARCTION, SUBENDOCARDIAL INFARCTION, INITIAL EPISODE OF CARE (H): Primary | ICD-10-CM

## 2021-10-01 DIAGNOSIS — N18.4 CKD (CHRONIC KIDNEY DISEASE) STAGE 4, GFR 15-29 ML/MIN (H): Primary | ICD-10-CM

## 2021-10-01 NOTE — TELEPHONE ENCOUNTER
Dr Yin reviewed and completed the following home care or hospice form(s) for Miguel Parisi on 10/01/2021   This covers the certification period effective 09/28/20201 to 11/26/2021Rgarcia Hough LPN on 10/1/2021 at 1:27 PM

## 2021-10-04 ENCOUNTER — TRANSCRIBE ORDERS (OUTPATIENT)
Dept: OTHER | Age: 66
End: 2021-10-04

## 2021-10-04 ENCOUNTER — ANTICOAGULATION THERAPY VISIT (OUTPATIENT)
Dept: ANTICOAGULATION | Facility: OTHER | Age: 66
End: 2021-10-04
Attending: INTERNAL MEDICINE
Payer: COMMERCIAL

## 2021-10-04 DIAGNOSIS — Z86.73 HISTORY OF CEREBROVASCULAR ACCIDENT: ICD-10-CM

## 2021-10-04 DIAGNOSIS — N18.4 CKD (CHRONIC KIDNEY DISEASE) STAGE 4, GFR 15-29 ML/MIN (H): Primary | ICD-10-CM

## 2021-10-04 DIAGNOSIS — I25.2 HISTORY OF ST ELEVATION MYOCARDIAL INFARCTION (STEMI): ICD-10-CM

## 2021-10-04 DIAGNOSIS — Z86.74 HISTORY OF CARDIAC ARREST: ICD-10-CM

## 2021-10-04 DIAGNOSIS — I25.10 ATHEROSCLEROTIC HEART DISEASE OF NATIVE CORONARY ARTERY WITHOUT ANGINA PECTORIS: ICD-10-CM

## 2021-10-04 DIAGNOSIS — I73.9 PAD (PERIPHERAL ARTERY DISEASE) (H): Primary | ICD-10-CM

## 2021-10-04 DIAGNOSIS — Z86.2 HISTORY OF ITP: ICD-10-CM

## 2021-10-04 LAB — INR (EXTERNAL): 3.2 (ref 0.9–1.1)

## 2021-10-04 NOTE — PROGRESS NOTES
ANTICOAGULATION FOLLOW-UP CLINIC VISIT    Patient Name:  Miguel Parisi  Date:  10/4/2021  Contact Type:  Telephone/ spoke with Nanette mccarthy with [facility]    SUBJECTIVE:  Patient Findings         Clinical Outcomes     Negatives:  Major bleeding event, Thromboembolic event, Anticoagulation-related hospital admission, Anticoagulation-related ED visit, Anticoagulation-related fatality           OBJECTIVE    Recent labs: (last 7 days)     10/04/21  1545   INR 3.2*       ASSESSMENT / PLAN  INR assessment SUPRA    Recheck INR In: 2 WEEKS    [facility]R Location [facility]      Anticoagulation Summary  As of 10/4/2021    INR goal:  2.0-3.0   TTR:  68.5 % (1.5 mo)   INR used for dosing:  3.2 (10/4/2021)   Warfarin maintenance plan:  6 mg (2 mg x 3) every Mon; 4 mg (2 mg x 2) all other days   Full warfarin instructions:  6 mg every Mon; 4 mg all other days   Weekly warfarin total:  30 mg   Plan last modified:  Gissel Arechiga RN (10/4/2021)   Next INR check:  10/18/2021   Priority:  High   Target end date:  8/10/2027    Indications    PAD (peripheral artery disease) (H) [I73.9]  History of cerebrovascular accident [Z86.73]  History of ITP on 4/16/2021 [Z86.2]  H/O STEMI on 4/15/2021 [I25.2]  History of cardiac arrest [Z86.74]  Atherosclerotic heart disease of native coronary artery without angina pectoris [I25.10]             Anticoagulation Episode Summary     INR check location:      Preferred lab:      Send INR reminders to:  [facility]    Comments:        Anticoagulation Care Providers     Provider Role Specialty Phone number    Danii Ortiz NP Referring Internal Medicine [phone]            See the Encounter Report to view Anticoagulation Flowsheet and Dosing Calendar (Go to Encounters tab in chart review, and find the Anticoagulation Therapy Visit)        Gissel Arechiga, RN

## 2021-10-05 ENCOUNTER — OFFICE VISIT (OUTPATIENT)
Dept: CARDIOLOGY | Facility: OTHER | Age: 66
End: 2021-10-05
Attending: INTERNAL MEDICINE
Payer: COMMERCIAL

## 2021-10-05 VITALS
BODY MASS INDEX: 29 KG/M2 | RESPIRATION RATE: 12 BRPM | DIASTOLIC BLOOD PRESSURE: 68 MMHG | HEART RATE: 75 BPM | SYSTOLIC BLOOD PRESSURE: 124 MMHG | TEMPERATURE: 97.3 F | OXYGEN SATURATION: 100 % | WEIGHT: 207.8 LBS

## 2021-10-05 DIAGNOSIS — I27.20 MILD PULMONARY HYPERTENSION (H): ICD-10-CM

## 2021-10-05 DIAGNOSIS — I25.2 H/O NON-ST ELEVATION MYOCARDIAL INFARCTION (NSTEMI): ICD-10-CM

## 2021-10-05 DIAGNOSIS — Z86.73 HISTORY OF STROKE: ICD-10-CM

## 2021-10-05 DIAGNOSIS — N18.6 ESRD (END STAGE RENAL DISEASE) ON DIALYSIS (H): ICD-10-CM

## 2021-10-05 DIAGNOSIS — R26.89 IMPAIRED BALANCE AS LATE EFFECT OF CEREBROVASCULAR ACCIDENT (CVA): ICD-10-CM

## 2021-10-05 DIAGNOSIS — U07.1 CLINICAL DIAGNOSIS OF COVID-19: ICD-10-CM

## 2021-10-05 DIAGNOSIS — E83.39 HYPERPHOSPHATEMIA: ICD-10-CM

## 2021-10-05 DIAGNOSIS — I25.10 ATHEROSCLEROTIC CARDIOVASCULAR DISEASE: ICD-10-CM

## 2021-10-05 DIAGNOSIS — Z71.6 TOBACCO ABUSE COUNSELING: ICD-10-CM

## 2021-10-05 DIAGNOSIS — N18.6 END STAGE RENAL DISEASE ON DIALYSIS (H): ICD-10-CM

## 2021-10-05 DIAGNOSIS — I69.398 IMPAIRED BALANCE AS LATE EFFECT OF CEREBROVASCULAR ACCIDENT (CVA): ICD-10-CM

## 2021-10-05 DIAGNOSIS — Z86.2 HISTORY OF ITP: ICD-10-CM

## 2021-10-05 DIAGNOSIS — I50.42 CHRONIC COMBINED SYSTOLIC AND DIASTOLIC CONGESTIVE HEART FAILURE (H): ICD-10-CM

## 2021-10-05 DIAGNOSIS — I69.398 ABNORMALITY OF GAIT AS LATE EFFECT OF CEREBROVASCULAR ACCIDENT (CVA): ICD-10-CM

## 2021-10-05 DIAGNOSIS — I65.23 BILATERAL CAROTID ARTERY STENOSIS: ICD-10-CM

## 2021-10-05 DIAGNOSIS — G47.33 OBSTRUCTIVE SLEEP APNEA SYNDROME: ICD-10-CM

## 2021-10-05 DIAGNOSIS — I10 BENIGN ESSENTIAL HYPERTENSION: ICD-10-CM

## 2021-10-05 DIAGNOSIS — I25.2 HISTORY OF ST ELEVATION MYOCARDIAL INFARCTION (STEMI): ICD-10-CM

## 2021-10-05 DIAGNOSIS — E66.01 MORBID OBESITY (H): ICD-10-CM

## 2021-10-05 DIAGNOSIS — I25.5 GENERALIZED ISCHEMIC MYOCARDIAL DYSFUNCTION: ICD-10-CM

## 2021-10-05 DIAGNOSIS — D68.9 BLOOD COAGULATION DEFECT (H): ICD-10-CM

## 2021-10-05 DIAGNOSIS — Z99.2 END STAGE RENAL DISEASE ON DIALYSIS (H): ICD-10-CM

## 2021-10-05 DIAGNOSIS — R26.9 ABNORMALITY OF GAIT AS LATE EFFECT OF CEREBROVASCULAR ACCIDENT (CVA): ICD-10-CM

## 2021-10-05 DIAGNOSIS — I73.9 PAD (PERIPHERAL ARTERY DISEASE) (H): ICD-10-CM

## 2021-10-05 DIAGNOSIS — R53.1 WEAKNESS OF LEFT SIDE OF BODY: ICD-10-CM

## 2021-10-05 DIAGNOSIS — I25.810 CORONARY ARTERY DISEASE INVOLVING CORONARY BYPASS GRAFT OF NATIVE HEART WITHOUT ANGINA PECTORIS: ICD-10-CM

## 2021-10-05 DIAGNOSIS — Z99.2 ESRD (END STAGE RENAL DISEASE) ON DIALYSIS (H): ICD-10-CM

## 2021-10-05 DIAGNOSIS — I25.10 ATHEROSCLEROSIS OF NATIVE CORONARY ARTERY OF NATIVE HEART WITHOUT ANGINA PECTORIS: ICD-10-CM

## 2021-10-05 DIAGNOSIS — Z86.73 HISTORY OF CEREBROVASCULAR ACCIDENT: ICD-10-CM

## 2021-10-05 DIAGNOSIS — Z95.1 HISTORY OF CORONARY ARTERY BYPASS SURGERY: ICD-10-CM

## 2021-10-05 DIAGNOSIS — Z86.74 HISTORY OF CARDIAC ARREST: ICD-10-CM

## 2021-10-05 DIAGNOSIS — I48.0 PAROXYSMAL ATRIAL FIBRILLATION (H): Primary | ICD-10-CM

## 2021-10-05 DIAGNOSIS — Z86.74 HISTORY OF SUDDEN CARDIAC ARREST SUCCESSFULLY RESUSCITATED: ICD-10-CM

## 2021-10-05 DIAGNOSIS — I50.42 CHRONIC COMBINED SYSTOLIC (CONGESTIVE) AND DIASTOLIC (CONGESTIVE) HEART FAILURE (H): ICD-10-CM

## 2021-10-05 DIAGNOSIS — I25.10 CORONARY ARTERY DISEASE INVOLVING NATIVE CORONARY ARTERY OF NATIVE HEART WITHOUT ANGINA PECTORIS: ICD-10-CM

## 2021-10-05 DIAGNOSIS — Z87.891 HISTORY OF TOBACCO ABUSE: ICD-10-CM

## 2021-10-05 DIAGNOSIS — E78.2 MIXED HYPERLIPIDEMIA: ICD-10-CM

## 2021-10-05 LAB
ATRIAL RATE - MUSE: 76 BPM
DIASTOLIC BLOOD PRESSURE - MUSE: NORMAL MMHG
INTERPRETATION ECG - MUSE: NORMAL
P AXIS - MUSE: 31 DEGREES
PR INTERVAL - MUSE: 360 MS
QRS DURATION - MUSE: 126 MS
QT - MUSE: 534 MS
QTC - MUSE: 600 MS
R AXIS - MUSE: 267 DEGREES
SYSTOLIC BLOOD PRESSURE - MUSE: NORMAL MMHG
T AXIS - MUSE: 118 DEGREES
VENTRICULAR RATE- MUSE: 76 BPM

## 2021-10-05 PROCEDURE — 93005 ELECTROCARDIOGRAM TRACING: CPT | Performed by: INTERNAL MEDICINE

## 2021-10-05 PROCEDURE — 93010 ELECTROCARDIOGRAM REPORT: CPT | Performed by: INTERNAL MEDICINE

## 2021-10-05 PROCEDURE — G0463 HOSPITAL OUTPT CLINIC VISIT: HCPCS

## 2021-10-05 PROCEDURE — G0463 HOSPITAL OUTPT CLINIC VISIT: HCPCS | Mod: 25

## 2021-10-05 PROCEDURE — 99214 OFFICE O/P EST MOD 30 MIN: CPT | Performed by: INTERNAL MEDICINE

## 2021-10-05 RX ORDER — CARVEDILOL 6.25 MG/1
6.25 TABLET ORAL 2 TIMES DAILY WITH MEALS
Qty: 180 TABLET | Refills: 1 | Status: SHIPPED | OUTPATIENT
Start: 2021-10-05 | End: 2022-01-11

## 2021-10-05 RX ORDER — FUROSEMIDE 80 MG
80 TABLET ORAL DAILY
Qty: 90 TABLET | Refills: 3 | Status: SHIPPED | OUTPATIENT
Start: 2021-10-05 | End: 2022-01-11

## 2021-10-05 ASSESSMENT — PAIN SCALES - GENERAL: PAINLEVEL: NO PAIN (0)

## 2021-10-05 NOTE — PATIENT INSTRUCTIONS
You were seen by  Nick Wallace DO     1. Echocardiogram has been ordered to be done in 3 months. You will be called to schedule this.  You will receive instructions for testing at that time.  You will be contacted with results.     2. Increase your lasix to the following:  furosemide (LASIX) 80 MG tablet        Sig - Route: Take 1 tablet (80 mg) by mouth daily        3. Change your Coreg to the following:  carvedilol (COREG) 6.25 MG tablet        Sig - Route: Take 1 tablet (6.25 mg) by mouth 2 times daily (with meals)       4. Discontinue your hydrochlorothiazide.      You will follow up with [facility] in 3 months after having your echocardiogram, sooner if needed.     Please call the cardiology office with problems, questions, or concerns at [phone].    If you experience chest pain, chest pressure, chest tightness, shortness of breath, fainting, lightheadedness, nausea, vomiting, or other concerning symptoms, please report to the Emergency Department or call 911. These symptoms may be emergent, and best treated in the Emergency Department.     Cardiology Nurses  SARA Doherty, SARA Amanda, TANVIN  TANVI PutnamN  [facility] ([address])  [phone]

## 2021-10-06 ENCOUNTER — PATIENT OUTREACH (OUTPATIENT)
Dept: CARE COORDINATION | Facility: CLINIC | Age: 66
End: 2021-10-06

## 2021-10-06 NOTE — PROGRESS NOTES
Called and talked with patient in regards to scheduling an appointment for CHF education patient will look at his scheduled and will call writer when he has a date pick out.  Patient was provided with writers number to call with date.  Bessy Kohler RN on 10/6/2021 at 1:11 PM

## 2021-10-18 ENCOUNTER — ANTICOAGULATION THERAPY VISIT (OUTPATIENT)
Dept: ANTICOAGULATION | Facility: OTHER | Age: 66
End: 2021-10-18
Attending: INTERNAL MEDICINE
Payer: COMMERCIAL

## 2021-10-18 DIAGNOSIS — I25.10 ATHEROSCLEROSIS OF NATIVE CORONARY ARTERY OF NATIVE HEART WITHOUT ANGINA PECTORIS: ICD-10-CM

## 2021-10-18 DIAGNOSIS — Z86.73 HISTORY OF CEREBROVASCULAR ACCIDENT: ICD-10-CM

## 2021-10-18 DIAGNOSIS — Z86.74 HISTORY OF CARDIAC ARREST: ICD-10-CM

## 2021-10-18 DIAGNOSIS — Z86.2 HISTORY OF ITP: ICD-10-CM

## 2021-10-18 DIAGNOSIS — I25.2 HISTORY OF ST ELEVATION MYOCARDIAL INFARCTION (STEMI): ICD-10-CM

## 2021-10-18 DIAGNOSIS — I73.9 PAD (PERIPHERAL ARTERY DISEASE) (H): Primary | ICD-10-CM

## 2021-10-18 LAB — INR (EXTERNAL): 3.2 (ref 0.9–1.1)

## 2021-10-18 NOTE — PROGRESS NOTES
ANTICOAGULATION FOLLOW-UP CLINIC VISIT    Patient Name:  Miguel Parisi  Date:  10/18/2021  Contact Type:  Telephone/ spoke with Nanette reviewed dosing    SUBJECTIVE:  Patient Findings         Clinical Outcomes     Negatives:  Major bleeding event, Thromboembolic event, Anticoagulation-related hospital admission, Anticoagulation-related ED visit, Anticoagulation-related fatality           OBJECTIVE    Recent labs: (last 7 days)     10/18/21  1601   INR 3.2*       ASSESSMENT / PLAN  INR assessment SUPRA    Recheck INR In: 2 WEEKS    [facility]R Location [facility]      Anticoagulation Summary  As of 10/18/2021    INR goal:  2.0-3.0   TTR:  52.4 % (2 mo)   INR used for dosing:  3.2 (10/18/2021)   Warfarin maintenance plan:  4 mg (2 mg x 2) every day   Full warfarin instructions:  4 mg every day   Weekly warfarin total:  28 mg   Plan last modified:  Gissel Arechiga RN (10/18/2021)   Next INR check:  11/1/2021   Priority:  High   Target end date:  8/10/2027    Indications    PAD (peripheral artery disease) (H) [I73.9]  History of cerebrovascular accident [Z86.73]  History of ITP on 4/16/2021 [Z86.2]  H/O STEMI on 4/15/2021 [I25.2]  History of cardiac arrest [Z86.74]  Atherosclerosis of native coronary artery of native heart without angina pectoris [I25.10]             Anticoagulation Episode Summary     INR check location:      Preferred lab:      Send INR reminders to:  [facility]    Comments:        Anticoagulation Care Providers     Provider Role Specialty Phone number    ToristellaDanii NP Referring Internal Medicine [phone]            See the Encounter Report to view Anticoagulation Flowsheet and Dosing Calendar (Go to Encounters tab in chart review, and find the Anticoagulation Therapy Visit)        Gissel Arechiga, RN

## 2021-10-21 ENCOUNTER — TELEPHONE (OUTPATIENT)
Dept: CARDIOLOGY | Facility: OTHER | Age: 66
End: 2021-10-21

## 2021-10-21 NOTE — LETTER
Miguel Parisi  [address] [place] [address]        Date: October 26, 2021    Dear Miguel Rutledge,  I am an RN clinic care coordinator who works with Cardiology at [facility] and [facility]. I have tried to contact you via phone to see if you would be interested in Cardiology Care Coordination specifically for Heart Failure without success.  Below is a description of clinic care coordination and how I can further assist you if you chose to enroll in our program. It is completely free.      The clinic care coordinator is a registered nurse and/or  who understand the health care system. The goal of clinic care coordination is to help you manage your health and improve access to the [facility] system in the most efficient manner. The registered nurse can assist you in meeting your health care goals by providing education, coordinating services, and strengthening the communication among your providers.   Please feel free to contact me at [phone] or [phone] ask for Cardiology, with any questions or concerns. We at [facility] are focused on providing you with the highest-quality healthcare experience possible and that all starts with you.      Sincerely,      Bessy Kohler, RN  Clinic Care Coordination

## 2021-10-21 NOTE — TELEPHONE ENCOUNTER
Sent a letter in regards to CHF care coordination.  Bessy Kohler RN on 10/26/2021 at 1:14 PM      Called patient to get care coordination education set up for CHF there was no answer unable to leave message.  Bessy Kohler RN on 10/21/2021 at 2:54 PM  Patient has also been contacted for a referral to the [facility] [place]. They  have been unable to reach the patient after at least three contact attempts.  Nephrology did leave there number for the office at Dept: [phone].  Bessy Kohler RN on 10/21/2021 at 2:58 PM      ---- Message -----   From: Bessy Kohler RN   Sent: 10/11/2021   To: Bessy Kohler RN     Talked with patient he will call when he has a date picked up to come in for CHF care coordination education.   ----- Message -----   From: Nick Wallace DO   Sent: 10/5/2021  12:25 PM CDT   To: Bessy Kohler RN     He is agreeable to be set up for heart failure clinic.  I told him you would call him over the next few days.     Dr. Wallace   ----- Message -----   From: Bessy Kohler RN   Sent: 10/5/2021   9:39 AM CDT   To: DO Chitra Nolan Dr. I see this patient is going to be seen you today at 11:15AM know we were going to do an enrollment with him if you could just mention this to him at his appointment I will give him a call and set up a time and date over the education with him.  Thank you so much in advance Bessy RUIZ   ----- Message -----

## 2021-11-01 ENCOUNTER — ANTICOAGULATION THERAPY VISIT (OUTPATIENT)
Dept: ANTICOAGULATION | Facility: OTHER | Age: 66
End: 2021-11-01
Attending: INTERNAL MEDICINE
Payer: COMMERCIAL

## 2021-11-01 DIAGNOSIS — Z86.73 HISTORY OF CEREBROVASCULAR ACCIDENT: ICD-10-CM

## 2021-11-01 DIAGNOSIS — I73.9 PAD (PERIPHERAL ARTERY DISEASE) (H): Primary | ICD-10-CM

## 2021-11-01 DIAGNOSIS — I25.10 ATHEROSCLEROSIS OF NATIVE CORONARY ARTERY OF NATIVE HEART WITHOUT ANGINA PECTORIS: ICD-10-CM

## 2021-11-01 DIAGNOSIS — I25.2 HISTORY OF ST ELEVATION MYOCARDIAL INFARCTION (STEMI): ICD-10-CM

## 2021-11-01 DIAGNOSIS — Z86.2 HISTORY OF ITP: ICD-10-CM

## 2021-11-01 DIAGNOSIS — Z86.74 HISTORY OF CARDIAC ARREST: ICD-10-CM

## 2021-11-01 LAB — INR (EXTERNAL): 1.6 (ref 0.9–1.1)

## 2021-11-01 NOTE — PROGRESS NOTES
ANTICOAGULATION FOLLOW-UP CLINIC VISIT    Patient Name:  Miguel Parisi  Date:  2021  Contact Type:  Telephone/ spoke with Nanette mccarthy with [facility]    SUBJECTIVE:  Patient Findings     Comments:  Patient denies any identifiable changes that caused the supratherapeutic INR.           Clinical Outcomes     Negatives:  Major bleeding event, Thromboembolic event, Anticoagulation-related hospital admission, Anticoagulation-related ED visit, Anticoagulation-related fatality    Comments:  Patient denies any identifiable changes that caused the supratherapeutic INR.              OBJECTIVE    Recent labs: (last 7 days)     21  1423   INR 1.6*       ASSESSMENT / PLAN  INR assessment SUB    Recheck INR In: 2 WEEKS    INR Location [facility]      Anticoagulation Summary  As of 2021    INR goal:  2.0-3.0   TTR:  54.3 % (2.4 mo)   INR used for dosin.6 (2021)   Warfarin maintenance plan:  6 mg (2 mg x 3) every Mon; 4 mg (2 mg x 2) all other days   Full warfarin instructions:  6 mg every Mon; 4 mg all other days   Weekly warfarin total:  30 mg   Plan last modified:  Gissel Arechiga, RN (2021)   Next INR check:  11/15/2021   Priority:  High   Target end date:  8/10/2027    Indications    PAD (peripheral artery disease) (H) [I73.9]  History of cerebrovascular accident [Z86.73]  History of ITP on 2021 [Z86.2]  H/O STEMI on 4/15/2021 [I25.2]  History of cardiac arrest [Z86.74]  Atherosclerosis of native coronary artery of native heart without angina pectoris [I25.10]             Anticoagulation Episode Summary     INR check location:      Preferred lab:      Send INR reminders to:  [facility]    Comments:        Anticoagulation Care Providers     Provider Role Specialty Phone number    Danii Ortiz NP Referring Internal Medicine [phone]            See the Encounter Report to view Anticoagulation Flowsheet and Dosing Calendar (Go to Encounters tab in chart review,  and find the Anticoagulation Therapy Visit)        Gissel Arechiga, RN

## 2021-11-04 ENCOUNTER — PATIENT OUTREACH (OUTPATIENT)
Dept: INTERNAL MEDICINE | Facility: OTHER | Age: 66
End: 2021-11-04

## 2021-11-04 NOTE — TELEPHONE ENCOUNTER
Patient Quality Outreach      Summary:    Patient has the following on his problem list/HM:     Diabetes    Last A1C:  Lab Results   Component Value Date    A1C 5.6 08/10/2021    A1C 6.1 2021       Last LDL:    Lab Results   Component Value Date    LDL 65 08/10/2021    LDL 63 2021       Is the patient on a Statin? Yes          Is the patient on Aspirin? Yes    Medications     HMG CoA Reductase Inhibitors     atorvastatin (LIPITOR) 80 MG tablet       Salicylates     aspirin (ASA) 81 MG chewable tablet             Last three blood pressure readings:  BP Readings from Last 3 Encounters:   10/05/21 124/68   21 136/78   21 118/60            Tobacco Use      Smoking status: Former Smoker        Packs/day: 1.00        Types: Cigarettes        Quit date: 1990        Years since quittin.8      Smokeless tobacco: Never Used          Patient is due/failing the following:   Diabetes -  Eye Exam    Type of outreach:    Sent letter.    Questions for provider review:    None                                                                                                                                     Ary Bell LPN .............2021  4:31 PM       Chart routed to MANJIT.

## 2021-11-04 NOTE — LETTER
November 4, 2021      Miguel Parisi  [id number] [facility] [address]      Your healthcare team cares about your health. To provide you with the best care,   we have reviewed your chart and based on our findings, we see that you are due to:     - DIABETES FOLLOW UP: Schedule a DIABETIC EYE EXAM.  This exam is done with an optometrist. You can schedule this appointment with your eye doctor.  If you need a referral, please let us know.    If you have already completed these items, please contact the clinic via phone or   [employer] so your care team can review and update your records. Thank you for   choosing [facility] for your healthcare needs. For any questions,   concerns, or to schedule an appointment please contact the clinic.       Healthy Regards,      Your [facility] Care Team          Enclosure: N/A

## 2021-11-15 ENCOUNTER — ANTICOAGULATION THERAPY VISIT (OUTPATIENT)
Dept: ANTICOAGULATION | Facility: OTHER | Age: 66
End: 2021-11-15
Attending: INTERNAL MEDICINE
Payer: COMMERCIAL

## 2021-11-15 DIAGNOSIS — I73.9 PAD (PERIPHERAL ARTERY DISEASE) (H): Primary | ICD-10-CM

## 2021-11-15 DIAGNOSIS — Z86.73 HISTORY OF CEREBROVASCULAR ACCIDENT: ICD-10-CM

## 2021-11-15 DIAGNOSIS — Z86.74 HISTORY OF CARDIAC ARREST: ICD-10-CM

## 2021-11-15 DIAGNOSIS — Z86.2 HISTORY OF ITP: ICD-10-CM

## 2021-11-15 DIAGNOSIS — I25.2 HISTORY OF ST ELEVATION MYOCARDIAL INFARCTION (STEMI): ICD-10-CM

## 2021-11-15 DIAGNOSIS — I25.10 ATHEROSCLEROSIS OF NATIVE CORONARY ARTERY OF NATIVE HEART WITHOUT ANGINA PECTORIS: ICD-10-CM

## 2021-11-15 LAB — INR (EXTERNAL): 4.1 (ref 0.9–1.1)

## 2021-11-15 NOTE — PROGRESS NOTES
ANTICOAGULATION FOLLOW-UP CLINIC VISIT    Patient Name:  Miguel Parisi  Date:  11/15/2021  Contact Type:  Telephone/ spoke with Nanette mccarthy with [facility]    SUBJECTIVE:  Patient Findings     Comments:  Patient denies any identifiable changes that caused the supratherapeutic INR.           Clinical Outcomes     Negatives:  Major bleeding event, Thromboembolic event, Anticoagulation-related hospital admission, Anticoagulation-related ED visit, Anticoagulation-related fatality    Comments:  Patient denies any identifiable changes that caused the supratherapeutic INR.              OBJECTIVE    Recent labs: (last 7 days)     11/15/21  1237   INR 4.1*       ASSESSMENT / PLAN  INR assessment SUPRA    Recheck INR In: 1 WEEK    [facility]R Location [facility]      Anticoagulation Summary  As of 11/15/2021    INR goal:  2.0-3.0   TTR:  52.0 % (2.9 mo)   INR used for dosin.1 (11/15/2021)   Warfarin maintenance plan:  6 mg (2 mg x 3) every Mon; 4 mg (2 mg x 2) all other days   Full warfarin instructions:  11/15: Hold; Otherwise 6 mg every Mon; 4 mg all other days   Weekly warfarin total:  30 mg   Plan last modified:  Gissel Arechiga, RN (2021)   Next INR check:  2021   Priority:  High   Target end date:  8/10/2027    Indications    PAD (peripheral artery disease) (H) [I73.9]  History of cerebrovascular accident [Z86.73]  History of ITP on 2021 [Z86.2]  H/O STEMI on 4/15/2021 [I25.2]  History of cardiac arrest [Z86.74]  Atherosclerosis of native coronary artery of native heart without angina pectoris [I25.10]             Anticoagulation Episode Summary     INR check location:      Preferred lab:      Send INR reminders to:  [facility]    Comments:        Anticoagulation Care Providers     Provider Role Specialty Phone number    Danii Ortiz NP Referring Internal Medicine [phone]            See the Encounter Report to view Anticoagulation Flowsheet and Dosing Calendar (Go to  Encounters tab in chart review, and find the Anticoagulation Therapy Visit)        Gissel Arechiga RN

## 2021-11-23 ENCOUNTER — ANTICOAGULATION THERAPY VISIT (OUTPATIENT)
Dept: ANTICOAGULATION | Facility: OTHER | Age: 66
End: 2021-11-23
Attending: INTERNAL MEDICINE
Payer: COMMERCIAL

## 2021-11-23 DIAGNOSIS — Z86.74 HISTORY OF CARDIAC ARREST: ICD-10-CM

## 2021-11-23 DIAGNOSIS — Z86.2 HISTORY OF ITP: ICD-10-CM

## 2021-11-23 DIAGNOSIS — Z86.73 HISTORY OF CEREBROVASCULAR ACCIDENT: ICD-10-CM

## 2021-11-23 DIAGNOSIS — I73.9 PAD (PERIPHERAL ARTERY DISEASE) (H): Primary | ICD-10-CM

## 2021-11-23 DIAGNOSIS — I25.10 ATHEROSCLEROSIS OF NATIVE CORONARY ARTERY OF NATIVE HEART WITHOUT ANGINA PECTORIS: ICD-10-CM

## 2021-11-23 DIAGNOSIS — I25.2 HISTORY OF ST ELEVATION MYOCARDIAL INFARCTION (STEMI): ICD-10-CM

## 2021-11-23 LAB — INR (EXTERNAL): 3.1 (ref 0.9–1.1)

## 2021-11-23 NOTE — PROGRESS NOTES
ANTICOAGULATION FOLLOW-UP CLINIC VISIT    Patient Name:  Miguel Parisi  Date:  11/23/2021  Contact Type:  Telephone/ spoke with [facility] nurse with [facility]    SUBJECTIVE:  Patient Findings         Clinical Outcomes     Negatives:  Major bleeding event, Thromboembolic event, Anticoagulation-related hospital admission, Anticoagulation-related ED visit, Anticoagulation-related fatality           OBJECTIVE    Recent labs: (last 7 days)     11/23/21  1550   INR 3.1*       ASSESSMENT / PLAN  INR assessment SUPRA    Recheck INR In: 1 WEEK    [facility]R Location [facility]      Anticoagulation Summary  As of 11/23/2021    INR goal:  2.0-3.0   TTR:  47.6 % (3.2 mo)   INR used for dosing:  3.1 (11/23/2021)   Warfarin maintenance plan:  4 mg (2 mg x 2) every day   Full warfarin instructions:  4 mg every day   Weekly warfarin total:  28 mg   Plan last modified:  Gissel Arechiga, RN (11/23/2021)   Next INR check:  11/30/2021   Priority:  High   Target end date:  8/10/2027    Indications    PAD (peripheral artery disease) (H) [I73.9]  History of cerebrovascular accident [Z86.73]  History of ITP on 4/16/2021 [Z86.2]  H/O STEMI on 4/15/2021 [I25.2]  History of cardiac arrest [Z86.74]  Atherosclerosis of native coronary artery of native heart without angina pectoris [I25.10]             Anticoagulation Episode Summary     INR check location:      Preferred lab:      Send INR reminders to:  [facility]    Comments:        Anticoagulation Care Providers     Provider Role Specialty Phone number    Danii Ortiz NP Referring Internal Medicine [phone]            See the Encounter Report to view Anticoagulation Flowsheet and Dosing Calendar (Go to Encounters tab in chart review, and find the Anticoagulation Therapy Visit)        Gissel Arechiga, RN

## 2021-11-29 ENCOUNTER — PATIENT OUTREACH (OUTPATIENT)
Dept: FAMILY MEDICINE | Facility: OTHER | Age: 66
End: 2021-11-29
Payer: COMMERCIAL

## 2021-11-29 NOTE — PROGRESS NOTES
"Clinic Care Coordination Contact  Care Team Conversations    Per record review for HF CC,  pt pending HF Clinic enrollment.     Pt scheduled for OV with [facility]: 2022 with echo prior.   CC follow up for echo appointment.    Call to patient, Verified patient's name/. Introduced self and role as HF CC.   Pt states he received a letter to schedule his echo before his appointment. Writer did send message to scheduling to contact patient.    Pt denies SOB. Reports always has extra fluid-mainly left leg. \"Dialysis will remove the fluid\".      Offered to schedule an appointment for enrollment when he comes in for echo or after his office visit with [facility]. Provided direct contact number with questions/concerns.    Plan: CC to follow up with patient following OV with [facility] if not enrolled by that date.   Nicky Del Angel RN ,....................  2021   2:15 PM        "

## 2021-12-06 ENCOUNTER — ANTICOAGULATION THERAPY VISIT (OUTPATIENT)
Dept: ANTICOAGULATION | Facility: OTHER | Age: 66
End: 2021-12-06
Attending: INTERNAL MEDICINE
Payer: COMMERCIAL

## 2021-12-06 DIAGNOSIS — Z86.2 HISTORY OF ITP: ICD-10-CM

## 2021-12-06 DIAGNOSIS — I25.10 ATHEROSCLEROSIS OF NATIVE CORONARY ARTERY OF NATIVE HEART WITHOUT ANGINA PECTORIS: ICD-10-CM

## 2021-12-06 DIAGNOSIS — Z86.74 HISTORY OF CARDIAC ARREST: ICD-10-CM

## 2021-12-06 DIAGNOSIS — I25.2 HISTORY OF ST ELEVATION MYOCARDIAL INFARCTION (STEMI): ICD-10-CM

## 2021-12-06 DIAGNOSIS — I73.9 PAD (PERIPHERAL ARTERY DISEASE) (H): Primary | ICD-10-CM

## 2021-12-06 DIAGNOSIS — Z86.73 HISTORY OF CEREBROVASCULAR ACCIDENT: ICD-10-CM

## 2021-12-06 LAB — INR (EXTERNAL): 4 (ref 0.9–1.1)

## 2021-12-06 NOTE — PROGRESS NOTES
ANTICOAGULATION FOLLOW-UP CLINIC VISIT    Patient Name:  Miguel Parisi  Date:  2021  Contact Type:  Telephone/ spoke with Nanette mccarthy with [facility]    SUBJECTIVE:  Patient Findings         Clinical Outcomes     Negatives:  Major bleeding event, Thromboembolic event, Anticoagulation-related hospital admission, Anticoagulation-related ED visit, Anticoagulation-related fatality           OBJECTIVE    Recent labs: (last 7 days)     21  1506   INR 4.0*       ASSESSMENT / PLAN  INR assessment SUPRA    Recheck INR In: 1 WEEK    [facility]R Location [facility]      Anticoagulation Summary  As of 2021    INR goal:  2.0-3.0   TTR:  41.9 % (3.6 mo)   INR used for dosin.0 (2021)   Warfarin maintenance plan:  4 mg (2 mg x 2) every day   Full warfarin instructions:  : Hold; Otherwise 4 mg every day   Weekly warfarin total:  28 mg   Plan last modified:  Gissel Arechiga, RN (2021)   Next INR check:  2021   Priority:  High   Target end date:  8/10/2027    Indications    PAD (peripheral artery disease) (H) [I73.9]  History of cerebrovascular accident [Z86.73]  History of ITP on 2021 [Z86.2]  H/O STEMI on 4/15/2021 [I25.2]  History of cardiac arrest [Z86.74]  Atherosclerosis of native coronary artery of native heart without angina pectoris [I25.10]             Anticoagulation Episode Summary     INR check location:      Preferred lab:      Send INR reminders to:  [facility]    Comments:        Anticoagulation Care Providers     Provider Role Specialty Phone number    Danii Ortiz NP Referring Internal Medicine [phone]            See the Encounter Report to view Anticoagulation Flowsheet and Dosing Calendar (Go to Encounters tab in chart review, and find the Anticoagulation Therapy Visit)        Gissel Arechiga, RN

## 2021-12-13 ENCOUNTER — ANTICOAGULATION THERAPY VISIT (OUTPATIENT)
Dept: ANTICOAGULATION | Facility: OTHER | Age: 66
End: 2021-12-13
Attending: INTERNAL MEDICINE
Payer: COMMERCIAL

## 2021-12-13 DIAGNOSIS — I25.10 ATHEROSCLEROSIS OF NATIVE CORONARY ARTERY OF NATIVE HEART WITHOUT ANGINA PECTORIS: ICD-10-CM

## 2021-12-13 DIAGNOSIS — Z86.2 HISTORY OF ITP: ICD-10-CM

## 2021-12-13 DIAGNOSIS — Z86.74 HISTORY OF CARDIAC ARREST: ICD-10-CM

## 2021-12-13 DIAGNOSIS — Z86.73 HISTORY OF CEREBROVASCULAR ACCIDENT: ICD-10-CM

## 2021-12-13 DIAGNOSIS — I73.9 PAD (PERIPHERAL ARTERY DISEASE) (H): Primary | ICD-10-CM

## 2021-12-13 DIAGNOSIS — I25.2 HISTORY OF ST ELEVATION MYOCARDIAL INFARCTION (STEMI): ICD-10-CM

## 2021-12-13 LAB — INR (EXTERNAL): 2.7 (ref 0.9–1.1)

## 2021-12-13 NOTE — PROGRESS NOTES
ANTICOAGULATION FOLLOW-UP CLINIC VISIT    Patient Name:  Miguel Parisi  Date:  2021  Contact Type:  Telephone/ spoke with Nanette mccarthy with [facility]    SUBJECTIVE:  Patient Findings         Clinical Outcomes     Negatives:  Major bleeding event, Thromboembolic event, Anticoagulation-related hospital admission, Anticoagulation-related ED visit, Anticoagulation-related fatality           OBJECTIVE    Recent labs: (last 7 days)     21  1503   INR 2.7*       ASSESSMENT / PLAN  INR assessment THER    Recheck INR In: 1 WEEK    INR Location [facility]      Anticoagulation Summary  As of 2021    INR goal:  2.0-3.0   TTR:  40.8 % (3.8 mo)   INR used for dosin.7 (2021)   Warfarin maintenance plan:  2 mg (2 mg x 1) every Mon; 4 mg (2 mg x 2) all other days   Full warfarin instructions:  2 mg every Mon; 4 mg all other days   Weekly warfarin total:  26 mg   Plan last modified:  Gissel Arechiga RN (2021)   Next INR check:  2021   Priority:  High   Target end date:  8/10/2027    Indications    PAD (peripheral artery disease) (H) [I73.9]  History of cerebrovascular accident [Z86.73]  History of ITP on 2021 [Z86.2]  H/O STEMI on 4/15/2021 [I25.2]  History of cardiac arrest [Z86.74]  Atherosclerosis of native coronary artery of native heart without angina pectoris [I25.10]             Anticoagulation Episode Summary     INR check location:      Preferred lab:      Send INR reminders to:  [facility]    Comments:        Anticoagulation Care Providers     Provider Role Specialty Phone number    Danii Ortiz NP Referring Internal Medicine [phone]            See the Encounter Report to view Anticoagulation Flowsheet and Dosing Calendar (Go to Encounters tab in chart review, and find the Anticoagulation Therapy Visit)        Gissel Arechiga, RN

## 2021-12-21 ENCOUNTER — ANTICOAGULATION THERAPY VISIT (OUTPATIENT)
Dept: ANTICOAGULATION | Facility: OTHER | Age: 66
End: 2021-12-21
Attending: INTERNAL MEDICINE
Payer: COMMERCIAL

## 2021-12-21 DIAGNOSIS — I25.10 ATHEROSCLEROSIS OF NATIVE CORONARY ARTERY OF NATIVE HEART WITHOUT ANGINA PECTORIS: ICD-10-CM

## 2021-12-21 DIAGNOSIS — I25.2 HISTORY OF ST ELEVATION MYOCARDIAL INFARCTION (STEMI): ICD-10-CM

## 2021-12-21 DIAGNOSIS — Z86.74 HISTORY OF CARDIAC ARREST: ICD-10-CM

## 2021-12-21 DIAGNOSIS — Z86.73 HISTORY OF CEREBROVASCULAR ACCIDENT: ICD-10-CM

## 2021-12-21 DIAGNOSIS — I73.9 PAD (PERIPHERAL ARTERY DISEASE) (H): Primary | ICD-10-CM

## 2021-12-21 DIAGNOSIS — Z86.2 HISTORY OF ITP: ICD-10-CM

## 2021-12-21 LAB — INR (EXTERNAL): 2.8 (ref 0.9–1.1)

## 2021-12-21 NOTE — PROGRESS NOTES
ANTICOAGULATION FOLLOW-UP CLINIC VISIT    Patient Name:  Miguel Parisi  Date:  2021  Contact Type:  Telephone/ spoke with Nanette mccarthy with [facility]    SUBJECTIVE:  Patient Findings         Clinical Outcomes     Negatives:  Major bleeding event, Thromboembolic event, Anticoagulation-related hospital admission, Anticoagulation-related ED visit, Anticoagulation-related fatality           OBJECTIVE    Recent labs: (last 7 days)     21  1312   INR 2.8*       ASSESSMENT / PLAN  INR assessment THER    Recheck INR In: 2 WEEKS    [facility] Location [facility]      Anticoagulation Summary  As of 2021    INR goal:  2.0-3.0   TTR:  44.6 % (4.1 mo)   INR used for dosin.8 (2021)   Warfarin maintenance plan:  2 mg (2 mg x 1) every Mon; 4 mg (2 mg x 2) all other days   Full warfarin instructions:  2 mg every Mon; 4 mg all other days   Weekly warfarin total:  26 mg   No change documented:  Gissel Arechiga RN   Plan last modified:  Gissel Arechiga RN (2021)   Next INR check:  2022   Priority:  High   Target end date:  8/10/2027    Indications    PAD (peripheral artery disease) (H) [I73.9]  History of cerebrovascular accident [Z86.73]  History of ITP on 2021 [Z86.2]  H/O STEMI on 4/15/2021 [I25.2]  History of cardiac arrest [Z86.74]  Atherosclerosis of native coronary artery of native heart without angina pectoris [I25.10]             Anticoagulation Episode Summary     INR check location:      Preferred lab:      Send INR reminders to:  [facility]    Comments:        Anticoagulation Care Providers     Provider Role Specialty Phone number    Danii Ortiz NP Referring Internal Medicine [phone]            See the Encounter Report to view Anticoagulation Flowsheet and Dosing Calendar (Go to Encounters tab in chart review, and find the Anticoagulation Therapy Visit)        Gissel Arechiga RN

## 2021-12-30 DIAGNOSIS — Z79.01 ANTICOAGULATION MONITORING, INR RANGE 2-3: ICD-10-CM

## 2021-12-30 DIAGNOSIS — I48.0 PAROXYSMAL ATRIAL FIBRILLATION (H): Primary | ICD-10-CM

## 2021-12-30 RX ORDER — WARFARIN SODIUM 2 MG/1
TABLET ORAL
Qty: 180 TABLET | Refills: 1 | Status: SHIPPED | OUTPATIENT
Start: 2021-12-30 | End: 2022-09-30

## 2021-12-30 NOTE — TELEPHONE ENCOUNTER
"Requested Prescriptions   Pending Prescriptions Disp Refills     warfarin ANTICOAGULANT (COUMADIN) 2 MG tablet 180 tablet 1     Si mg x 6 days and 2 mg x 1 day/week or as directed by [facility]       Vitamin K Antagonists Failed - 2021  4:11 PM        Failed - INR is within goal in the past 6 weeks     Confirm INR is within goal in the past 6 weeks.     Recent Labs   Lab Test 21  1312   INR 2.8*                       Passed - Recent (12 mo) or future (30 days) visit within the authorizing provider's specialty     Patient has had an office visit with the authorizing provider or a provider within the authorizing providers department within the previous 12 mos or has a future within next 30 days. See \"Patient Info\" tab in inbasket, or \"Choose Columns\" in Meds & Orders section of the refill encounter.              Passed - Medication is active on med list        Passed - Patient is 18 years of age or older           Prescription refilled per RN MedicationRefill Policy.................... Gissel Arechiga RN ....................  2021   4:12 PM      "

## 2022-01-01 ENCOUNTER — HOSPITAL ENCOUNTER (OUTPATIENT)
Dept: PHYSICAL THERAPY | Facility: OTHER | Age: 67
Setting detail: THERAPIES SERIES
Discharge: HOME | End: 2022-12-27
Attending: INTERNAL MEDICINE
Payer: COMMERCIAL

## 2022-01-01 ENCOUNTER — HOSPITAL ENCOUNTER (OUTPATIENT)
Dept: PHYSICAL THERAPY | Facility: OTHER | Age: 67
Setting detail: THERAPIES SERIES
Discharge: HOME | End: 2022-11-08
Attending: INTERNAL MEDICINE
Payer: COMMERCIAL

## 2022-01-01 ENCOUNTER — HOSPITAL ENCOUNTER (OUTPATIENT)
Dept: PHYSICAL THERAPY | Facility: OTHER | Age: 67
Setting detail: THERAPIES SERIES
Discharge: HOME | End: 2022-10-27
Attending: INTERNAL MEDICINE
Payer: COMMERCIAL

## 2022-01-01 ENCOUNTER — ANTICOAGULATION THERAPY VISIT (OUTPATIENT)
Dept: ANTICOAGULATION | Facility: OTHER | Age: 67
End: 2022-01-01
Attending: INTERNAL MEDICINE
Payer: COMMERCIAL

## 2022-01-01 ENCOUNTER — HOSPITAL ENCOUNTER (OUTPATIENT)
Dept: PHYSICAL THERAPY | Facility: OTHER | Age: 67
Setting detail: THERAPIES SERIES
Discharge: HOME | End: 2022-12-06
Attending: INTERNAL MEDICINE
Payer: COMMERCIAL

## 2022-01-01 ENCOUNTER — HOSPITAL ENCOUNTER (OUTPATIENT)
Dept: PHYSICAL THERAPY | Facility: OTHER | Age: 67
Setting detail: THERAPIES SERIES
Discharge: HOME | End: 2022-11-01
Attending: INTERNAL MEDICINE
Payer: COMMERCIAL

## 2022-01-01 ENCOUNTER — HOSPITAL ENCOUNTER (OUTPATIENT)
Dept: PHYSICAL THERAPY | Facility: OTHER | Age: 67
Setting detail: THERAPIES SERIES
Discharge: HOME | End: 2022-10-11
Attending: INTERNAL MEDICINE
Payer: COMMERCIAL

## 2022-01-01 ENCOUNTER — HOSPITAL ENCOUNTER (OUTPATIENT)
Dept: PHYSICAL THERAPY | Facility: OTHER | Age: 67
Setting detail: THERAPIES SERIES
Discharge: HOME | End: 2022-11-03
Attending: INTERNAL MEDICINE
Payer: COMMERCIAL

## 2022-01-01 ENCOUNTER — HOSPITAL ENCOUNTER (OUTPATIENT)
Dept: PHYSICAL THERAPY | Facility: OTHER | Age: 67
Setting detail: THERAPIES SERIES
Discharge: HOME | End: 2022-10-20
Attending: INTERNAL MEDICINE
Payer: COMMERCIAL

## 2022-01-01 ENCOUNTER — HOSPITAL ENCOUNTER (OUTPATIENT)
Dept: PHYSICAL THERAPY | Facility: OTHER | Age: 67
Setting detail: THERAPIES SERIES
Discharge: HOME | End: 2022-10-04
Attending: INTERNAL MEDICINE
Payer: COMMERCIAL

## 2022-01-01 ENCOUNTER — HOSPITAL ENCOUNTER (OUTPATIENT)
Dept: PHYSICAL THERAPY | Facility: OTHER | Age: 67
Setting detail: THERAPIES SERIES
Discharge: HOME | End: 2022-11-10
Attending: INTERNAL MEDICINE
Payer: COMMERCIAL

## 2022-01-01 ENCOUNTER — HOSPITAL ENCOUNTER (OUTPATIENT)
Dept: PHYSICAL THERAPY | Facility: OTHER | Age: 67
Setting detail: THERAPIES SERIES
Discharge: HOME | End: 2022-10-25
Attending: INTERNAL MEDICINE
Payer: COMMERCIAL

## 2022-01-01 DIAGNOSIS — Z86.2 HISTORY OF ITP: ICD-10-CM

## 2022-01-01 DIAGNOSIS — Z86.73 HISTORY OF CEREBROVASCULAR ACCIDENT: ICD-10-CM

## 2022-01-01 DIAGNOSIS — Z79.01 ANTICOAGULATION MONITORING, INR RANGE 2-3: ICD-10-CM

## 2022-01-01 DIAGNOSIS — I25.2 HISTORY OF ST ELEVATION MYOCARDIAL INFARCTION (STEMI): ICD-10-CM

## 2022-01-01 DIAGNOSIS — I95.1 ORTHOSTATIC HYPOTENSION: Primary | ICD-10-CM

## 2022-01-01 DIAGNOSIS — Z86.74 HISTORY OF CARDIAC ARREST: ICD-10-CM

## 2022-01-01 DIAGNOSIS — I73.9 PAD (PERIPHERAL ARTERY DISEASE) (H): Primary | ICD-10-CM

## 2022-01-01 DIAGNOSIS — I25.10 ATHEROSCLEROSIS OF NATIVE CORONARY ARTERY OF NATIVE HEART WITHOUT ANGINA PECTORIS: ICD-10-CM

## 2022-01-01 DIAGNOSIS — I48.0 PAROXYSMAL ATRIAL FIBRILLATION (H): ICD-10-CM

## 2022-01-01 LAB
INR POINT OF CARE: 1.8 (ref 0.9–1.1)
INR POINT OF CARE: 1.9 (ref 0.9–1.1)
INR POINT OF CARE: 2.3 (ref 0.9–1.1)
INR POINT OF CARE: 2.7 (ref 0.9–1.1)
INR POINT OF CARE: 2.9 (ref 0.9–1.1)
INR POINT OF CARE: 3.3 (ref 0.9–1.1)
INR POINT OF CARE: 4.4 (ref 0.9–1.1)
INR POINT OF CARE: 5.4 (ref 0.9–1.1)

## 2022-01-01 PROCEDURE — 97116 GAIT TRAINING THERAPY: CPT | Mod: GP

## 2022-01-01 PROCEDURE — 36416 COLLJ CAPILLARY BLOOD SPEC: CPT | Mod: ZL

## 2022-01-01 PROCEDURE — 97110 THERAPEUTIC EXERCISES: CPT | Mod: GP

## 2022-01-01 PROCEDURE — 97116 GAIT TRAINING THERAPY: CPT | Mod: GP,PO

## 2022-01-01 PROCEDURE — 97110 THERAPEUTIC EXERCISES: CPT | Mod: GP,PO

## 2022-01-01 PROCEDURE — 85610 PROTHROMBIN TIME: CPT | Mod: ZL,QW

## 2022-01-01 RX ORDER — WARFARIN SODIUM 2 MG/1
TABLET ORAL
Qty: 180 TABLET | Refills: 1 | Status: SHIPPED | OUTPATIENT
Start: 2022-01-01

## 2022-01-01 RX ORDER — MIDODRINE HYDROCHLORIDE 10 MG/1
TABLET ORAL
Qty: 11 TABLET | Refills: 1 | Status: SHIPPED | OUTPATIENT
Start: 2022-01-01 | End: 2023-01-01

## 2022-01-02 ENCOUNTER — ALLIED HEALTH/NURSE VISIT (OUTPATIENT)
Dept: FAMILY MEDICINE | Facility: OTHER | Age: 67
End: 2022-01-02
Attending: FAMILY MEDICINE
Payer: COMMERCIAL

## 2022-01-02 DIAGNOSIS — Z20.822 COVID-19 RULED OUT: Primary | ICD-10-CM

## 2022-01-02 PROCEDURE — U0003 INFECTIOUS AGENT DETECTION BY NUCLEIC ACID (DNA OR RNA); SEVERE ACUTE RESPIRATORY SYNDROME CORONAVIRUS 2 (SARS-COV-2) (CORONAVIRUS DISEASE [COVID-19]), AMPLIFIED PROBE TECHNIQUE, MAKING USE OF HIGH THROUGHPUT TECHNOLOGIES AS DESCRIBED BY CMS-2020-01-R: HCPCS | Mod: ZL

## 2022-01-02 PROCEDURE — C9803 HOPD COVID-19 SPEC COLLECT: HCPCS

## 2022-01-02 NOTE — PROGRESS NOTES
Patient swabbed for COVID-19 testing.  Rule out  Cold symptoms runny nose  Fawn Junior MA on 1/2/2022 at 9:39 AM

## 2022-01-03 ENCOUNTER — ANTICOAGULATION THERAPY VISIT (OUTPATIENT)
Dept: ANTICOAGULATION | Facility: OTHER | Age: 67
End: 2022-01-03
Attending: INTERNAL MEDICINE
Payer: COMMERCIAL

## 2022-01-03 DIAGNOSIS — Z86.2 HISTORY OF ITP: ICD-10-CM

## 2022-01-03 DIAGNOSIS — Z86.73 HISTORY OF CEREBROVASCULAR ACCIDENT: ICD-10-CM

## 2022-01-03 DIAGNOSIS — Z86.74 HISTORY OF CARDIAC ARREST: ICD-10-CM

## 2022-01-03 DIAGNOSIS — I25.10 ATHEROSCLEROSIS OF NATIVE CORONARY ARTERY OF NATIVE HEART WITHOUT ANGINA PECTORIS: ICD-10-CM

## 2022-01-03 DIAGNOSIS — I73.9 PAD (PERIPHERAL ARTERY DISEASE) (H): Primary | ICD-10-CM

## 2022-01-03 DIAGNOSIS — I25.2 HISTORY OF ST ELEVATION MYOCARDIAL INFARCTION (STEMI): ICD-10-CM

## 2022-01-03 LAB — INR (EXTERNAL): 2.7 (ref 0.9–1.1)

## 2022-01-03 NOTE — PROGRESS NOTES
ANTICOAGULATION FOLLOW-UP CLINIC VISIT    Patient Name:  Miguel Parisi  Date:  1/3/2022  Contact Type:  Telephone/ spoke with Nanette mccarthy with [facility]    SUBJECTIVE:  Patient Findings         Clinical Outcomes     Negatives:  Major bleeding event, Thromboembolic event, Anticoagulation-related hospital admission, Anticoagulation-related ED visit, Anticoagulation-related fatality           OBJECTIVE    Recent labs: (last 7 days)     22  1332   INR 2.7*       ASSESSMENT / PLAN  INR assessment THER    Recheck INR In: 3 WEEKS    [facility]R Location [facility]      Anticoagulation Summary  As of 1/3/2022    INR goal:  2.0-3.0   TTR:  49.9 % (4.5 mo)   INR used for dosin.7 (1/3/2022)   Warfarin maintenance plan:  2 mg (2 mg x 1) every Mon; 4 mg (2 mg x 2) all other days   Full warfarin instructions:  2 mg every Mon; 4 mg all other days   Weekly warfarin total:  26 mg   No change documented:  Gissel Arechiga RN   Plan last modified:  Gissel Arechiga RN (2021)   Next INR check:  2022   Priority:  High   Target end date:  8/10/2027    Indications    PAD (peripheral artery disease) (H) [I73.9]  History of cerebrovascular accident [Z86.73]  History of ITP on 2021 [Z86.2]  H/O STEMI on 4/15/2021 [I25.2]  History of cardiac arrest [Z86.74]  Atherosclerosis of native coronary artery of native heart without angina pectoris [I25.10]             Anticoagulation Episode Summary     INR check location:      Preferred lab:      Send INR reminders to:  [facility]    Comments:        Anticoagulation Care Providers     Provider Role Specialty Phone number    Danii Ortiz NP Referring Internal Medicine [phone]            See the Encounter Report to view Anticoagulation Flowsheet and Dosing Calendar (Go to Encounters tab in chart review, and find the Anticoagulation Therapy Visit)        Gissel Arechiga RN

## 2022-01-04 LAB — SARS-COV-2 RNA RESP QL NAA+PROBE: NEGATIVE

## 2022-01-06 ENCOUNTER — HOSPITAL ENCOUNTER (OUTPATIENT)
Dept: CARDIOLOGY | Facility: OTHER | Age: 67
Discharge: HOME | End: 2022-01-06
Attending: INTERNAL MEDICINE | Admitting: INTERNAL MEDICINE
Payer: COMMERCIAL

## 2022-01-06 DIAGNOSIS — I48.0 PAROXYSMAL ATRIAL FIBRILLATION (H): ICD-10-CM

## 2022-01-06 DIAGNOSIS — I50.42 CHRONIC COMBINED SYSTOLIC AND DIASTOLIC CONGESTIVE HEART FAILURE (H): ICD-10-CM

## 2022-01-06 DIAGNOSIS — I50.42 CHRONIC COMBINED SYSTOLIC (CONGESTIVE) AND DIASTOLIC (CONGESTIVE) HEART FAILURE (H): ICD-10-CM

## 2022-01-06 DIAGNOSIS — I10 BENIGN ESSENTIAL HYPERTENSION: ICD-10-CM

## 2022-01-06 LAB
BI-PLANE LVEF ECHO: NORMAL
LVEF ECHO: NORMAL

## 2022-01-06 PROCEDURE — 93306 TTE W/DOPPLER COMPLETE: CPT | Mod: 26 | Performed by: STUDENT IN AN ORGANIZED HEALTH CARE EDUCATION/TRAINING PROGRAM

## 2022-01-06 PROCEDURE — 93306 TTE W/DOPPLER COMPLETE: CPT
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
## 2022-01-11 ENCOUNTER — OFFICE VISIT (OUTPATIENT)
Dept: CARDIOLOGY | Facility: OTHER | Age: 67
End: 2022-01-11
Attending: INTERNAL MEDICINE
Payer: COMMERCIAL

## 2022-01-11 VITALS
BODY MASS INDEX: 29.4 KG/M2 | DIASTOLIC BLOOD PRESSURE: 82 MMHG | WEIGHT: 210 LBS | RESPIRATION RATE: 16 BRPM | TEMPERATURE: 96.8 F | HEIGHT: 71 IN | OXYGEN SATURATION: 100 % | SYSTOLIC BLOOD PRESSURE: 128 MMHG | HEART RATE: 68 BPM

## 2022-01-11 DIAGNOSIS — I25.10 ATHEROSCLEROSIS OF NATIVE CORONARY ARTERY OF NATIVE HEART WITHOUT ANGINA PECTORIS: ICD-10-CM

## 2022-01-11 DIAGNOSIS — Z79.4 TYPE 2 DIABETES MELLITUS WITH HYPERGLYCEMIA, WITH LONG-TERM CURRENT USE OF INSULIN (H): ICD-10-CM

## 2022-01-11 DIAGNOSIS — E78.2 MIXED HYPERLIPIDEMIA: ICD-10-CM

## 2022-01-11 DIAGNOSIS — I69.398 IMPAIRED BALANCE AS LATE EFFECT OF CEREBROVASCULAR ACCIDENT (CVA): ICD-10-CM

## 2022-01-11 DIAGNOSIS — N18.6 ESRD (END STAGE RENAL DISEASE) ON DIALYSIS (H): ICD-10-CM

## 2022-01-11 DIAGNOSIS — I65.23 BILATERAL CAROTID ARTERY STENOSIS: ICD-10-CM

## 2022-01-11 DIAGNOSIS — I73.9 PAD (PERIPHERAL ARTERY DISEASE) (H): ICD-10-CM

## 2022-01-11 DIAGNOSIS — I48.91 ON COUMADIN FOR ATRIAL FIBRILLATION (H): ICD-10-CM

## 2022-01-11 DIAGNOSIS — E83.39 HYPERPHOSPHATEMIA: ICD-10-CM

## 2022-01-11 DIAGNOSIS — E78.1 HYPERTRIGLYCERIDEMIA: ICD-10-CM

## 2022-01-11 DIAGNOSIS — Z87.891 HISTORY OF TOBACCO ABUSE: ICD-10-CM

## 2022-01-11 DIAGNOSIS — R26.89 IMPAIRED BALANCE AS LATE EFFECT OF CEREBROVASCULAR ACCIDENT (CVA): ICD-10-CM

## 2022-01-11 DIAGNOSIS — R60.0 PERIPHERAL EDEMA: ICD-10-CM

## 2022-01-11 DIAGNOSIS — Z79.01 ON COUMADIN FOR ATRIAL FIBRILLATION (H): ICD-10-CM

## 2022-01-11 DIAGNOSIS — E66.01 MORBID OBESITY (H): ICD-10-CM

## 2022-01-11 DIAGNOSIS — R20.2 NUMBNESS AND TINGLING IN LEFT HAND: ICD-10-CM

## 2022-01-11 DIAGNOSIS — Z86.74 HISTORY OF SUDDEN CARDIAC ARREST SUCCESSFULLY RESUSCITATED: ICD-10-CM

## 2022-01-11 DIAGNOSIS — R20.0 LEFT FACIAL NUMBNESS: ICD-10-CM

## 2022-01-11 DIAGNOSIS — I27.20 MILD PULMONARY HYPERTENSION (H): ICD-10-CM

## 2022-01-11 DIAGNOSIS — I50.42 CHRONIC COMBINED SYSTOLIC AND DIASTOLIC CONGESTIVE HEART FAILURE (H): ICD-10-CM

## 2022-01-11 DIAGNOSIS — I25.5 GENERALIZED ISCHEMIC MYOCARDIAL DYSFUNCTION: ICD-10-CM

## 2022-01-11 DIAGNOSIS — I25.2 HISTORY OF ST ELEVATION MYOCARDIAL INFARCTION (STEMI): ICD-10-CM

## 2022-01-11 DIAGNOSIS — Z99.2 ESRD (END STAGE RENAL DISEASE) ON DIALYSIS (H): ICD-10-CM

## 2022-01-11 DIAGNOSIS — U07.1 CLINICAL DIAGNOSIS OF COVID-19: ICD-10-CM

## 2022-01-11 DIAGNOSIS — I25.2 H/O NON-ST ELEVATION MYOCARDIAL INFARCTION (NSTEMI): ICD-10-CM

## 2022-01-11 DIAGNOSIS — R20.0 NUMBNESS AND TINGLING IN LEFT HAND: ICD-10-CM

## 2022-01-11 DIAGNOSIS — Z86.74 HISTORY OF CARDIAC ARREST: ICD-10-CM

## 2022-01-11 DIAGNOSIS — I10 BENIGN ESSENTIAL HYPERTENSION: ICD-10-CM

## 2022-01-11 DIAGNOSIS — I50.42 CHRONIC COMBINED SYSTOLIC (CONGESTIVE) AND DIASTOLIC (CONGESTIVE) HEART FAILURE (H): ICD-10-CM

## 2022-01-11 DIAGNOSIS — Z86.73 HISTORY OF CEREBROVASCULAR ACCIDENT: ICD-10-CM

## 2022-01-11 DIAGNOSIS — I48.19 PERSISTENT ATRIAL FIBRILLATION (H): ICD-10-CM

## 2022-01-11 DIAGNOSIS — Z86.2 HISTORY OF ITP: ICD-10-CM

## 2022-01-11 DIAGNOSIS — E11.65 TYPE 2 DIABETES MELLITUS WITH HYPERGLYCEMIA, WITH LONG-TERM CURRENT USE OF INSULIN (H): ICD-10-CM

## 2022-01-11 DIAGNOSIS — Z71.6 TOBACCO ABUSE COUNSELING: ICD-10-CM

## 2022-01-11 DIAGNOSIS — Z95.1 HISTORY OF CORONARY ARTERY BYPASS SURGERY: Primary | ICD-10-CM

## 2022-01-11 DIAGNOSIS — J43.8 OTHER EMPHYSEMA (H): ICD-10-CM

## 2022-01-11 DIAGNOSIS — G47.33 OSA (OBSTRUCTIVE SLEEP APNEA): ICD-10-CM

## 2022-01-11 DIAGNOSIS — I25.10 ATHEROSCLEROTIC CARDIOVASCULAR DISEASE: ICD-10-CM

## 2022-01-11 PROBLEM — D62 ANEMIA DUE TO ACUTE BLOOD LOSS: Status: RESOLVED | Noted: 2021-07-25 | Resolved: 2022-01-11

## 2022-01-11 PROCEDURE — 99215 OFFICE O/P EST HI 40 MIN: CPT | Performed by: INTERNAL MEDICINE

## 2022-01-11 PROCEDURE — G0463 HOSPITAL OUTPT CLINIC VISIT: HCPCS

## 2022-01-11 RX ORDER — FUROSEMIDE 80 MG
80 TABLET ORAL 2 TIMES DAILY
Qty: 90 TABLET | Refills: 3 | Status: SHIPPED | OUTPATIENT
Start: 2022-01-11 | End: 2023-01-01

## 2022-01-11 RX ORDER — CARVEDILOL 12.5 MG/1
12.5 TABLET ORAL 2 TIMES DAILY WITH MEALS
Qty: 180 TABLET | Refills: 3 | Status: SHIPPED | OUTPATIENT
Start: 2022-01-11 | End: 2023-01-01

## 2022-01-11 ASSESSMENT — MIFFLIN-ST. JEOR: SCORE: 1744.42

## 2022-01-11 ASSESSMENT — PAIN SCALES - GENERAL: PAINLEVEL: NO PAIN (0)

## 2022-01-11 NOTE — NURSING NOTE
Patient presents to clinic today for follow up.   Medication reconciliation completed.    ACP on file? No, form provided.    FOOD SECURITY SCREENING QUESTIONS    The next two questions are to help us understand your food security.  If you are feeling you need any assistance in this area, we have resources available to support you today.    Hunger Vital Signs:  Within the past 12 months we worried whether our food would run out before we got money to buy more. Never  Within the past 12 months the food we bought just didn't last and we didn't have money to get more. Never    Negin Turcios CMA(AAMA)..................1/11/2022   11:29 AM

## 2022-01-25 ENCOUNTER — ANTICOAGULATION THERAPY VISIT (OUTPATIENT)
Dept: ANTICOAGULATION | Facility: OTHER | Age: 67
End: 2022-01-25
Attending: INTERNAL MEDICINE
Payer: COMMERCIAL

## 2022-01-25 DIAGNOSIS — I25.10 ATHEROSCLEROSIS OF NATIVE CORONARY ARTERY OF NATIVE HEART WITHOUT ANGINA PECTORIS: ICD-10-CM

## 2022-01-25 DIAGNOSIS — Z86.2 HISTORY OF ITP: ICD-10-CM

## 2022-01-25 DIAGNOSIS — Z86.74 HISTORY OF CARDIAC ARREST: ICD-10-CM

## 2022-01-25 DIAGNOSIS — I25.2 HISTORY OF ST ELEVATION MYOCARDIAL INFARCTION (STEMI): ICD-10-CM

## 2022-01-25 DIAGNOSIS — I73.9 PAD (PERIPHERAL ARTERY DISEASE) (H): Primary | ICD-10-CM

## 2022-01-25 DIAGNOSIS — Z86.73 HISTORY OF CEREBROVASCULAR ACCIDENT: ICD-10-CM

## 2022-01-25 LAB — INR (EXTERNAL): 2.3 (ref 0.9–1.1)

## 2022-01-25 NOTE — PROGRESS NOTES
ANTICOAGULATION FOLLOW-UP CLINIC VISIT    Patient Name:  Miguel Parisi  Date:  2022  Contact Type:  Telephone call from Nan[facility] at [facility]    SUBJECTIVE:  Patient Findings         Clinical Outcomes     Negatives:  Major bleeding event, Thromboembolic event, Anticoagulation-related hospital admission, Anticoagulation-related ED visit, Anticoagulation-related fatality           OBJECTIVE    Recent labs: (last 7 days)     22  1310   INR 2.3*       ASSESSMENT / PLAN  INR assessment THER    Recheck INR In: 4 WEEKS    INR Location [facility]      Anticoagulation Summary  As of 2022    INR goal:  2.0-3.0   TTR:  56.8 % (5.3 mo)   INR used for dosin.3 (2022)   Warfarin maintenance plan:  2 mg (2 mg x 1) every Mon; 4 mg (2 mg x 2) all other days   Full warfarin instructions:  2 mg every Mon; 4 mg all other days   Weekly warfarin total:  26 mg   Plan last modified:  Gissel Arechiga RN (2021)   Next INR check:  2022   Priority:  High   Target end date:  8/10/2027    Indications    PAD (peripheral artery disease) (H) [I73.9]  History of cerebrovascular accident on 2020 [Z86.73]  History of ITP on 2021 [Z86.2]  H/O STEMI on 4/15/2021 [I25.2]  History of cardiac arrest [Z86.74]  Atherosclerosis of native coronary artery of native heart without angina pectoris [I25.10]             Anticoagulation Episode Summary     INR check location:      Preferred lab:      Send INR reminders to:  [facility]    Comments:        Anticoagulation Care Providers     Provider Role Specialty Phone number    Danii Ortiz NP Referring Internal Medicine [phone]            See the Encounter Report to view Anticoagulation Flowsheet and Dosing Calendar (Go to Encounters tab in chart review, and find the Anticoagulation Therapy Visit)        Abby Scherer RN

## 2022-01-29 ENCOUNTER — HEALTH MAINTENANCE LETTER (OUTPATIENT)
Age: 67
End: 2022-01-29

## 2022-02-08 ENCOUNTER — PATIENT OUTREACH (OUTPATIENT)
Dept: CARE COORDINATION | Facility: CLINIC | Age: 67
End: 2022-02-08
Payer: MEDICARE

## 2022-02-08 NOTE — PROGRESS NOTES
"Clinic Care Coordination Heart Failure Follow Up Assessment:  Verified patient's name/.        Pt is not enrolled in HF program, is still interested in enrolling. Tentative enrollment (Face to face) at end of February when he comes in for INR.   Pt shares he is no longer getting [facility] services, is hoping to come in  or Thursday to align with dialysis days.   Dialysis is scheduled for Tuesday, Thursday, Saturday. 4 hour runs.   Pt shares he often feels good the next day. Day of dialysis, he is tired.   Denies SOB, CP. Pt states the nurse at dialysis told him on Saturday the swelling in his legs are \"going down\". States he has sores on left leg, had Dr Wallace look at them at his visit. Sores are smaller than a dime, scabbed over. No drainage. No redness. Wife is helping with putting lotion on legs and feet. Sores on top of toes, reportedly scabbed over. Denies pain with shoes.  Plans to Epsom salt soak. Reviewed med changes at LOV with Dr Wallace.     Pt talked about wanting to see if he can stop dialysis since he has \"good urine output.\" Advised pt to discuss with his provider regarding this concern. Pt plans to discuss with nephrology provider. Pt goes to [facility].       Last contacted date: 2021    Home scale available: pt states he does not weight daily at home, everyother day at dialysis    Home scale weight this morning: after dialysis: 93.5 kilogram, took off 3.9    Following a low sodium diet: Yes        Heart Failure Zones sheet on refrigerator or available: No    What Heart Failure Zone currently in: No Zone Color Given    Any increased SOB since last contacted: No    Any increased edema since last contacted: No and reports improving.    Is your activity more limited since last contact: No    Have you had any chest pain since last contact: No    What number to call for YELLOW zones: [phone]       Medications:   o \"How many of your current medicines changed " "(dose, timing, name, etc.) since last contacted ?\" per 1/25/2022 OV, pt repeated correctly.    o \"Do you have questions about your medications?\"No    When is your next appointment with the CHF clinic: none on file with Dr. Wallace.     When is the appointment with PCP: none on file    Care Coordinator following: SARA Saunders. Enrollment planned for the end of February when into the clinic for INR. Pt will call for appointment with [facility].   Plan: CC will meet with patient to enroll in HF CC when he comes in for INR at the end of February. Will remain available.   Pt denies further concerns at this time.   Nicky Del Angel RN ,....................  2/8/2022   4:33 PM    "

## 2022-02-22 ENCOUNTER — HOSPITAL ENCOUNTER (EMERGENCY)
Facility: OTHER | Age: 67
Discharge: ANOTHER HOSPITAL | End: 2022-02-22
Attending: STUDENT IN AN ORGANIZED HEALTH CARE EDUCATION/TRAINING PROGRAM | Admitting: STUDENT IN AN ORGANIZED HEALTH CARE EDUCATION/TRAINING PROGRAM
Payer: COMMERCIAL

## 2022-02-22 VITALS
OXYGEN SATURATION: 96 % | DIASTOLIC BLOOD PRESSURE: 68 MMHG | RESPIRATION RATE: 18 BRPM | HEART RATE: 100 BPM | HEIGHT: 70 IN | BODY MASS INDEX: 29.52 KG/M2 | TEMPERATURE: 97.8 F | SYSTOLIC BLOOD PRESSURE: 103 MMHG | WEIGHT: 206.2 LBS

## 2022-02-22 DIAGNOSIS — A41.9 SEPSIS, DUE TO UNSPECIFIED ORGANISM, UNSPECIFIED WHETHER ACUTE ORGAN DYSFUNCTION PRESENT (H): ICD-10-CM

## 2022-02-22 DIAGNOSIS — L03.116 CELLULITIS OF FOOT, LEFT: ICD-10-CM

## 2022-02-22 LAB
ALBUMIN SERPL-MCNC: 4.4 G/DL (ref 3.5–5.7)
ALP SERPL-CCNC: 45 U/L (ref 34–104)
ALT SERPL W P-5'-P-CCNC: 7 U/L (ref 7–52)
ANION GAP SERPL CALCULATED.3IONS-SCNC: 16 MMOL/L (ref 3–14)
AST SERPL W P-5'-P-CCNC: 10 U/L (ref 13–39)
BASOPHILS # BLD AUTO: 0 10E3/UL (ref 0–0.2)
BASOPHILS NFR BLD AUTO: 0 %
BILIRUB SERPL-MCNC: 0.7 MG/DL (ref 0.3–1)
BUN SERPL-MCNC: 75 MG/DL (ref 7–25)
CALCIUM SERPL-MCNC: 9.8 MG/DL (ref 8.6–10.3)
CHLORIDE BLD-SCNC: 93 MMOL/L (ref 98–107)
CO2 SERPL-SCNC: 23 MMOL/L (ref 21–31)
CREAT SERPL-MCNC: 7.01 MG/DL (ref 0.7–1.3)
EOSINOPHIL # BLD AUTO: 0 10E3/UL (ref 0–0.7)
EOSINOPHIL NFR BLD AUTO: 0 %
ERYTHROCYTE [DISTWIDTH] IN BLOOD BY AUTOMATED COUNT: 13.6 % (ref 10–15)
FLUAV RNA SPEC QL NAA+PROBE: NEGATIVE
FLUBV RNA RESP QL NAA+PROBE: NEGATIVE
GFR SERPL CREATININE-BSD FRML MDRD: 8 ML/MIN/1.73M2
GLUCOSE BLD-MCNC: 294 MG/DL (ref 70–105)
HCT VFR BLD AUTO: 35.4 % (ref 40–53)
HGB BLD-MCNC: 11.5 G/DL (ref 13.3–17.7)
IMM GRANULOCYTES # BLD: 0.2 10E3/UL
IMM GRANULOCYTES NFR BLD: 1 %
INR PPP: 2.21 (ref 0.85–1.15)
LACTATE SERPL-SCNC: 4.5 MMOL/L (ref 0.7–2)
LYMPHOCYTES # BLD AUTO: 0.4 10E3/UL (ref 0.8–5.3)
LYMPHOCYTES NFR BLD AUTO: 2 %
MAGNESIUM SERPL-MCNC: 1.8 MG/DL (ref 1.9–2.7)
MCH RBC QN AUTO: 31.4 PG (ref 26.5–33)
MCHC RBC AUTO-ENTMCNC: 32.5 G/DL (ref 31.5–36.5)
MCV RBC AUTO: 97 FL (ref 78–100)
MONOCYTES # BLD AUTO: 0.8 10E3/UL (ref 0–1.3)
MONOCYTES NFR BLD AUTO: 3 %
NEUTROPHILS # BLD AUTO: 23.2 10E3/UL (ref 1.6–8.3)
NEUTROPHILS NFR BLD AUTO: 94 %
NRBC # BLD AUTO: 0 10E3/UL
NRBC BLD AUTO-RTO: 0 /100
NT-PROBNP SERPL-MCNC: 4776 PG/ML (ref 0–100)
PLATELET # BLD AUTO: 116 10E3/UL (ref 150–450)
POTASSIUM BLD-SCNC: 4.1 MMOL/L (ref 3.5–5.1)
PROT SERPL-MCNC: 7.4 G/DL (ref 6.4–8.9)
RBC # BLD AUTO: 3.66 10E6/UL (ref 4.4–5.9)
RSV RNA SPEC NAA+PROBE: NEGATIVE
SARS-COV-2 RNA RESP QL NAA+PROBE: NEGATIVE
SODIUM SERPL-SCNC: 132 MMOL/L (ref 134–144)
WBC # BLD AUTO: 24.6 10E3/UL (ref 4–11)

## 2022-02-22 PROCEDURE — 96365 THER/PROPH/DIAG IV INF INIT: CPT | Performed by: STUDENT IN AN ORGANIZED HEALTH CARE EDUCATION/TRAINING PROGRAM

## 2022-02-22 PROCEDURE — 96375 TX/PRO/DX INJ NEW DRUG ADDON: CPT | Performed by: STUDENT IN AN ORGANIZED HEALTH CARE EDUCATION/TRAINING PROGRAM

## 2022-02-22 PROCEDURE — 99285 EMERGENCY DEPT VISIT HI MDM: CPT | Mod: 25 | Performed by: STUDENT IN AN ORGANIZED HEALTH CARE EDUCATION/TRAINING PROGRAM

## 2022-02-22 PROCEDURE — 87040 BLOOD CULTURE FOR BACTERIA: CPT | Performed by: STUDENT IN AN ORGANIZED HEALTH CARE EDUCATION/TRAINING PROGRAM

## 2022-02-22 PROCEDURE — 83880 ASSAY OF NATRIURETIC PEPTIDE: CPT | Performed by: STUDENT IN AN ORGANIZED HEALTH CARE EDUCATION/TRAINING PROGRAM

## 2022-02-22 PROCEDURE — 85025 COMPLETE CBC W/AUTO DIFF WBC: CPT | Performed by: STUDENT IN AN ORGANIZED HEALTH CARE EDUCATION/TRAINING PROGRAM

## 2022-02-22 PROCEDURE — 83735 ASSAY OF MAGNESIUM: CPT | Performed by: STUDENT IN AN ORGANIZED HEALTH CARE EDUCATION/TRAINING PROGRAM

## 2022-02-22 PROCEDURE — C9803 HOPD COVID-19 SPEC COLLECT: HCPCS | Performed by: STUDENT IN AN ORGANIZED HEALTH CARE EDUCATION/TRAINING PROGRAM

## 2022-02-22 PROCEDURE — 258N000003 HC RX IP 258 OP 636: Performed by: STUDENT IN AN ORGANIZED HEALTH CARE EDUCATION/TRAINING PROGRAM

## 2022-02-22 PROCEDURE — 96361 HYDRATE IV INFUSION ADD-ON: CPT | Performed by: STUDENT IN AN ORGANIZED HEALTH CARE EDUCATION/TRAINING PROGRAM

## 2022-02-22 PROCEDURE — 250N000011 HC RX IP 250 OP 636: Performed by: STUDENT IN AN ORGANIZED HEALTH CARE EDUCATION/TRAINING PROGRAM

## 2022-02-22 PROCEDURE — 99285 EMERGENCY DEPT VISIT HI MDM: CPT | Performed by: STUDENT IN AN ORGANIZED HEALTH CARE EDUCATION/TRAINING PROGRAM

## 2022-02-22 PROCEDURE — 85610 PROTHROMBIN TIME: CPT | Performed by: STUDENT IN AN ORGANIZED HEALTH CARE EDUCATION/TRAINING PROGRAM

## 2022-02-22 PROCEDURE — 87637 SARSCOV2&INF A&B&RSV AMP PRB: CPT | Performed by: STUDENT IN AN ORGANIZED HEALTH CARE EDUCATION/TRAINING PROGRAM

## 2022-02-22 PROCEDURE — 80053 COMPREHEN METABOLIC PANEL: CPT | Performed by: STUDENT IN AN ORGANIZED HEALTH CARE EDUCATION/TRAINING PROGRAM

## 2022-02-22 PROCEDURE — 83605 ASSAY OF LACTIC ACID: CPT | Performed by: STUDENT IN AN ORGANIZED HEALTH CARE EDUCATION/TRAINING PROGRAM

## 2022-02-22 PROCEDURE — 36415 COLL VENOUS BLD VENIPUNCTURE: CPT | Performed by: STUDENT IN AN ORGANIZED HEALTH CARE EDUCATION/TRAINING PROGRAM

## 2022-02-22 RX ORDER — PIPERACILLIN SODIUM, TAZOBACTAM SODIUM 2; .25 G/10ML; G/10ML
2.25 INJECTION, POWDER, LYOPHILIZED, FOR SOLUTION INTRAVENOUS ONCE
Status: COMPLETED | OUTPATIENT
Start: 2022-02-22 | End: 2022-02-22

## 2022-02-22 RX ORDER — PIPERACILLIN SODIUM, TAZOBACTAM SODIUM 4; .5 G/20ML; G/20ML
4.5 INJECTION, POWDER, LYOPHILIZED, FOR SOLUTION INTRAVENOUS ONCE
Status: DISCONTINUED | OUTPATIENT
Start: 2022-02-22 | End: 2022-02-22

## 2022-02-22 RX ORDER — SODIUM CHLORIDE, SODIUM LACTATE, POTASSIUM CHLORIDE, CALCIUM CHLORIDE 600; 310; 30; 20 MG/100ML; MG/100ML; MG/100ML; MG/100ML
INJECTION, SOLUTION INTRAVENOUS CONTINUOUS
Status: DISCONTINUED | OUTPATIENT
Start: 2022-02-22 | End: 2022-02-23 | Stop reason: HOSPADM

## 2022-02-22 RX ORDER — HYDROMORPHONE HYDROCHLORIDE 1 MG/ML
0.5 INJECTION, SOLUTION INTRAMUSCULAR; INTRAVENOUS; SUBCUTANEOUS ONCE
Status: DISCONTINUED | OUTPATIENT
Start: 2022-02-22 | End: 2022-02-23 | Stop reason: HOSPADM

## 2022-02-22 RX ORDER — CEFAZOLIN SODIUM 1 G/50ML
2000 SOLUTION INTRAVENOUS ONCE
Status: COMPLETED | OUTPATIENT
Start: 2022-02-22 | End: 2022-02-22

## 2022-02-22 RX ORDER — ONDANSETRON 2 MG/ML
4 INJECTION INTRAMUSCULAR; INTRAVENOUS ONCE
Status: DISCONTINUED | OUTPATIENT
Start: 2022-02-22 | End: 2022-02-23 | Stop reason: HOSPADM

## 2022-02-22 RX ADMIN — SODIUM CHLORIDE, POTASSIUM CHLORIDE, SODIUM LACTATE AND CALCIUM CHLORIDE 500 ML: 600; 310; 30; 20 INJECTION, SOLUTION INTRAVENOUS at 22:21

## 2022-02-22 RX ADMIN — PIPERACILLIN AND TAZOBACTAM 2.25 G: 2; .25 INJECTION, POWDER, LYOPHILIZED, FOR SOLUTION INTRAVENOUS at 23:00

## 2022-02-22 RX ADMIN — VANCOMYCIN HYDROCHLORIDE 2000 MG: 10 INJECTION, POWDER, LYOPHILIZED, FOR SOLUTION INTRAVENOUS at 23:41

## 2022-02-23 ENCOUNTER — PATIENT OUTREACH (OUTPATIENT)
Dept: CARE COORDINATION | Facility: CLINIC | Age: 67
End: 2022-02-23
Payer: MEDICARE

## 2022-02-23 ENCOUNTER — TRANSFERRED RECORDS (OUTPATIENT)
Dept: HEALTH INFORMATION MANAGEMENT | Facility: OTHER | Age: 67
End: 2022-02-23
Payer: MEDICARE

## 2022-02-23 NOTE — ED TRIAGE NOTES
EMS Arrival Note  ________________________________  Miguel Parisi is a 66 year old Male that arrives via [facility] ambulance service from home  Pre hospital clinical presentation per EMS personnel includes Pt reported not feeing well for the past few days. Redness/wounds, swelling, and warmth and noted to LLE.  Pre hospital personnel report vital signs of:; , RR 20;SpO2 95 T 102*  Patient arrives with:  GCS Total = 15  Airway intact  Breathing Assessment Normal  Circulation Assessment Normal    Placed in room 901, gowned, warm blanket provided, side rails up,  ID verified and band placed, and call light within reach.       Previous living situation Spouse

## 2022-02-23 NOTE — ED NOTES
Pt reports he did not hit his head when he fell this am around 1000. Pt lives with daughter and spouse. Writer talked with daughter and daughter denies pt hitting head. Daughter said he fell forward.  Writer will update MD.    Updated MD.    No new orders

## 2022-02-23 NOTE — PROGRESS NOTES
Clinic Care Coordination Contact    Situation: Patient chart reviewed by care coordinator.    Background:   Pending HF CC enrollment   Plan was to meet with patient and enroll this week upon INR check     Assessment:   ADT notification. Pt presented to ED 2/22/22 via EMS.   Pt transferred to [facility] for cellulitis, foot and for dialysis.     Plan/Recommendations:    CC continue to follow for HF enrollement.   Nicky Del Angel RN ,....................  2/23/2022   9:18 AM

## 2022-02-23 NOTE — ED NOTES
DATE:  2/22/2022   TIME OF RECEIPT FROM LAB:  2049  LAB TEST:  lactic  LAB VALUE:  4.5  RESULTS GIVEN WITH READ-BACK TO (PROVIDER):  Data Unavailable [place]  TIME LAB VALUE REPORTED TO PROVIDER:   2049

## 2022-02-25 ENCOUNTER — TRANSFERRED RECORDS (OUTPATIENT)
Dept: HEALTH INFORMATION MANAGEMENT | Facility: OTHER | Age: 67
End: 2022-02-25
Payer: MEDICARE

## 2022-02-27 LAB
BACTERIA BLD CULT: NO GROWTH
BACTERIA BLD CULT: NO GROWTH

## 2022-03-17 ENCOUNTER — TRANSFERRED RECORDS (OUTPATIENT)
Dept: HEALTH INFORMATION MANAGEMENT | Facility: OTHER | Age: 67
End: 2022-03-17
Payer: MEDICARE

## 2022-03-17 LAB — EJECTION FRACTION: NORMAL %

## 2022-03-22 ENCOUNTER — PATIENT OUTREACH (OUTPATIENT)
Dept: CARE COORDINATION | Facility: CLINIC | Age: 67
End: 2022-03-22
Payer: MEDICARE

## 2022-03-22 NOTE — PROGRESS NOTES
"Clinic Care Coordination Contact  [facility]/Voicemail  Clinical Data: Care Coordinator Outreach  Outreach attempted x 1. Unable to leave message on mobile phone.   Plan: Care Coordinator will try to reach patient again in.  Nicky Del Angel RN ,....................  3/22/2022   12:08 PM        Clinic Care Coordination Contact    Call to home phone. Spoke with wife Rosalind, Consent on file to communicate.  Miguel is still in [place] at [facility]. \"He had to have a leg amputated below the knee. It had gangrene. And his heart was having issues.\"   He has been fitted for a leg but not sure when he will get that.  Not sure when he will be discharged. He is doing better now but a couple weeks ago we thought we were going to loose him, they said to come see him.\"     Noted records available to view from [facility]: septic and cardiogenic shock.     Offered resources/support for Rosalind, she is staying home mostly. She doesn't think she needs anything right now but will call if something does come up and needing help.     CC will call in a few weeks/month to follow up. Rosalind welcomes the follow up call.   Nicky Del Angel RN ,....................  3/22/2022   12:14 PM     "

## 2022-04-20 ENCOUNTER — TELEPHONE (OUTPATIENT)
Dept: INTERNAL MEDICINE | Facility: OTHER | Age: 67
End: 2022-04-20
Payer: MEDICARE

## 2022-04-20 NOTE — TELEPHONE ENCOUNTER
Pharmacisit from [facility] called to follow up when last INR was, when home care last reported INR, and what company he had for home care. Coordination of care provided and necessary info given for care of patient. Radha Huggins RN on 4/20/2022 at 11:25 AM

## 2022-04-21 ENCOUNTER — TRANSFERRED RECORDS (OUTPATIENT)
Dept: HEALTH INFORMATION MANAGEMENT | Facility: OTHER | Age: 67
End: 2022-04-21
Payer: MEDICARE

## 2022-04-21 NOTE — TELEPHONE ENCOUNTER
PT/OT/Home Care order ok relayed.  Negin Turcios CMA([facility])..................4/21/2022   1:30 PM

## 2022-04-23 ENCOUNTER — TELEPHONE (OUTPATIENT)
Dept: INTERNAL MEDICINE | Facility: OTHER | Age: 67
End: 2022-04-23
Payer: MEDICARE

## 2022-04-23 DIAGNOSIS — Z47.81 ENCOUNTER FOR ORTHOPEDIC AFTERCARE FOLLOWING SURGICAL AMPUTATION: Primary | ICD-10-CM

## 2022-04-23 PROCEDURE — G0180 MD CERTIFICATION HHA PATIENT: HCPCS | Performed by: INTERNAL MEDICINE

## 2022-04-25 ENCOUNTER — TELEPHONE (OUTPATIENT)
Dept: INTERNAL MEDICINE | Facility: OTHER | Age: 67
End: 2022-04-25
Payer: MEDICARE

## 2022-04-25 NOTE — TELEPHONE ENCOUNTER
Count includes the [facility] requesting a verbal order for 2 times a week scheduled ntrse visit.    Elham Kohler on 4/25/2022 at 9:20 AM

## 2022-04-25 NOTE — TELEPHONE ENCOUNTER
PT/OT/Home Care order ok relayed.  Negin Turcios CMA([facility])..................4/25/2022   12:06 PM

## 2022-04-26 ENCOUNTER — TELEPHONE (OUTPATIENT)
Dept: INTERNAL MEDICINE | Facility: OTHER | Age: 67
End: 2022-04-26
Payer: MEDICARE

## 2022-04-26 ENCOUNTER — TRANSFERRED RECORDS (OUTPATIENT)
Dept: HEALTH INFORMATION MANAGEMENT | Facility: OTHER | Age: 67
End: 2022-04-26
Payer: MEDICARE

## 2022-04-26 NOTE — TELEPHONE ENCOUNTER
Looking for  Verbal orders for   2x week OT for 2 weeks   1x week for 4 weeks for training in transfers DME home excercise program and self care.     Negin Leigh on 4/26/2022 at 10:34 AM

## 2022-04-27 ENCOUNTER — ANTICOAGULATION THERAPY VISIT (OUTPATIENT)
Dept: ANTICOAGULATION | Facility: OTHER | Age: 67
End: 2022-04-27
Attending: INTERNAL MEDICINE
Payer: MEDICARE

## 2022-04-27 DIAGNOSIS — Z86.74 HISTORY OF CARDIAC ARREST: ICD-10-CM

## 2022-04-27 DIAGNOSIS — I73.9 PAD (PERIPHERAL ARTERY DISEASE) (H): Primary | ICD-10-CM

## 2022-04-27 DIAGNOSIS — Z86.2 HISTORY OF ITP: ICD-10-CM

## 2022-04-27 DIAGNOSIS — Z86.73 HISTORY OF CEREBROVASCULAR ACCIDENT: ICD-10-CM

## 2022-04-27 DIAGNOSIS — I25.10 ATHEROSCLEROSIS OF NATIVE CORONARY ARTERY OF NATIVE HEART WITHOUT ANGINA PECTORIS: ICD-10-CM

## 2022-04-27 DIAGNOSIS — I25.2 HISTORY OF ST ELEVATION MYOCARDIAL INFARCTION (STEMI): ICD-10-CM

## 2022-04-27 LAB — INR (EXTERNAL): 3 (ref 2–3)

## 2022-04-27 NOTE — PROGRESS NOTES
ANTICOAGULATION FOLLOW-UP CLINIC VISIT    Patient Name:  Miguel Parisi  Date:  4/27/2022  Contact Type:  Telephone/ Spoke with SARA Fitzpatrick at [facility]. Gave her dosing instructions and recheck on Monday. Faxed AVS to [facility].    SUBJECTIVE:  Patient Findings     Positives:  Change in medications, Hospital admission    Comments:  Patient was discharged from [facility] on 4/22/22 after left below the knee amputation. At discharge patients warfarin was changed to 2mg daily which he has been taking as prescribed. He is now re-admitted to home care with [facility] who goes to his home on Mondays and Fridays.         Clinical Outcomes     Negatives:  Major bleeding event, Thromboembolic event, Anticoagulation-related hospital admission, Anticoagulation-related ED visit, Anticoagulation-related fatality    Comments:  Patient was discharged from [facility] on 4/22/22 after left below the knee amputation. At discharge patients warfarin was changed to 2mg daily which he has been taking as prescribed. He is now re-admitted to home care with [facility] who goes to his home on Mondays and Fridays.            OBJECTIVE    Recent labs: (last 7 days)     04/27/22  1110   INR 3.0       ASSESSMENT / PLAN  INR assessment THER    Recheck INR In: 5 DAYS    INR Location [facility] INR      Anticoagulation Summary  As of 4/27/2022    INR goal:  2.0-3.0   TTR:  72.7 % (8.3 mo)   INR used for dosing:  3.0 (4/27/2022)   Warfarin maintenance plan:  2 mg (2 mg x 1) every day   Full warfarin instructions:  2 mg every day   Weekly warfarin total:  14 mg   Plan last modified:  Michael Ramos RN (4/27/2022)   Next INR check:  5/2/2022   Priority:  Maintenance   Target end date:  8/10/2027    Indications    PAD (peripheral artery disease) (H) [I73.9]  History of cerebrovascular accident on 11/16/2020 [Z86.73]  History of ITP on 4/16/2021 [Z86.2]  H/O STEMI on 4/15/2021 [I25.2]  History of cardiac arrest  [Z86.74]  Atherosclerosis of native coronary artery of native heart without angina pectoris [I25.10]             Anticoagulation Episode Summary     INR check location:      Preferred lab:      Send INR reminders to:  [facility]    Comments:        Anticoagulation Care Providers     Provider Role Specialty Phone number    Danii Ortiz NP Referring Internal Medicine [phone]            See the Encounter Report to view Anticoagulation Flowsheet and Dosing Calendar (Go to Encounters tab in chart review, and find the Anticoagulation Therapy Visit)    Michael Ramos RN

## 2022-04-28 ENCOUNTER — TELEPHONE (OUTPATIENT)
Dept: INTERNAL MEDICINE | Facility: OTHER | Age: 67
End: 2022-04-28
Payer: MEDICARE

## 2022-04-28 NOTE — TELEPHONE ENCOUNTER
See previous message.  This has been ok'd already.  Negin Turcios CMA([facility])..................4/28/2022   8:55 AM

## 2022-04-28 NOTE — TELEPHONE ENCOUNTER
Need order for PT 1 x for 4 weeks for Exercise and transfer training.      Poornima Jackson on 4/28/2022 at 8:13 AM

## 2022-04-29 ENCOUNTER — OFFICE VISIT (OUTPATIENT)
Dept: INTERNAL MEDICINE | Facility: OTHER | Age: 67
End: 2022-04-29
Attending: NURSE PRACTITIONER
Payer: COMMERCIAL

## 2022-04-29 VITALS
RESPIRATION RATE: 20 BRPM | SYSTOLIC BLOOD PRESSURE: 110 MMHG | HEART RATE: 66 BPM | TEMPERATURE: 97 F | OXYGEN SATURATION: 100 % | DIASTOLIC BLOOD PRESSURE: 66 MMHG

## 2022-04-29 DIAGNOSIS — I50.42 CHRONIC COMBINED SYSTOLIC AND DIASTOLIC CONGESTIVE HEART FAILURE (H): ICD-10-CM

## 2022-04-29 DIAGNOSIS — E11.65 TYPE 2 DIABETES MELLITUS WITH HYPERGLYCEMIA, WITH LONG-TERM CURRENT USE OF INSULIN (H): Primary | ICD-10-CM

## 2022-04-29 DIAGNOSIS — N18.6 ESRD (END STAGE RENAL DISEASE) ON DIALYSIS (H): ICD-10-CM

## 2022-04-29 DIAGNOSIS — Z99.2 ESRD (END STAGE RENAL DISEASE) ON DIALYSIS (H): ICD-10-CM

## 2022-04-29 DIAGNOSIS — N18.4 CKD (CHRONIC KIDNEY DISEASE) STAGE 4, GFR 15-29 ML/MIN (H): ICD-10-CM

## 2022-04-29 DIAGNOSIS — I27.20 MILD PULMONARY HYPERTENSION (H): ICD-10-CM

## 2022-04-29 DIAGNOSIS — Z86.73 HISTORY OF CEREBROVASCULAR ACCIDENT: ICD-10-CM

## 2022-04-29 DIAGNOSIS — Z79.4 TYPE 2 DIABETES MELLITUS WITH HYPERGLYCEMIA, WITH LONG-TERM CURRENT USE OF INSULIN (H): Primary | ICD-10-CM

## 2022-04-29 DIAGNOSIS — R79.89 ELEVATED FERRITIN: ICD-10-CM

## 2022-04-29 DIAGNOSIS — I25.2 HISTORY OF ST ELEVATION MYOCARDIAL INFARCTION (STEMI): ICD-10-CM

## 2022-04-29 DIAGNOSIS — I69.354 HEMIPLEGIA AND HEMIPARESIS FOLLOWING CEREBRAL INFARCTION AFFECTING LEFT NON-DOMINANT SIDE (H): ICD-10-CM

## 2022-04-29 PROCEDURE — 99214 OFFICE O/P EST MOD 30 MIN: CPT | Performed by: NURSE PRACTITIONER

## 2022-04-29 PROCEDURE — G0463 HOSPITAL OUTPT CLINIC VISIT: HCPCS

## 2022-04-29 ASSESSMENT — PAIN SCALES - GENERAL: PAINLEVEL: NO PAIN (0)

## 2022-04-29 NOTE — PROGRESS NOTES
"  Assessment & Plan     Type 2 diabetes mellitus with hyperglycemia, with long-term current use of insulin (H)  - CBC and Differential    CKD (chronic kidney disease) stage 4, GFR 15-29 ml/min (H)  - Comprehensive metabolic panel    Elevated ferritin  - Ferritin  - Iron Binding Panel    The ASCVD Risk score (Nikky ABBOTT Jr., et al., 2013) failed to calculate for the following reasons:    The patient has a prior MI or stroke diagnosis          Ordering of each unique test  Prescription drug management  30 minutes spent on the date of the encounter doing chart review, history and exam, documentation and further activities per the note     BMI:   Estimated body mass index is 29.59 kg/m  as calculated from the following:    Height as of 2/22/22: 1.778 m (5' 10\").    Weight as of 2/22/22: 93.5 kg (206 lb 3.2 oz).     Return in about 4 weeks (around 5/27/2022) for Recheck.    Danii Ortiz NP  [facility]    Kenia Rivera is a 66 year old who presents for the following health issues - Post hospital f/u, left lower leg amputation.  Maggie Lopez Syk accompanies patient.    Patient reports having transportation issues to get to/from dialysis. He reports he will not be able to get to dialysis if he cannot get some assistance.  He is ordered to go to dialysis 3x week. He lives in his own house.    He does have [facility] services currently.     History of Present Illness       Reason for visit:  Check up  Symptom onset:  More than a month  Symptoms include:  Lost leg  Symptom intensity:  Mild  Symptom progression:  Staying the same  Had these symptoms before:  No  What makes it worse:  Check up  What makes it better:  No    He eats 0-1 servings of fruits and vegetables daily.He consumes 0 sweetened beverage(s) daily.He exercises with enough effort to increase his heart rate 9 or less minutes per day.  He exercises with enough effort to increase his heart rate 3 or less days per " week.   He is taking medications regularly.     Has history of hemiplegia and hemiparesis following CVA with left side affected.  History of lower extremity claudication, numbness and tingling of extremities, COPD, KEISHA non compliant with CPAP, morbid obesity but severe malnutrition, History of T2DM uncontrolled and reports he is not using insulin. History of HTN, hyperlipidemia, ASCVD, CHF, PAD, CAD, persistent A fib, hx of sudden cardiac arrest with successful rescuscitation. He has ESRD for which he needs dialysis. He takes coumadin daily.  Recently had left leg amputated for chronic non healing ulcer of left foot related to uncontrolled DM. Reports he came home from hospital on 4/22/22.  Feels left leg is healing well, wound care and INR are managed by Home care.    Review of Systems   Constitutional, HEENT, cardiovascular, pulmonary, GI, , musculoskeletal, neuro, skin, endocrine and psych systems are negative, except as otherwise noted.      Objective    /66 (BP Location: Right arm, Patient Position: Sitting, Cuff Size: Adult Regular)   Pulse 66   Temp 97  F (36.1  C) (Tympanic)   Resp 20   SpO2 100%   There is no height or weight on file to calculate BMI.  Physical Exam   GENERAL: alert, no distress, frail and appears older than stated age  EYES: Eyes grossly normal to inspection, PERRL and conjunctivae and sclerae normal  RESP: lungs clear to auscultation - no rales, rhonchi or wheezes  CV: irregularly irregular rhythm  ABDOMEN: soft, nontender and bowel sounds normal  MS: decreased range of motion in left leg, +1 edema to right lower extremity and LLE exam shows BKA.  SKIN: no suspicious lesions or rashes  NEURO: Normal strength and tone, mentation intact and speech normal  PSYCH: mentation appears normal, speech pressured and appearance disheveled    No results found for any visits on 04/29/22. Patient refuses labs in clinic - wants them drawn at dialysis. Orders will be faxed to  "[facility].    Nursing Notes:   OmahaJose Manuel  4/29/2022  1:30 PM  Signed  Chief Complaint   Patient presents with     Surgical Followup     Left Lower leg amputation         Initial /66 (BP Location: Right arm, Patient Position: Sitting, Cuff Size: Adult Regular)   Pulse 66   Temp 97  F (36.1  C) (Tympanic)   Resp 20   SpO2 100%  Estimated body mass index is 29.59 kg/m  as calculated from the following:    Height as of 2/22/22: 1.778 m (5' 10\").    Weight as of 2/22/22: 93.5 kg (206 lb 3.2 oz).       FOOD SECURITY SCREENING QUESTIONS:    The next two questions are to help us understand your food security.  If you are feeling you need any assistance in this area, we have resources available to support you today.    Hunger Vital Signs:  Within the past 12 months we worried whether our food would run out before we got money to buy more. Never  Within the past 12 months the food we bought just didn't last and we didn't have money to get more. Never    Advance Care Directive on file? no      Medication reconciliation complete.      Jose Manuel Zarate,on 4/29/2022 at 1:27 PM          Nursing Notes:   Jose Manuel Zarate  4/29/2022  1:30 PM  Signed  Chief Complaint   Patient presents with     Surgical Followup     Left Lower leg amputation         Initial /66 (BP Location: Right arm, Patient Position: Sitting, Cuff Size: Adult Regular)   Pulse 66   Temp 97  F (36.1  C) (Tympanic)   Resp 20   SpO2 100%  Estimated body mass index is 29.59 kg/m  as calculated from the following:    Height as of 2/22/22: 1.778 m (5' 10\").    Weight as of 2/22/22: 93.5 kg (206 lb 3.2 oz).       FOOD SECURITY SCREENING QUESTIONS:    The next two questions are to help us understand your food security.  If you are feeling you need any assistance in this area, we have resources available to support you today.    Hunger Vital Signs:  Within the past 12 months we worried whether our food would run out before we got money to buy " more. Never  Within the past 12 months the food we bought just didn't last and we didn't have money to get more. Never    Advance Care Directive on file? no      Medication reconciliation complete.      Jose Manuel Zarate,on 4/29/2022 at 1:27 PM

## 2022-04-29 NOTE — NURSING NOTE
"Chief Complaint   Patient presents with     Surgical Followup     Left Lower leg amputation         Initial /66 (BP Location: Right arm, Patient Position: Sitting, Cuff Size: Adult Regular)   Pulse 66   Temp 97  F (36.1  C) (Tympanic)   Resp 20   SpO2 100%  Estimated body mass index is 29.59 kg/m  as calculated from the following:    Height as of 2/22/22: 1.778 m (5' 10\").    Weight as of 2/22/22: 93.5 kg (206 lb 3.2 oz).       FOOD SECURITY SCREENING QUESTIONS:    The next two questions are to help us understand your food security.  If you are feeling you need any assistance in this area, we have resources available to support you today.    Hunger Vital Signs:  Within the past 12 months we worried whether our food would run out before we got money to buy more. Never  Within the past 12 months the food we bought just didn't last and we didn't have money to get more. Never    Advance Care Directive on file? no      Medication reconciliation complete.      Jose Manuel Zarate,on 4/29/2022 at 1:27 PM        "

## 2022-05-02 ENCOUNTER — ANTICOAGULATION THERAPY VISIT (OUTPATIENT)
Dept: ANTICOAGULATION | Facility: OTHER | Age: 67
End: 2022-05-02
Attending: INTERNAL MEDICINE
Payer: COMMERCIAL

## 2022-05-02 DIAGNOSIS — Z86.2 HISTORY OF ITP: ICD-10-CM

## 2022-05-02 DIAGNOSIS — I73.9 PAD (PERIPHERAL ARTERY DISEASE) (H): Primary | ICD-10-CM

## 2022-05-02 DIAGNOSIS — Z86.73 HISTORY OF CEREBROVASCULAR ACCIDENT: ICD-10-CM

## 2022-05-02 DIAGNOSIS — I25.10 ATHEROSCLEROSIS OF NATIVE CORONARY ARTERY OF NATIVE HEART WITHOUT ANGINA PECTORIS: ICD-10-CM

## 2022-05-02 DIAGNOSIS — Z86.74 HISTORY OF CARDIAC ARREST: ICD-10-CM

## 2022-05-02 DIAGNOSIS — I25.2 HISTORY OF ST ELEVATION MYOCARDIAL INFARCTION (STEMI): ICD-10-CM

## 2022-05-02 LAB — INR (EXTERNAL): 1.9 (ref 0.9–1.1)

## 2022-05-02 NOTE — PROGRESS NOTES
ANTICOAGULATION FOLLOW-UP CLINIC VISIT    Patient Name:  Miguel Parisi  Date:  2022  Contact Type:  Telephone/ spoke with Nanette mccarthy with [facility]    SUBJECTIVE:  Patient Findings         Clinical Outcomes     Negatives:  Major bleeding event, Thromboembolic event, Anticoagulation-related hospital admission, Anticoagulation-related ED visit, Anticoagulation-related fatality           OBJECTIVE    Recent labs: (last 7 days)     22  1043   INR 1.9*       ASSESSMENT / PLAN  INR assessment SUB    Recheck INR In: 5 DAYS    INR Location [facility] INR      Anticoagulation Summary  As of 2022    INR goal:  2.0-3.0   TTR:  73.1 % (8.5 mo)   INR used for dosin.9 (2022)   Warfarin maintenance plan:  4 mg (2 mg x 2) every Mon; 2 mg (2 mg x 1) all other days   Full warfarin instructions:  4 mg every Mon; 2 mg all other days   Weekly warfarin total:  16 mg   Plan last modified:  Gissel Arechiga RN (2022)   Next INR check:  2022   Priority:  Maintenance   Target end date:  8/10/2027    Indications    PAD (peripheral artery disease) (H) [I73.9]  History of cerebrovascular accident on 2020 [Z86.73]  History of ITP on 2021 [Z86.2]  H/O STEMI on 4/15/2021 [I25.2]  History of cardiac arrest [Z86.74]  Atherosclerosis of native coronary artery of native heart without angina pectoris [I25.10]             Anticoagulation Episode Summary     INR check location:      Preferred lab:      Send INR reminders to:  [facility]    Comments:        Anticoagulation Care Providers     Provider Role Specialty Phone number    Danii Ortiz NP Referring Internal Medicine [phone]            See the Encounter Report to view Anticoagulation Flowsheet and Dosing Calendar (Go to Encounters tab in chart review, and find the Anticoagulation Therapy Visit)        Gissel Arechiga, RN

## 2022-05-02 NOTE — TELEPHONE ENCOUNTER
Dr Yin reviewed and completed the following home care or hospice form(s) for Miguel Parisi / [facility] on 5/2/22.   This covers the certification period effective 4/23/22 to 6/21/22.  Negin Turcios, TERRY on 5/2/2022 at 9:49 AM

## 2022-05-03 ENCOUNTER — PATIENT OUTREACH (OUTPATIENT)
Dept: CARE COORDINATION | Facility: CLINIC | Age: 67
End: 2022-05-03
Payer: MEDICARE

## 2022-05-03 NOTE — PROGRESS NOTES
"Clinic Care Coordination Contact  [facility]/Voicemail     Clinical Data: Care Coordinator Outreach  Outreach attempted x 1.  Left message on unable to leave voicemail, not set up.  Plan: Care Coordinator try alternative number.    Call to alternative number. Spoke with Rosalind, consent on file for wife.  Miguel is at dialysis.   He has been home: 2 weeks on Friday.  He is doing okay. Home Care Services: nurse, physical therapy.     Current need is transportation.   Is using a wheelchair to get around following amputation. Doesn't have leg.   No stairs at home. Is sleeping in recliner in the \"front room\". Has a commode in the front room.   He isn't able to get into the bathroom with his wheelchair.     Has contacted [facility] for any handicap accessible assistance, but was told they don't have any funds/grants right now.     Lives in [facility].   Noted insurance is [employer].     Plan: Look into services.   Nicky Del Angel RN ,....................  5/3/2022   11:10 AM        "

## 2022-05-03 NOTE — TELEPHONE ENCOUNTER
I agree with the Home Care order requests below.     PT 1 x for 4 weeks for Exercise and transfer training.    Poornima Jackson on 4/28/2022 at 8:13 AM      Yari Yin DO on 5/3/2022 at 11:22 AM

## 2022-05-04 PROBLEM — I69.393 ATAXIA DUE TO OLD CEREBELLAR INFARCTION: Status: RESOLVED | Noted: 2021-06-17 | Resolved: 2022-05-04

## 2022-05-04 PROBLEM — I48.0 PAROXYSMAL A-FIB (H): Status: ACTIVE | Noted: 2021-08-17

## 2022-05-04 PROBLEM — R65.21 SEVERE SEPSIS WITH SEPTIC SHOCK (H): Status: ACTIVE | Noted: 2022-02-23

## 2022-05-04 PROBLEM — I69.398 IMPAIRED BALANCE AS LATE EFFECT OF CEREBROVASCULAR ACCIDENT (CVA): Status: RESOLVED | Noted: 2020-12-18 | Resolved: 2022-05-04

## 2022-05-04 PROBLEM — L97.511 ULCER OF RIGHT FOOT, LIMITED TO BREAKDOWN OF SKIN (H): Status: ACTIVE | Noted: 2021-06-17

## 2022-05-04 PROBLEM — A41.9 SEVERE SEPSIS WITH SEPTIC SHOCK (H): Status: ACTIVE | Noted: 2022-02-23

## 2022-05-04 PROBLEM — R65.21 SEVERE SEPSIS WITH SEPTIC SHOCK (H): Status: RESOLVED | Noted: 2022-02-23 | Resolved: 2022-05-04

## 2022-05-04 PROBLEM — E43 SEVERE PROTEIN-CALORIE MALNUTRITION (H): Status: ACTIVE | Noted: 2021-05-24

## 2022-05-04 PROBLEM — R20.2 NUMBNESS AND TINGLING IN LEFT HAND: Status: RESOLVED | Noted: 2022-01-11 | Resolved: 2022-05-04

## 2022-05-04 PROBLEM — D72.829 LEUKOCYTOSIS: Status: RESOLVED | Noted: 2021-07-25 | Resolved: 2022-05-04

## 2022-05-04 PROBLEM — E11.29 TYPE 2 DIABETES MELLITUS WITH MICROALBUMINURIA, WITH LONG-TERM CURRENT USE OF INSULIN (H): Status: ACTIVE | Noted: 2019-01-28

## 2022-05-04 PROBLEM — R20.0 NUMBNESS AND TINGLING IN LEFT HAND: Status: RESOLVED | Noted: 2022-01-11 | Resolved: 2022-05-04

## 2022-05-04 PROBLEM — R80.9 TYPE 2 DIABETES MELLITUS WITH MICROALBUMINURIA, WITH LONG-TERM CURRENT USE OF INSULIN (H): Status: ACTIVE | Noted: 2019-01-28

## 2022-05-04 PROBLEM — I25.729 CORONARY ARTERY DISEASE INVOLVING AUTOLOGOUS ARTERY CORONARY BYPASS GRAFT WITH ANGINA PECTORIS (H): Status: ACTIVE | Noted: 2022-02-25

## 2022-05-04 PROBLEM — R26.89 IMPAIRED BALANCE AS LATE EFFECT OF CEREBROVASCULAR ACCIDENT (CVA): Status: RESOLVED | Noted: 2020-12-18 | Resolved: 2022-05-04

## 2022-05-04 PROBLEM — A41.9 SEVERE SEPSIS WITH SEPTIC SHOCK (H): Status: RESOLVED | Noted: 2022-02-23 | Resolved: 2022-05-04

## 2022-05-04 PROBLEM — R53.1 WEAKNESS OF LEFT SIDE OF BODY: Status: RESOLVED | Noted: 2021-06-17 | Resolved: 2022-05-04

## 2022-05-04 PROBLEM — Z89.512 S/P BKA (BELOW KNEE AMPUTATION), LEFT (H): Status: ACTIVE | Noted: 2022-04-05

## 2022-05-04 PROBLEM — I50.22 CHRONIC SYSTOLIC CONGESTIVE HEART FAILURE (H): Status: ACTIVE | Noted: 2021-06-17

## 2022-05-06 ENCOUNTER — ANTICOAGULATION THERAPY VISIT (OUTPATIENT)
Dept: ANTICOAGULATION | Facility: OTHER | Age: 67
End: 2022-05-06
Attending: INTERNAL MEDICINE
Payer: COMMERCIAL

## 2022-05-06 DIAGNOSIS — Z86.74 HISTORY OF CARDIAC ARREST: ICD-10-CM

## 2022-05-06 DIAGNOSIS — Z86.73 HISTORY OF CEREBROVASCULAR ACCIDENT: ICD-10-CM

## 2022-05-06 DIAGNOSIS — I73.9 PAD (PERIPHERAL ARTERY DISEASE) (H): Primary | ICD-10-CM

## 2022-05-06 DIAGNOSIS — I25.10 ATHEROSCLEROSIS OF NATIVE CORONARY ARTERY OF NATIVE HEART WITHOUT ANGINA PECTORIS: ICD-10-CM

## 2022-05-06 DIAGNOSIS — Z86.2 HISTORY OF ITP: ICD-10-CM

## 2022-05-06 DIAGNOSIS — I25.2 HISTORY OF ST ELEVATION MYOCARDIAL INFARCTION (STEMI): ICD-10-CM

## 2022-05-06 LAB — INR (EXTERNAL): 2.5 (ref 0.9–1.1)

## 2022-05-06 NOTE — PROGRESS NOTES
ANTICOAGULATION MANAGEMENT     Miguel Parisi 66 year old male is on warfarin with therapeutic INR result. (Goal INR 2.0-3.0)    Recent labs: (last 7 days)     05/06/22  1333   INR 2.5*       ASSESSMENT       Source(s): Chart Review and Home Care/Facility Nurse       Warfarin doses taken: Warfarin taken as instructed    Diet: No new diet changes identified    New illness, injury, or hospitalization: No    Medication/supplement changes: None noted    Signs or symptoms of bleeding or clotting: No    Previous INR: Therapeutic last 2(+) visits    Additional findings: None       PLAN     Recommended plan for no diet, medication or health factor changes affecting INR     Dosing Instructions: continue your current warfarin dose with next INR in 1 week       Summary  As of 5/6/2022    Full warfarin instructions:  4 mg every Mon; 2 mg all other days   Next INR check:  5/13/2022             Telephone call with [facility] home care/facility nurse who agrees to plan and repeated back plan correctly    Orders given to  Homecare nurse/facility to recheck    Education provided: None required    Plan made per [facility] anticoagulation protocol    Gissel Arechiga, RN  Anticoagulation Clinic  5/6/2022    _______________________________________________________________________     Anticoagulation Episode Summary     Current INR goal:  2.0-3.0   TTR:  73.2 % (8.6 mo)   Target end date:  8/10/2027   Send INR reminders to:  [facility]    Indications    PAD (peripheral artery disease) (H) [I73.9]  History of cerebrovascular accident on 11/16/2020 [Z86.73]  History of ITP on 4/16/2021 [Z86.2]  H/O STEMI on 4/15/2021 [I25.2]  History of cardiac arrest [Z86.74]  Atherosclerosis of native coronary artery of native heart without angina pectoris [I25.10]           Comments:           Anticoagulation Care Providers     Provider Role Specialty Phone number    Danii Ortiz NP Referring Internal Medicine [phone]

## 2022-05-13 ENCOUNTER — ANTICOAGULATION THERAPY VISIT (OUTPATIENT)
Dept: ANTICOAGULATION | Facility: OTHER | Age: 67
End: 2022-05-13
Attending: INTERNAL MEDICINE
Payer: COMMERCIAL

## 2022-05-13 DIAGNOSIS — Z86.74 HISTORY OF CARDIAC ARREST: ICD-10-CM

## 2022-05-13 DIAGNOSIS — I73.9 PAD (PERIPHERAL ARTERY DISEASE) (H): Primary | ICD-10-CM

## 2022-05-13 DIAGNOSIS — Z86.73 HISTORY OF CEREBROVASCULAR ACCIDENT: ICD-10-CM

## 2022-05-13 DIAGNOSIS — I25.2 HISTORY OF ST ELEVATION MYOCARDIAL INFARCTION (STEMI): ICD-10-CM

## 2022-05-13 DIAGNOSIS — I25.10 ATHEROSCLEROSIS OF NATIVE CORONARY ARTERY OF NATIVE HEART WITHOUT ANGINA PECTORIS: ICD-10-CM

## 2022-05-13 DIAGNOSIS — Z86.2 HISTORY OF ITP: ICD-10-CM

## 2022-05-13 LAB — INR POINT OF CARE: 2.4 (ref 0.9–1.1)

## 2022-05-13 PROCEDURE — 85610 PROTHROMBIN TIME: CPT | Mod: ZL,QW

## 2022-05-13 PROCEDURE — 36416 COLLJ CAPILLARY BLOOD SPEC: CPT | Mod: ZL

## 2022-05-13 NOTE — PROGRESS NOTES
ANTICOAGULATION FOLLOW-UP CLINIC VISIT    Patient Name:  Miguel Parisi  Date:  2022  Contact Type:  Telephone[facility]     SUBJECTIVE:  Patient Findings     Positives:  Change in medications    Comments:  Per homecare nurse [facility] patient was started on Doxycyline on 05/10/22 for 5 days.         Clinical Outcomes     Negatives:  Major bleeding event, Thromboembolic event, Anticoagulation-related hospital admission, Anticoagulation-related ED visit, Anticoagulation-related fatality    Comments:  Per homecare nurse [facility] patient was started on Doxycyline on 05/10/22 for 5 days.            OBJECTIVE    Recent labs: (last 7 days)     22  1500   INR 2.4*       ASSESSMENT / PLAN  INR assessment THER    Recheck INR In: 1 WEEK    INR Location Homecare INR      Anticoagulation Summary  As of 2022    INR goal:  2.0-3.0   TTR:  73.9 % (8.9 mo)   INR used for dosin.4 (2022)   Warfarin maintenance plan:  4 mg (2 mg x 2) every Mon; 2 mg (2 mg x 1) all other days   Full warfarin instructions:  4 mg every Mon; 2 mg all other days   Weekly warfarin total:  16 mg   No change documented:  Ilsa Hawkins RN   Plan last modified:  Gissel Arechiga RN (2022)   Next INR check:  2022   Priority:  Maintenance   Target end date:  8/10/2027    Indications    PAD (peripheral artery disease) (H) [I73.9]  History of cerebrovascular accident on 2020 [Z86.73]  History of ITP on 2021 [Z86.2]  H/O STEMI on 4/15/2021 [I25.2]  History of cardiac arrest [Z86.74]  Atherosclerosis of native coronary artery of native heart without angina pectoris [I25.10]             Anticoagulation Episode Summary     INR check location:      Preferred lab:      Send INR reminders to:  [facility]    Comments:        Anticoagulation Care Providers     Provider Role Specialty Phone number    Danii Ortiz NP Referring Internal Medicine [phone]            See the  Encounter Report to view Anticoagulation Flowsheet and Dosing Calendar (Go to Encounters tab in chart review, and find the Anticoagulation Therapy Visit)        Ilsa Hawkins RN

## 2022-05-20 ENCOUNTER — ANTICOAGULATION THERAPY VISIT (OUTPATIENT)
Dept: ANTICOAGULATION | Facility: OTHER | Age: 67
End: 2022-05-20
Attending: INTERNAL MEDICINE
Payer: COMMERCIAL

## 2022-05-20 DIAGNOSIS — Z86.74 HISTORY OF CARDIAC ARREST: ICD-10-CM

## 2022-05-20 DIAGNOSIS — I25.2 HISTORY OF ST ELEVATION MYOCARDIAL INFARCTION (STEMI): ICD-10-CM

## 2022-05-20 DIAGNOSIS — I25.10 ATHEROSCLEROSIS OF NATIVE CORONARY ARTERY OF NATIVE HEART WITHOUT ANGINA PECTORIS: ICD-10-CM

## 2022-05-20 DIAGNOSIS — Z86.2 HISTORY OF ITP: ICD-10-CM

## 2022-05-20 DIAGNOSIS — Z86.73 HISTORY OF CEREBROVASCULAR ACCIDENT: ICD-10-CM

## 2022-05-20 DIAGNOSIS — I73.9 PAD (PERIPHERAL ARTERY DISEASE) (H): Primary | ICD-10-CM

## 2022-05-20 LAB — INR (EXTERNAL): 2.1 (ref 0.9–1.1)

## 2022-05-20 NOTE — PROGRESS NOTES
ANTICOAGULATION MANAGEMENT     Miguel Parisi 66 year old male is on warfarin with therapeutic INR result. (Goal INR 2.0-3.0)    Recent labs: (last 7 days)     05/20/22  1600   INR 2.1*       ASSESSMENT       Source(s): Chart Review and Home Care/Facility Nurse       Warfarin doses taken: Warfarin taken as instructed    Diet: No new diet changes identified    New illness, injury, or hospitalization: No    Medication/supplement changes: continues with Doxycycline for infection    Signs or symptoms of bleeding or clotting: No    Previous INR: Therapeutic last 2(+) visits    Additional findings: None       PLAN     Recommended plan for temporary change(s) affecting INR     Dosing Instructions: continue your current warfarin dose with next INR in 1 week       Summary  As of 5/20/2022    Full warfarin instructions:  4 mg every Mon; 2 mg all other days   Next INR check:  5/27/2022             Telephone call with Heavenly with [facility] home care/facility nurse who verbalizes understanding and agrees to plan    Orders given to  Homecare nurse/facility to recheck    Education provided: None required    Plan made per [facility] anticoagulation protocol    Gissel Arechiga RN  Anticoagulation Clinic  5/20/2022    _______________________________________________________________________     Anticoagulation Episode Summary     Current INR goal:  2.0-3.0   TTR:  74.6 % (9.1 mo)   Target end date:  8/10/2027   Send INR reminders to:  [facility]    Indications    PAD (peripheral artery disease) (H) [I73.9]  History of cerebrovascular accident on 11/16/2020 [Z86.73]  History of ITP on 4/16/2021 [Z86.2]  H/O STEMI on 4/15/2021 [I25.2]  History of cardiac arrest [Z86.74]  Atherosclerosis of native coronary artery of native heart without angina pectoris [I25.10]           Comments:           Anticoagulation Care Providers     Provider Role Specialty Phone number    Danii Ortiz NP Referring Internal Medicine  [phone]

## 2022-05-27 ENCOUNTER — ANTICOAGULATION THERAPY VISIT (OUTPATIENT)
Dept: ANTICOAGULATION | Facility: OTHER | Age: 67
End: 2022-05-27
Attending: INTERNAL MEDICINE
Payer: MEDICARE

## 2022-05-27 DIAGNOSIS — I25.2 HISTORY OF ST ELEVATION MYOCARDIAL INFARCTION (STEMI): ICD-10-CM

## 2022-05-27 DIAGNOSIS — Z86.2 HISTORY OF ITP: ICD-10-CM

## 2022-05-27 DIAGNOSIS — I25.10 ATHEROSCLEROSIS OF NATIVE CORONARY ARTERY OF NATIVE HEART WITHOUT ANGINA PECTORIS: ICD-10-CM

## 2022-05-27 DIAGNOSIS — Z86.73 HISTORY OF CEREBROVASCULAR ACCIDENT: ICD-10-CM

## 2022-05-27 DIAGNOSIS — I73.9 PAD (PERIPHERAL ARTERY DISEASE) (H): Primary | ICD-10-CM

## 2022-05-27 DIAGNOSIS — Z86.74 HISTORY OF CARDIAC ARREST: ICD-10-CM

## 2022-05-27 LAB — INR (EXTERNAL): 2.6 (ref 0.9–1.1)

## 2022-05-27 NOTE — PROGRESS NOTES
ANTICOAGULATION MANAGEMENT     Miguel Parisi 66 year old male is on warfarin with therapeutic INR result. (Goal INR 2.0-3.0)    Recent labs: (last 7 days)     05/27/22  1441   INR 2.6*       ASSESSMENT       Source(s): Chart Review and Patient/Caregiver Call       Warfarin doses taken: Warfarin taken as instructed    Diet: No new diet changes identified    New illness, injury, or hospitalization: Yes: Future amputation    Medication/supplement changes: None noted    Signs or symptoms of bleeding or clotting: No    Previous INR: Therapeutic last visit; previously outside of goal range    Additional findings: None       PLAN     Recommended plan for temporary change(s) affecting INR     Dosing Instructions: Holding x 3 days for surgical procedure. with next INR in 5 days       Summary  As of 5/27/2022    Full warfarin instructions:  5/28: Hold; 5/29: Hold; Otherwise 4 mg every Mon; 2 mg all other days   Next INR check:  6/1/2022             Telephone call with [facility] home care/facility nurse who verbalizes understanding and agrees to plan    Patient to recheck with home meter    Education provided: None required    Plan made per [facility] anticoagulation protocol    Radha Huggins RN  Anticoagulation Clinic  5/27/2022    _______________________________________________________________________     Anticoagulation Episode Summary     Current INR goal:  2.0-3.0   TTR:  75.2 % (9.3 mo)   Target end date:  8/10/2027   Send INR reminders to:  [facility]    Indications    PAD (peripheral artery disease) (H) [I73.9]  History of cerebrovascular accident on 11/16/2020 [Z86.73]  History of ITP on 4/16/2021 [Z86.2]  H/O STEMI on 4/15/2021 [I25.2]  History of cardiac arrest [Z86.74]  Atherosclerosis of native coronary artery of native heart without angina pectoris [I25.10]           Comments:           Anticoagulation Care Providers     Provider Role Specialty Phone number    Danii Ortiz NP Referring Internal  Medicine [phone]

## 2022-06-07 ENCOUNTER — TELEPHONE (OUTPATIENT)
Dept: INTERNAL MEDICINE | Facility: OTHER | Age: 67
End: 2022-06-07
Payer: MEDICARE

## 2022-06-07 NOTE — TELEPHONE ENCOUNTER
S. Counts include 234 beds at the [facility] verbal orders for twice a week skilled nursing. Thank you.  Betzy Ojeda

## 2022-06-08 ENCOUNTER — TELEPHONE (OUTPATIENT)
Dept: INTERNAL MEDICINE | Facility: OTHER | Age: 67
End: 2022-06-08
Payer: MEDICARE

## 2022-06-08 NOTE — TELEPHONE ENCOUNTER
PT/OT/Home Care order ok relayed.  Negin Turcios CMA([facility])..................6/8/2022   12:33 PM

## 2022-06-08 NOTE — TELEPHONE ENCOUNTER
Juli called to get verbal orders for 1x a week for one week for evaluation only and [facility] is [facility] on 6/8/2022 at 4:46 PM

## 2022-06-09 NOTE — TELEPHONE ENCOUNTER
PT/OT/Home Care order ok relayed.  Negin Turcios CMA([facility])..................6/9/2022   3:25 PM

## 2022-06-10 ENCOUNTER — ANTICOAGULATION THERAPY VISIT (OUTPATIENT)
Dept: ANTICOAGULATION | Facility: OTHER | Age: 67
End: 2022-06-10
Attending: INTERNAL MEDICINE
Payer: COMMERCIAL

## 2022-06-10 DIAGNOSIS — I25.10 ATHEROSCLEROSIS OF NATIVE CORONARY ARTERY OF NATIVE HEART WITHOUT ANGINA PECTORIS: ICD-10-CM

## 2022-06-10 DIAGNOSIS — Z86.73 HISTORY OF CEREBROVASCULAR ACCIDENT: ICD-10-CM

## 2022-06-10 DIAGNOSIS — I25.2 HISTORY OF ST ELEVATION MYOCARDIAL INFARCTION (STEMI): ICD-10-CM

## 2022-06-10 DIAGNOSIS — Z86.2 HISTORY OF ITP: ICD-10-CM

## 2022-06-10 DIAGNOSIS — I73.9 PAD (PERIPHERAL ARTERY DISEASE) (H): Primary | ICD-10-CM

## 2022-06-10 DIAGNOSIS — Z86.74 HISTORY OF CARDIAC ARREST: ICD-10-CM

## 2022-06-10 LAB — INR (EXTERNAL): 2 (ref 0.9–1.1)

## 2022-06-10 NOTE — PROGRESS NOTES
ANTICOAGULATION MANAGEMENT     Miguel Parisi 66 year old male is on warfarin with therapeutic INR result. (Goal INR 2.0-3.0)    Recent labs: (last 7 days)     06/10/22  1330   INR 2.0*       ASSESSMENT       Source(s): Chart Review and Patient/Caregiver Call       Warfarin doses taken: Warfarin taken as instructed    Diet: No new diet changes identified    New illness, injury, or hospitalization: Yes: Hospitalize 5/31-6/6 and toe amputation.    Medication/supplement changes: None noted    Signs or symptoms of bleeding or clotting: No    Previous INR: Therapeutic last visit; previously outside of goal range    Additional findings: None       PLAN     Recommended plan for no diet, medication or health factor changes affecting INR     Dosing Instructions: continue your current warfarin dose with next INR in 1 week       Summary  As of 6/10/2022    Full warfarin instructions:  4 mg every Mon; 2 mg all other days   Next INR check:  6/17/2022             Telephone call with Miguel who verbalizes understanding and agrees to plan    Orders given to  Homecare nurse/facility to recheck    Education provided: None required    Plan made per [facility] anticoagulation protocol    Radha Huggins RN  Anticoagulation Clinic  6/10/2022    _______________________________________________________________________     Anticoagulation Episode Summary     Current INR goal:  2.0-3.0   TTR:  76.4 % (9.8 mo)   Target end date:  8/10/2027   Send INR reminders to:  [facility]    Indications    PAD (peripheral artery disease) (H) [I73.9]  History of cerebrovascular accident on 11/16/2020 [Z86.73]  History of ITP on 4/16/2021 [Z86.2]  H/O STEMI on 4/15/2021 [I25.2]  History of cardiac arrest [Z86.74]  Atherosclerosis of native coronary artery of native heart without angina pectoris [I25.10]           Comments:  Homecare- [facility].         Anticoagulation Care Providers     Provider Role Specialty Phone number    Diana,  MOY F[facility] Internal Medicine [phone]

## 2022-06-14 ENCOUNTER — TELEPHONE (OUTPATIENT)
Dept: INTERNAL MEDICINE | Facility: OTHER | Age: 67
End: 2022-06-14
Payer: MEDICARE

## 2022-06-14 NOTE — TELEPHONE ENCOUNTER
Looking for verbal orders PT 1x week for 2 weeks for Exercise    Please call back Lauren [phone]    Negin Leigh on 6/14/2022 at 12:44 PM

## 2022-06-15 NOTE — TELEPHONE ENCOUNTER
PT/OT/Home Care order ok relayed.  Negin Turcios CMA([facility])..................6/15/2022   10:13 AM

## 2022-06-17 ENCOUNTER — ANTICOAGULATION THERAPY VISIT (OUTPATIENT)
Dept: ANTICOAGULATION | Facility: OTHER | Age: 67
End: 2022-06-17
Attending: INTERNAL MEDICINE
Payer: COMMERCIAL

## 2022-06-17 DIAGNOSIS — Z86.74 HISTORY OF CARDIAC ARREST: ICD-10-CM

## 2022-06-17 DIAGNOSIS — I25.2 HISTORY OF ST ELEVATION MYOCARDIAL INFARCTION (STEMI): ICD-10-CM

## 2022-06-17 DIAGNOSIS — Z86.2 HISTORY OF ITP: ICD-10-CM

## 2022-06-17 DIAGNOSIS — I73.9 PAD (PERIPHERAL ARTERY DISEASE) (H): Primary | ICD-10-CM

## 2022-06-17 DIAGNOSIS — I25.10 ATHEROSCLEROSIS OF NATIVE CORONARY ARTERY OF NATIVE HEART WITHOUT ANGINA PECTORIS: ICD-10-CM

## 2022-06-17 DIAGNOSIS — Z86.73 HISTORY OF CEREBROVASCULAR ACCIDENT: ICD-10-CM

## 2022-06-17 LAB — INR (EXTERNAL): 2.4 (ref 0.9–1.1)

## 2022-06-17 NOTE — PROGRESS NOTES
ANTICOAGULATION MANAGEMENT     Miguel Parisi 66 year old male is on warfarin with therapeutic INR result. (Goal INR 2.0-3.0)    Recent labs: (last 7 days)     06/17/22  1314   INR 2.4*       ASSESSMENT       Source(s): Chart Review and Home Care/Facility Nurse       Warfarin doses taken: Warfarin taken as instructed    Diet: No new diet changes identified    New illness, injury, or hospitalization: No    Medication/supplement changes: starting cefdinir today x 10 days    Signs or symptoms of bleeding or clotting: No    Previous INR: Therapeutic last 2(+) visits    Additional findings: None       PLAN     Recommended plan for temporary change(s) affecting INR     Dosing Instructions: continue your current warfarin dose with next INR in 1 week       Summary  As of 6/17/2022    Next INR check:  6/24/2022             Telephone call with Heavenly mccarthy with [facility] home care/facility nurse who agrees to plan and repeated back plan correctly    Orders given to  Homecare nurse/facility to recheck    Education provided: None required    Plan made per [facility] anticoagulation protocol    Gissel Arechiga, RN  Anticoagulation Clinic  6/17/2022    _______________________________________________________________________     Anticoagulation Episode Summary     Current INR goal:  2.0-3.0   TTR:  76.9 % (10 mo)   Target end date:  8/10/2027   Send INR reminders to:  [facility]    Indications    PAD (peripheral artery disease) (H) [I73.9]  History of cerebrovascular accident on 11/16/2020 [Z86.73]  History of ITP on 4/16/2021 [Z86.2]  H/O STEMI on 4/15/2021 [I25.2]  History of cardiac arrest [Z86.74]  Atherosclerosis of native coronary artery of native heart without angina pectoris [I25.10]           Comments:  Homecare- [facility].         Anticoagulation Care Providers     Provider Role Specialty Phone number    Danii Ortiz NP Referring Internal Medicine [phone]

## 2022-06-21 ENCOUNTER — TELEPHONE (OUTPATIENT)
Dept: INTERNAL MEDICINE | Facility: OTHER | Age: 67
End: 2022-06-21

## 2022-06-21 NOTE — TELEPHONE ENCOUNTER
MERLINE Choi @ [facility] requesting verbal orders once a week for 4 weeks. Exercise, transfers and prosthetic planning. Aware pcp out today. Thank you.  Betzy Ojeda

## 2022-06-22 ENCOUNTER — TELEPHONE (OUTPATIENT)
Dept: INTERNAL MEDICINE | Facility: OTHER | Age: 67
End: 2022-06-22
Payer: COMMERCIAL

## 2022-06-22 DIAGNOSIS — Z48.01 ENCOUNTER FOR CHANGE OR REMOVAL OF SURGICAL WOUND DRESSING: Primary | ICD-10-CM

## 2022-06-22 PROCEDURE — G0179 MD RECERTIFICATION HHA PT: HCPCS | Performed by: INTERNAL MEDICINE

## 2022-06-22 NOTE — TELEPHONE ENCOUNTER
PT/OT/Home Care order ok relayed.  Negin Turcios CMA([facility])..................6/22/2022   11:59 AM

## 2022-06-24 ENCOUNTER — ANTICOAGULATION THERAPY VISIT (OUTPATIENT)
Dept: ANTICOAGULATION | Facility: OTHER | Age: 67
End: 2022-06-24
Attending: INTERNAL MEDICINE
Payer: COMMERCIAL

## 2022-06-24 DIAGNOSIS — Z86.2 HISTORY OF ITP: ICD-10-CM

## 2022-06-24 DIAGNOSIS — I25.2 HISTORY OF ST ELEVATION MYOCARDIAL INFARCTION (STEMI): ICD-10-CM

## 2022-06-24 DIAGNOSIS — I25.10 ATHEROSCLEROSIS OF NATIVE CORONARY ARTERY OF NATIVE HEART WITHOUT ANGINA PECTORIS: ICD-10-CM

## 2022-06-24 DIAGNOSIS — Z86.74 HISTORY OF CARDIAC ARREST: ICD-10-CM

## 2022-06-24 DIAGNOSIS — Z86.73 HISTORY OF CEREBROVASCULAR ACCIDENT: ICD-10-CM

## 2022-06-24 DIAGNOSIS — I73.9 PAD (PERIPHERAL ARTERY DISEASE) (H): Primary | ICD-10-CM

## 2022-06-24 LAB — INR (EXTERNAL): 1.5 (ref 0.9–1.1)

## 2022-06-24 NOTE — PROGRESS NOTES
ANTICOAGULATION MANAGEMENT     Miguel MEDRANO Admire 66 year old male is on warfarin with subtherapeutic INR result. (Goal INR 2.0-3.0)    Recent labs: (last 7 days)     06/24/22  1305   INR 1.5*       ASSESSMENT       Source(s): Chart Review and Home Care/Facility Nurse       Warfarin doses taken: Warfarin taken as instructed    Diet: No new diet changes identified    New illness, injury, or hospitalization: No    Medication/supplement changes: Ceftin started on 6/17 may increase risk of bleeding, but not expected to affect INR    Signs or symptoms of bleeding or clotting: No    Previous INR: Therapeutic last visit; previously outside of goal range    Additional findings: None       PLAN     Recommended plan for temporary change(s) affecting INR     Dosing Instructions: booster dose then continue your current warfarin dose with next INR in 1 week       Summary  As of 6/24/2022    Full warfarin instructions:  6/24: 4 mg; 6/25: 4 mg; Otherwise 4 mg every Mon; 2 mg all other days   Next INR check:  7/1/2022             Telephone call with Miguel who verbalizes understanding and agrees to plan    Orders given to  Homecare nurse/facility to recheck    Education provided: None required    Plan made per [facility] anticoagulation protocol    Radha Huggins, RN  Anticoagulation Clinic  6/24/2022    _______________________________________________________________________     Anticoagulation Episode Summary     Current INR goal:  2.0-3.0   TTR:  76.2 % (10.3 mo)   Target end date:  8/10/2027   Send INR reminders to:  [facility]    Indications    PAD (peripheral artery disease) (H) [I73.9]  History of cerebrovascular accident on 11/16/2020 [Z86.73]  History of ITP on 4/16/2021 [Z86.2]  H/O STEMI on 4/15/2021 [I25.2]  History of cardiac arrest [Z86.74]  Atherosclerosis of native coronary artery of native heart without angina pectoris [I25.10]           Comments:  Homecare- [facility].         Anticoagulation Care  Providers     Provider Role Specialty Phone number    Danii Ortiz NP Referring Internal Medicine [phone]

## 2022-06-27 NOTE — TELEPHONE ENCOUNTER
Dr Yin reviewed and completed the following home care or hospice form(s) for [facility] and Miguel Parisi on 6/27/22.   This covers the certification period effective 6/22/22 to 8/20/22.  Negin Turcios, TERRY on 6/27/2022 at 3:59 PM

## 2022-07-01 ENCOUNTER — ANTICOAGULATION THERAPY VISIT (OUTPATIENT)
Dept: ANTICOAGULATION | Facility: OTHER | Age: 67
End: 2022-07-01
Attending: INTERNAL MEDICINE
Payer: COMMERCIAL

## 2022-07-01 DIAGNOSIS — I25.2 HISTORY OF ST ELEVATION MYOCARDIAL INFARCTION (STEMI): ICD-10-CM

## 2022-07-01 DIAGNOSIS — Z79.01 ANTICOAGULATION MONITORING, INR RANGE 2-3: ICD-10-CM

## 2022-07-01 DIAGNOSIS — I25.10 ATHEROSCLEROSIS OF NATIVE CORONARY ARTERY OF NATIVE HEART WITHOUT ANGINA PECTORIS: ICD-10-CM

## 2022-07-01 DIAGNOSIS — Z86.74 HISTORY OF CARDIAC ARREST: ICD-10-CM

## 2022-07-01 DIAGNOSIS — Z86.73 HISTORY OF CEREBROVASCULAR ACCIDENT: Primary | ICD-10-CM

## 2022-07-01 DIAGNOSIS — Z86.2 HISTORY OF ITP: ICD-10-CM

## 2022-07-01 DIAGNOSIS — I73.9 PAD (PERIPHERAL ARTERY DISEASE) (H): ICD-10-CM

## 2022-07-01 LAB — INR (EXTERNAL): 2.1 (ref 0.9–1.1)

## 2022-07-01 NOTE — PROGRESS NOTES
ANTICOAGULATION MANAGEMENT     Miguel Parisi 66 year old male is on warfarin with therapeutic INR result. (Goal INR 2.0-3.0)    Recent labs: (last 7 days)     07/01/22  1348   INR 2.1*       ASSESSMENT       Source(s): Chart Review and Home Care/Facility Nurse       Warfarin doses taken: Warfarin taken as instructed    Diet: No new diet changes identified    New illness, injury, or hospitalization: No    Medication/supplement changes: None noted    Signs or symptoms of bleeding or clotting: No    Previous INR: Subtherapeutic    Additional findings: None       PLAN     Recommended plan for no diet, medication or health factor changes affecting INR     Dosing Instructions: continue your current warfarin dose with next INR in 2 weeks       Summary  As of 7/1/2022    Full warfarin instructions:  4 mg every Mon, Fri; 2 mg all other days   Next INR check:  7/15/2022             Telephone call with Heavenly mccarthy with [facility] home care/facility nurse who verbalizes understanding and agrees to plan    Orders given to  Homecare nurse/facility to recheck    Education provided: None required    Plan made per [facility] anticoagulation protocol    Gissel Arechiga, RN  Anticoagulation Clinic  7/1/2022    _______________________________________________________________________     Anticoagulation Episode Summary     Current INR goal:  2.0-3.0   TTR:  74.9 % (10.5 mo)   Target end date:  8/10/2027   Send INR reminders to:  [facility]    Indications    PAD (peripheral artery disease) (H) [I73.9]  History of cerebrovascular accident on 11/16/2020 [Z86.73]  History of ITP on 4/16/2021 [Z86.2]  H/O STEMI on 4/15/2021 [I25.2]  History of cardiac arrest [Z86.74]  Atherosclerosis of native coronary artery of native heart without angina pectoris [I25.10]  Anticoagulation monitoring  INR range 2-3 [Z79.01]           Comments:  Homecare- [facility].         Anticoagulation Care Providers     Provider Role Specialty Phone  number    Danii Ortiz NP Referring Internal Medicine [phone]    Yari Yin DO Referring Internal Medicine [phone]

## 2022-07-15 ENCOUNTER — ANTICOAGULATION THERAPY VISIT (OUTPATIENT)
Dept: ANTICOAGULATION | Facility: OTHER | Age: 67
End: 2022-07-15
Attending: INTERNAL MEDICINE
Payer: COMMERCIAL

## 2022-07-15 DIAGNOSIS — I25.10 ATHEROSCLEROSIS OF NATIVE CORONARY ARTERY OF NATIVE HEART WITHOUT ANGINA PECTORIS: ICD-10-CM

## 2022-07-15 DIAGNOSIS — Z79.01 ANTICOAGULATION MONITORING, INR RANGE 2-3: ICD-10-CM

## 2022-07-15 DIAGNOSIS — Z86.2 HISTORY OF ITP: ICD-10-CM

## 2022-07-15 DIAGNOSIS — I25.2 HISTORY OF ST ELEVATION MYOCARDIAL INFARCTION (STEMI): ICD-10-CM

## 2022-07-15 DIAGNOSIS — Z86.73 HISTORY OF CEREBROVASCULAR ACCIDENT: ICD-10-CM

## 2022-07-15 DIAGNOSIS — I73.9 PAD (PERIPHERAL ARTERY DISEASE) (H): Primary | ICD-10-CM

## 2022-07-15 DIAGNOSIS — Z86.74 HISTORY OF CARDIAC ARREST: ICD-10-CM

## 2022-07-15 LAB — INR (EXTERNAL): 1.8 (ref 0.9–1.1)

## 2022-07-15 NOTE — PROGRESS NOTES
ANTICOAGULATION MANAGEMENT     Miguel Parisi 66 year old male is on warfarin with subtherapeutic INR result. (Goal INR 2.0-3.0)    Recent labs: (last 7 days)     07/15/22  1130   INR 1.8*       ASSESSMENT       Source(s): Chart Review and Home Care/Facility Nurse       Warfarin doses taken: Warfarin taken as instructed    Diet: No new diet changes identified    New illness, injury, or hospitalization: No    Medication/supplement changes: None noted    Signs or symptoms of bleeding or clotting: No    Previous INR: Therapeutic last visit; previously outside of goal range    Additional findings: None       PLAN     Recommended plan for no diet, medication or health factor changes affecting INR     Dosing Instructions: continue your current warfarin dose with next INR in 1 week       Summary  As of 7/15/2022    Full warfarin instructions:  4 mg every Mon, Fri; 2 mg all other days   Next INR check:  7/22/2022             Telephone call with Miguel who verbalizes understanding and agrees to plan    Orders given to  Homecare nurse/facility to recheck    Education provided: None required    Plan made per [facility] anticoagulation protocol    Radha Huggins RN  Anticoagulation Clinic  7/15/2022    _______________________________________________________________________     Anticoagulation Episode Summary     Current INR goal:  2.0-3.0   TTR:  73.2 % (11 mo)   Target end date:  8/10/2027   Send INR reminders to:  [facility]    Indications    PAD (peripheral artery disease) (H) [I73.9]  History of cerebrovascular accident on 11/16/2020 [Z86.73]  History of ITP on 4/16/2021 [Z86.2]  H/O STEMI on 4/15/2021 [I25.2]  History of cardiac arrest [Z86.74]  Atherosclerosis of native coronary artery of native heart without angina pectoris [I25.10]  Anticoagulation monitoring  INR range 2-3 [Z79.01]           Comments:  Homecare- Pending sale to [facility].         Anticoagulation Care Providers     Provider Role Specialty Phone number     Danii Ortiz NP Referring Internal Medicine [phone]    Yari Yin DO Referring Internal Medicine [phone]

## 2022-07-22 ENCOUNTER — TELEPHONE (OUTPATIENT)
Dept: INTERNAL MEDICINE | Facility: OTHER | Age: 67
End: 2022-07-22

## 2022-07-22 ENCOUNTER — ANTICOAGULATION THERAPY VISIT (OUTPATIENT)
Dept: ANTICOAGULATION | Facility: OTHER | Age: 67
End: 2022-07-22
Attending: INTERNAL MEDICINE
Payer: COMMERCIAL

## 2022-07-22 DIAGNOSIS — I73.9 PAD (PERIPHERAL ARTERY DISEASE) (H): Primary | ICD-10-CM

## 2022-07-22 DIAGNOSIS — Z79.01 ANTICOAGULATION MONITORING, INR RANGE 2-3: ICD-10-CM

## 2022-07-22 DIAGNOSIS — Z44.9: ICD-10-CM

## 2022-07-22 DIAGNOSIS — I25.10 ATHEROSCLEROSIS OF NATIVE CORONARY ARTERY OF NATIVE HEART WITHOUT ANGINA PECTORIS: ICD-10-CM

## 2022-07-22 DIAGNOSIS — Z86.2 HISTORY OF ITP: ICD-10-CM

## 2022-07-22 DIAGNOSIS — Z86.74 HISTORY OF CARDIAC ARREST: ICD-10-CM

## 2022-07-22 DIAGNOSIS — Z89.512 HX OF BKA, LEFT (H): ICD-10-CM

## 2022-07-22 DIAGNOSIS — Z86.73 HISTORY OF CEREBROVASCULAR ACCIDENT: ICD-10-CM

## 2022-07-22 DIAGNOSIS — I25.2 HISTORY OF ST ELEVATION MYOCARDIAL INFARCTION (STEMI): ICD-10-CM

## 2022-07-22 LAB — INR (EXTERNAL): 1.6 (ref 0.9–1.1)

## 2022-07-22 NOTE — PROGRESS NOTES
ANTICOAGULATION MANAGEMENT     Miguel Parisi 66 year old male is on warfarin with subtherapeutic INR result. (Goal INR 2.0-3.0)    Recent labs: (last 7 days)     07/22/22  1328   INR 1.6*       ASSESSMENT       Source(s): Chart Review and Home Care/Facility Nurse       Warfarin doses taken: Warfarin taken as instructed    Diet: No new diet changes identified    New illness, injury, or hospitalization: No    Medication/supplement changes: None noted    Signs or symptoms of bleeding or clotting: No    Previous INR: Subtherapeutic    Additional findings: None       PLAN     Recommended plan for no diet, medication or health factor changes affecting INR     Dosing Instructions: Increase your warfarin dose (22.2% change) with next INR in 1 week       Summary  As of 7/22/2022    Full warfarin instructions:  2 mg every Mon, Wed, Fri; 4 mg all other days   Next INR check:  7/29/2022             Telephone call with [facility] home care/facility nurse who verbalizes understanding and agrees to plan    Lab visit scheduled    Education provided: None required    Plan made per [facility] anticoagulation protocol    Radha Huggins RN  Anticoagulation Clinic  7/22/2022    _______________________________________________________________________     Anticoagulation Episode Summary     Current INR goal:  2.0-3.0   TTR:  71.6 % (11.2 mo)   Target end date:  8/10/2027   Send INR reminders to:  [facility]    Indications    PAD (peripheral artery disease) (H) [I73.9]  History of cerebrovascular accident on 11/16/2020 [Z86.73]  History of ITP on 4/16/2021 [Z86.2]  H/O STEMI on 4/15/2021 [I25.2]  History of cardiac arrest [Z86.74]  Atherosclerosis of native coronary artery of native heart without angina pectoris [I25.10]  Anticoagulation monitoring  INR range 2-3 [Z79.01]           Comments:  Homecare- [facility].         Anticoagulation Care Providers     Provider Role Specialty Phone number    Danii Ortiz NP Referring  Internal Medicine [phone]    Yari Yin DO Referring Internal Medicine [phone]

## 2022-07-22 NOTE — TELEPHONE ENCOUNTER
They need orders for outpatient PT for evaluation and treatment.  Send to [facility] PT.. Dx: left [facility] with new prothesis    Poornima Jackson on 7/22/2022 at 1:30 PM

## 2022-07-29 ENCOUNTER — ANTICOAGULATION THERAPY VISIT (OUTPATIENT)
Dept: ANTICOAGULATION | Facility: OTHER | Age: 67
End: 2022-07-29
Attending: INTERNAL MEDICINE
Payer: COMMERCIAL

## 2022-07-29 DIAGNOSIS — Z86.74 HISTORY OF CARDIAC ARREST: ICD-10-CM

## 2022-07-29 DIAGNOSIS — Z86.2 HISTORY OF ITP: ICD-10-CM

## 2022-07-29 DIAGNOSIS — I25.10 ATHEROSCLEROSIS OF NATIVE CORONARY ARTERY OF NATIVE HEART WITHOUT ANGINA PECTORIS: ICD-10-CM

## 2022-07-29 DIAGNOSIS — I73.9 PAD (PERIPHERAL ARTERY DISEASE) (H): Primary | ICD-10-CM

## 2022-07-29 DIAGNOSIS — I25.2 HISTORY OF ST ELEVATION MYOCARDIAL INFARCTION (STEMI): ICD-10-CM

## 2022-07-29 DIAGNOSIS — Z86.73 HISTORY OF CEREBROVASCULAR ACCIDENT: ICD-10-CM

## 2022-07-29 DIAGNOSIS — Z79.01 ANTICOAGULATION MONITORING, INR RANGE 2-3: ICD-10-CM

## 2022-07-29 LAB — INR (EXTERNAL): 1.7 (ref 0.9–1.1)

## 2022-07-29 NOTE — PROGRESS NOTES
ANTICOAGULATION MANAGEMENT     Miguel Parisi 66 year old male is on warfarin with subtherapeutic INR result. (Goal INR 2.0-3.0)    Recent labs: (last 7 days)     07/29/22  1337   INR 1.7*       ASSESSMENT       Source(s): Chart Review and Home Care/Facility Nurse       Warfarin doses taken: Warfarin taken as instructed    Diet: No new diet changes identified    New illness, injury, or hospitalization: No    Medication/supplement changes: None noted    Signs or symptoms of bleeding or clotting: No    Previous INR: Subtherapeutic    Additional findings: None       PLAN     Recommended plan for no diet, medication or health factor changes affecting INR     Dosing Instructions: Increase your warfarin dose (18.2% change) with next INR in 1 week       Summary  As of 7/29/2022    Full warfarin instructions:  2 mg every Wed; 4 mg all other days   Next INR check:  8/5/2022             Telephone call with Heavenly mccarthy with [facility] home care/facility nurse who agrees to plan and repeated back plan correctly    Orders given to  Homecare nurse/facility to recheck    Education provided: None required    Plan made per [facility] anticoagulation protocol    Gissel Arechiga, RN  Anticoagulation Clinic  7/29/2022    _______________________________________________________________________     Anticoagulation Episode Summary     Current INR goal:  2.0-3.0   TTR:  70.1 % (11.4 mo)   Target end date:  8/10/2027   Send INR reminders to:  [facility]    Indications    PAD (peripheral artery disease) (H) [I73.9]  History of cerebrovascular accident on 11/16/2020 [Z86.73]  History of ITP on 4/16/2021 [Z86.2]  H/O STEMI on 4/15/2021 [I25.2]  History of cardiac arrest [Z86.74]  Atherosclerosis of native coronary artery of native heart without angina pectoris [I25.10]  Anticoagulation monitoring  INR range 2-3 [Z79.01]           Comments:  Homecare- [facility].         Anticoagulation Care Providers     Provider Role  Specialty Phone number    Danii Ortiz NP Referring Internal Medicine [phone]    Yari Yin DO Referring Internal Medicine [phone]

## 2022-08-05 ENCOUNTER — ANTICOAGULATION THERAPY VISIT (OUTPATIENT)
Dept: ANTICOAGULATION | Facility: OTHER | Age: 67
End: 2022-08-05
Attending: INTERNAL MEDICINE
Payer: COMMERCIAL

## 2022-08-05 DIAGNOSIS — I25.10 ATHEROSCLEROSIS OF NATIVE CORONARY ARTERY OF NATIVE HEART WITHOUT ANGINA PECTORIS: ICD-10-CM

## 2022-08-05 DIAGNOSIS — I25.2 HISTORY OF ST ELEVATION MYOCARDIAL INFARCTION (STEMI): ICD-10-CM

## 2022-08-05 DIAGNOSIS — I73.9 PAD (PERIPHERAL ARTERY DISEASE) (H): Primary | ICD-10-CM

## 2022-08-05 DIAGNOSIS — Z86.74 HISTORY OF CARDIAC ARREST: ICD-10-CM

## 2022-08-05 DIAGNOSIS — Z86.73 HISTORY OF CEREBROVASCULAR ACCIDENT: ICD-10-CM

## 2022-08-05 DIAGNOSIS — Z79.01 ANTICOAGULATION MONITORING, INR RANGE 2-3: ICD-10-CM

## 2022-08-05 DIAGNOSIS — Z86.2 HISTORY OF ITP: ICD-10-CM

## 2022-08-05 PROCEDURE — 85610 PROTHROMBIN TIME: CPT | Mod: ZL,QW

## 2022-08-05 PROCEDURE — 36416 COLLJ CAPILLARY BLOOD SPEC: CPT | Mod: ZL

## 2022-08-08 LAB — INR POINT OF CARE: 2 (ref 0.9–1.1)

## 2022-08-08 NOTE — PROGRESS NOTES
ANTICOAGULATION MANAGEMENT     Miguel Parisi 66 year old male is on warfarin with therapeutic INR result. (Goal INR 2.0-3.0)    Recent labs: (last 7 days)     08/05/22  1100   INR 2.0*       ASSESSMENT       Source(s): Chart Review and Home Care/Facility Nurse       Warfarin doses taken: Warfarin taken as instructed    Diet: No new diet changes identified    New illness, injury, or hospitalization: No    Medication/supplement changes: None noted    Signs or symptoms of bleeding or clotting: No    Previous INR: Subtherapeutic    Additional findings: None       PLAN     Recommended plan for no diet, medication or health factor changes affecting INR     Dosing Instructions: Continue your current warfarin dose with next INR in 1 week       Summary  As of 8/5/2022    Full warfarin instructions:  2 mg every Wed; 4 mg all other days   Next INR check:  8/12/2022             Telephone call with [facility] home care/facility nurse who verbalizes understanding and agrees to plan    Orders given to  Homecare nurse/facility to recheck    Education provided: None required    Plan made per [facility] anticoagulation protocol    Radha Huggins RN  Anticoagulation Clinic  8/8/2022    _______________________________________________________________________     Anticoagulation Episode Summary     Current INR goal:  2.0-3.0   TTR:  68.8 % (11.7 mo)   Target end date:  8/10/2027   Send INR reminders to:  [facility]    Indications    PAD (peripheral artery disease) (H) [I73.9]  History of cerebrovascular accident on 11/16/2020 [Z86.73]  History of ITP on 4/16/2021 [Z86.2]  H/O STEMI on 4/15/2021 [I25.2]  History of cardiac arrest [Z86.74]  Atherosclerosis of native coronary artery of native heart without angina pectoris [I25.10]  Anticoagulation monitoring  INR range 2-3 [Z79.01]           Comments:  [facility].         Anticoagulation Care Providers     Provider Role Specialty Phone number    Danii Ortiz NP  Referring Internal Medicine [phone]    Yari Yin DO Referring Internal Medicine [phone]

## 2022-08-12 ENCOUNTER — ANTICOAGULATION THERAPY VISIT (OUTPATIENT)
Dept: ANTICOAGULATION | Facility: OTHER | Age: 67
End: 2022-08-12
Attending: INTERNAL MEDICINE
Payer: COMMERCIAL

## 2022-08-12 DIAGNOSIS — I25.2 HISTORY OF ST ELEVATION MYOCARDIAL INFARCTION (STEMI): ICD-10-CM

## 2022-08-12 DIAGNOSIS — Z86.2 HISTORY OF ITP: ICD-10-CM

## 2022-08-12 DIAGNOSIS — Z86.74 HISTORY OF CARDIAC ARREST: ICD-10-CM

## 2022-08-12 DIAGNOSIS — I25.10 ATHEROSCLEROSIS OF NATIVE CORONARY ARTERY OF NATIVE HEART WITHOUT ANGINA PECTORIS: ICD-10-CM

## 2022-08-12 DIAGNOSIS — I73.9 PAD (PERIPHERAL ARTERY DISEASE) (H): Primary | ICD-10-CM

## 2022-08-12 DIAGNOSIS — Z86.73 HISTORY OF CEREBROVASCULAR ACCIDENT: ICD-10-CM

## 2022-08-12 DIAGNOSIS — Z79.01 ANTICOAGULATION MONITORING, INR RANGE 2-3: ICD-10-CM

## 2022-08-12 LAB — INR (EXTERNAL): 2.8 (ref 0.9–1.1)

## 2022-08-12 NOTE — PROGRESS NOTES
ANTICOAGULATION MANAGEMENT     Miguel Parisi 66 year old male is on warfarin with therapeutic INR result. (Goal INR 2.0-3.0)    Recent labs: (last 7 days)     08/12/22  0946   INR 2.8*       ASSESSMENT       Source(s): Chart Review and Home Care/Facility Nurse       Warfarin doses taken: Warfarin taken as instructed    Diet: No new diet changes identified    New illness, injury, or hospitalization: No    Medication/supplement changes: None noted    Signs or symptoms of bleeding or clotting: No    Previous INR: Therapeutic last visit; previously outside of goal range    Additional findings: None       PLAN     Recommended plan for no diet, medication or health factor changes affecting INR     Dosing Instructions: Continue your current warfarin dose with next INR in 2 weeks       Summary  As of 8/12/2022    Full warfarin instructions:  2 mg every Wed; 4 mg all other days   Next INR check:  8/25/2022             Telephone call with Lynda mccarthy with [facility] home care/facility nurse who verbalizes understanding and agrees to plan    Lab visit scheduled    Education provided: patient to discharge from homecare today    Plan made per [facility] anticoagulation protocol    Gissel Arechiga RN  Anticoagulation Clinic  8/12/2022    _______________________________________________________________________     Anticoagulation Episode Summary     Current INR goal:  2.0-3.0   TTR:  69.4 % (11.9 mo)   Target end date:  8/10/2027   Send INR reminders to:  [facility]    Indications    PAD (peripheral artery disease) (H) [I73.9]  History of cerebrovascular accident on 11/16/2020 [Z86.73]  History of ITP on 4/16/2021 [Z86.2]  H/O STEMI on 4/15/2021 [I25.2]  History of cardiac arrest [Z86.74]  Atherosclerosis of native coronary artery of native heart without angina pectoris [I25.10]  Anticoagulation monitoring  INR range 2-3 [Z79.01]           Comments:  Homecare- [facility].         Anticoagulation Care Providers      Provider Role Specialty Phone number    Danii Ortiz NP Referring Internal Medicine [phone]    Yari Yin DO Referring Internal Medicine [phone]

## 2022-08-19 ENCOUNTER — TELEPHONE (OUTPATIENT)
Dept: INTERNAL MEDICINE | Facility: OTHER | Age: 67
End: 2022-08-19

## 2022-08-19 NOTE — TELEPHONE ENCOUNTER
Verbal orders for skilled nursing one time a week for four weeks.    Roxy Shepherd on 8/19/2022 at 11:56 AM

## 2022-08-19 NOTE — TELEPHONE ENCOUNTER
Kandy at [facility] notified.  Cris Alexander LPN .............8/19/2022     4:21 PM

## 2022-08-21 ENCOUNTER — TELEPHONE (OUTPATIENT)
Dept: INTERNAL MEDICINE | Facility: OTHER | Age: 67
End: 2022-08-21
Payer: COMMERCIAL

## 2022-08-21 DIAGNOSIS — Z48.01 ENCOUNTER FOR CHANGE OR REMOVAL OF SURGICAL WOUND DRESSING: Primary | ICD-10-CM

## 2022-08-21 PROCEDURE — G0179 MD RECERTIFICATION HHA PT: HCPCS | Performed by: INTERNAL MEDICINE

## 2022-08-25 ENCOUNTER — ANTICOAGULATION THERAPY VISIT (OUTPATIENT)
Dept: ANTICOAGULATION | Facility: OTHER | Age: 67
End: 2022-08-25
Attending: INTERNAL MEDICINE
Payer: COMMERCIAL

## 2022-08-25 DIAGNOSIS — Z86.73 HISTORY OF CEREBROVASCULAR ACCIDENT: ICD-10-CM

## 2022-08-25 DIAGNOSIS — I25.2 HISTORY OF ST ELEVATION MYOCARDIAL INFARCTION (STEMI): ICD-10-CM

## 2022-08-25 DIAGNOSIS — Z86.74 HISTORY OF CARDIAC ARREST: ICD-10-CM

## 2022-08-25 DIAGNOSIS — Z86.2 HISTORY OF ITP: ICD-10-CM

## 2022-08-25 DIAGNOSIS — I25.10 ATHEROSCLEROSIS OF NATIVE CORONARY ARTERY OF NATIVE HEART WITHOUT ANGINA PECTORIS: ICD-10-CM

## 2022-08-25 DIAGNOSIS — I73.9 PAD (PERIPHERAL ARTERY DISEASE) (H): Primary | ICD-10-CM

## 2022-08-25 DIAGNOSIS — Z79.01 ANTICOAGULATION MONITORING, INR RANGE 2-3: ICD-10-CM

## 2022-08-25 LAB — INR POINT OF CARE: 4.1 (ref 0.9–1.1)

## 2022-08-25 PROCEDURE — 85610 PROTHROMBIN TIME: CPT | Mod: ZL,QW

## 2022-08-25 PROCEDURE — 36416 COLLJ CAPILLARY BLOOD SPEC: CPT | Mod: ZL

## 2022-08-25 NOTE — PROGRESS NOTES
ANTICOAGULATION MANAGEMENT     Miguel Parisi 66 year old male is on warfarin with supratherapeutic INR result. (Goal INR 2.0-3.0)    Recent labs: (last 7 days)     08/25/22  1303   INR 4.1*       ASSESSMENT       Source(s): Chart Review       Warfarin doses taken: Warfarin taken as instructed    Diet: No new diet changes identified    New illness, injury, or hospitalization: No    Medication/supplement changes: None noted    Signs or symptoms of bleeding or clotting: No    Previous INR: Therapeutic last 2(+) visits    Additional findings: None       PLAN     Recommended plan for no diet, medication or health factor changes affecting INR     Dosing Instructions: hold dose for today and reduce dose tomorrow to 2mg then continue your current warfarin dose with next INR in 2 weeks       Summary  As of 8/25/2022    Full warfarin instructions:  8/25: Hold; 8/26: 2 mg; Otherwise 2 mg every Wed; 4 mg all other days   Next INR check:  9/8/2022             Discussed in clinic    Lab visit scheduled    Education provided: Please call back if any changes to your diet, medications or how you've been taking warfarin and Monitoring for bleeding signs and symptoms    Plan made per [facility] anticoagulation protocol    Michael Ramos RN  Anticoagulation Clinic  8/25/2022    _______________________________________________________________________     Anticoagulation Episode Summary     Current INR goal:  2.0-3.0   TTR:  67.5 % (1 y)   Target end date:  8/10/2027   Send INR reminders to:  [facility]    Indications    PAD (peripheral artery disease) (H) [I73.9]  History of cerebrovascular accident on 11/16/2020 [Z86.73]  History of ITP on 4/16/2021 [Z86.2]  H/O STEMI on 4/15/2021 [I25.2]  History of cardiac arrest [Z86.74]  Atherosclerosis of native coronary artery of native heart without angina pectoris [I25.10]  Anticoagulation monitoring  INR range 2-3 [Z79.01]           Comments:  [facility].          Anticoagulation Care Providers     Provider Role Specialty Phone number    Danii Ortiz NP Referring Internal Medicine [phone]    Yari Yin DO Referring Internal Medicine [phone]

## 2022-09-06 NOTE — TELEPHONE ENCOUNTER
Dr Yin reviewed and completed the following home care or hospice form(s) for Miguel Parisi and [facility] on 9/6/22.   This covers the certification period effective 8/21/22 to 10/19/22.  Negin Turcios, TERRY on 9/6/2022 at 5:17 PM

## 2022-09-09 ENCOUNTER — ANTICOAGULATION THERAPY VISIT (OUTPATIENT)
Dept: ANTICOAGULATION | Facility: OTHER | Age: 67
End: 2022-09-09
Payer: COMMERCIAL

## 2022-09-09 DIAGNOSIS — Z86.2 HISTORY OF ITP: ICD-10-CM

## 2022-09-09 DIAGNOSIS — Z79.01 ANTICOAGULATION MONITORING, INR RANGE 2-3: ICD-10-CM

## 2022-09-09 DIAGNOSIS — I73.9 PAD (PERIPHERAL ARTERY DISEASE) (H): Primary | ICD-10-CM

## 2022-09-09 DIAGNOSIS — Z86.73 HISTORY OF CEREBROVASCULAR ACCIDENT: ICD-10-CM

## 2022-09-09 DIAGNOSIS — Z86.74 HISTORY OF CARDIAC ARREST: ICD-10-CM

## 2022-09-09 DIAGNOSIS — I25.2 HISTORY OF ST ELEVATION MYOCARDIAL INFARCTION (STEMI): ICD-10-CM

## 2022-09-09 DIAGNOSIS — I25.10 ATHEROSCLEROSIS OF NATIVE CORONARY ARTERY OF NATIVE HEART WITHOUT ANGINA PECTORIS: ICD-10-CM

## 2022-09-09 LAB — INR (EXTERNAL): 3.1 (ref 0.9–1.1)

## 2022-09-09 NOTE — PROGRESS NOTES
ANTICOAGULATION MANAGEMENT     Miguel Parisi 66 year old male is on warfarin with supratherapeutic INR result. (Goal INR 2.0-3.0)    Recent labs: (last 7 days)     09/09/22  1309   INR 3.1*       ASSESSMENT       Source(s): Chart Review and Home Care/Facility Nurse       Warfarin doses taken: Warfarin taken as instructed    Diet: No new diet changes identified    New illness, injury, or hospitalization: No    Medication/supplement changes: None noted    Signs or symptoms of bleeding or clotting: No    Previous INR: Supratherapeutic    Additional findings: None       PLAN     Recommended plan for no diet, medication or health factor changes affecting INR     Dosing Instructions: Continue your current warfarin dose with next INR in 2 weeks       Summary  As of 9/9/2022    Full warfarin instructions:  2 mg every Wed; 4 mg all other days   Next INR check:  9/23/2022             Telephone call with Dev Venegas  home care nurse who agrees to plan and repeated back plan correctly    Orders given to  Homecare nurse/facility to recheck    Education provided: None required    Plan made per [facility] anticoagulation protocol    Gissel Arechiga RN  Anticoagulation Clinic  9/9/2022    _______________________________________________________________________     Anticoagulation Episode Summary     Current INR goal:  2.0-3.0   TTR:  63.8 % (1 y)   Target end date:  8/10/2027   Send INR reminders to:  [facility]    Indications    PAD (peripheral artery disease) (H) [I73.9]  History of cerebrovascular accident on 11/16/2020 [Z86.73]  History of ITP on 4/16/2021 [Z86.2]  H/O STEMI on 4/15/2021 [I25.2]  History of cardiac arrest [Z86.74]  Atherosclerosis of native coronary artery of native heart without angina pectoris [I25.10]  Anticoagulation monitoring  INR range 2-3 [Z79.01]           Comments:  Homecare- [facility].         Anticoagulation Care Providers     Provider Role Specialty Phone number    Diana,  MOY Foy Referring Internal Medicine [phone]    Yari Yin DO Referring Internal Medicine [phone]

## 2022-09-13 ENCOUNTER — HOSPITAL ENCOUNTER (OUTPATIENT)
Dept: PHYSICAL THERAPY | Facility: OTHER | Age: 67
Setting detail: THERAPIES SERIES
Discharge: HOME | End: 2022-09-13
Attending: INTERNAL MEDICINE
Payer: COMMERCIAL

## 2022-09-13 DIAGNOSIS — Z44.9: ICD-10-CM

## 2022-09-13 DIAGNOSIS — Z89.512 HX OF BKA, LEFT (H): ICD-10-CM

## 2022-09-13 DIAGNOSIS — I73.9 PAD (PERIPHERAL ARTERY DISEASE) (H): ICD-10-CM

## 2022-09-13 PROCEDURE — 97110 THERAPEUTIC EXERCISES: CPT | Mod: GP,PO

## 2022-09-13 PROCEDURE — 97162 PT EVAL MOD COMPLEX 30 MIN: CPT | Mod: GP,PO

## 2022-09-17 ENCOUNTER — HEALTH MAINTENANCE LETTER (OUTPATIENT)
Age: 67
End: 2022-09-17

## 2022-09-18 ENCOUNTER — TELEPHONE (OUTPATIENT)
Dept: INTERNAL MEDICINE | Facility: OTHER | Age: 67
End: 2022-09-18

## 2022-09-18 NOTE — TELEPHONE ENCOUNTER
I was contacted by physical therapy who recommended that patient see [facility] orthotics for a shoe filler and diabetic shoes after his recent transmetatarsal amputation.     This now requires documentation in a referral with a face-to-face visit.    Please call patient to schedule this with myself or Danii Ortiz in the next week or 2.

## 2022-09-19 NOTE — TELEPHONE ENCOUNTER
After proper verification, patient notified. Transferred to the appointment desk to schedule.  Negin Turcios CMA([facility])..................9/19/2022   11:55 AM

## 2022-09-20 ENCOUNTER — HOSPITAL ENCOUNTER (OUTPATIENT)
Dept: PHYSICAL THERAPY | Facility: OTHER | Age: 67
Setting detail: THERAPIES SERIES
Discharge: HOME | End: 2022-09-20
Attending: INTERNAL MEDICINE
Payer: COMMERCIAL

## 2022-09-20 PROCEDURE — 97116 GAIT TRAINING THERAPY: CPT | Mod: GP,PO

## 2022-09-20 PROCEDURE — 97110 THERAPEUTIC EXERCISES: CPT | Mod: GP,PO

## 2022-09-22 ENCOUNTER — HOSPITAL ENCOUNTER (OUTPATIENT)
Dept: PHYSICAL THERAPY | Facility: OTHER | Age: 67
Setting detail: THERAPIES SERIES
Discharge: HOME | End: 2022-09-22
Attending: INTERNAL MEDICINE
Payer: COMMERCIAL

## 2022-09-22 PROCEDURE — 97110 THERAPEUTIC EXERCISES: CPT | Mod: GP,PO

## 2022-09-22 PROCEDURE — 97116 GAIT TRAINING THERAPY: CPT | Mod: GP,PO

## 2022-09-27 ENCOUNTER — HOSPITAL ENCOUNTER (OUTPATIENT)
Dept: PHYSICAL THERAPY | Facility: OTHER | Age: 67
Setting detail: THERAPIES SERIES
Discharge: HOME | End: 2022-09-27
Attending: INTERNAL MEDICINE
Payer: COMMERCIAL

## 2022-09-27 PROCEDURE — 97110 THERAPEUTIC EXERCISES: CPT | Mod: GP,PO

## 2022-09-27 PROCEDURE — 97116 GAIT TRAINING THERAPY: CPT | Mod: GP,PO

## 2022-09-28 ENCOUNTER — OFFICE VISIT (OUTPATIENT)
Dept: INTERNAL MEDICINE | Facility: OTHER | Age: 67
End: 2022-09-28
Attending: NURSE PRACTITIONER
Payer: COMMERCIAL

## 2022-09-28 ENCOUNTER — APPOINTMENT (OUTPATIENT)
Dept: ANTICOAGULATION | Facility: OTHER | Age: 67
End: 2022-09-28
Attending: INTERNAL MEDICINE
Payer: COMMERCIAL

## 2022-09-28 VITALS
DIASTOLIC BLOOD PRESSURE: 72 MMHG | OXYGEN SATURATION: 96 % | SYSTOLIC BLOOD PRESSURE: 132 MMHG | HEART RATE: 73 BPM | RESPIRATION RATE: 14 BRPM | TEMPERATURE: 97.3 F

## 2022-09-28 DIAGNOSIS — I69.398 ABNORMALITY OF GAIT AS LATE EFFECT OF CEREBROVASCULAR ACCIDENT (CVA): ICD-10-CM

## 2022-09-28 DIAGNOSIS — I69.354 HEMIPLEGIA AND HEMIPARESIS FOLLOWING CEREBRAL INFARCTION AFFECTING LEFT NON-DOMINANT SIDE (H): ICD-10-CM

## 2022-09-28 DIAGNOSIS — E11.29 TYPE 2 DIABETES MELLITUS WITH MICROALBUMINURIA, WITH LONG-TERM CURRENT USE OF INSULIN (H): Primary | ICD-10-CM

## 2022-09-28 DIAGNOSIS — R26.9 ABNORMALITY OF GAIT AS LATE EFFECT OF CEREBROVASCULAR ACCIDENT (CVA): ICD-10-CM

## 2022-09-28 DIAGNOSIS — R80.9 TYPE 2 DIABETES MELLITUS WITH MICROALBUMINURIA, WITH LONG-TERM CURRENT USE OF INSULIN (H): Primary | ICD-10-CM

## 2022-09-28 DIAGNOSIS — I73.9 INTERMITTENT CLAUDICATION (H): ICD-10-CM

## 2022-09-28 DIAGNOSIS — S98.131A AMPUTATION OF TOE OF RIGHT FOOT (H): ICD-10-CM

## 2022-09-28 DIAGNOSIS — E66.01 MORBID OBESITY (H): ICD-10-CM

## 2022-09-28 DIAGNOSIS — Z79.4 TYPE 2 DIABETES MELLITUS WITH MICROALBUMINURIA, WITH LONG-TERM CURRENT USE OF INSULIN (H): Primary | ICD-10-CM

## 2022-09-28 PROCEDURE — 99215 OFFICE O/P EST HI 40 MIN: CPT | Performed by: NURSE PRACTITIONER

## 2022-09-28 PROCEDURE — G0463 HOSPITAL OUTPT CLINIC VISIT: HCPCS

## 2022-09-28 RX ORDER — ATORVASTATIN CALCIUM 80 MG/1
1 TABLET, FILM COATED ORAL
COMMUNITY
Start: 2021-05-26 | End: 2023-01-01

## 2022-09-28 RX ORDER — CEFDINIR 300 MG/1
300 CAPSULE ORAL
COMMUNITY
Start: 2022-06-16 | End: 2023-01-01

## 2022-09-28 RX ORDER — SEVELAMER CARBONATE 800 MG/1
3 TABLET, FILM COATED ORAL
COMMUNITY
Start: 2022-01-24 | End: 2023-01-01

## 2022-09-28 RX ORDER — MIDODRINE HYDROCHLORIDE 5 MG/1
TABLET ORAL
COMMUNITY
Start: 2021-10-12 | End: 2023-01-01

## 2022-09-28 RX ORDER — WARFARIN SODIUM 2 MG/1
2 TABLET ORAL
COMMUNITY
Start: 2022-04-22 | End: 2023-01-01

## 2022-09-28 RX ORDER — ASPIRIN 81 MG/1
1 TABLET, CHEWABLE ORAL
COMMUNITY
Start: 2021-10-07

## 2022-09-28 RX ORDER — POLYETHYLENE GLYCOL 3350 17 G/17G
17 POWDER, FOR SOLUTION ORAL
COMMUNITY
Start: 2021-10-07 | End: 2023-01-01

## 2022-09-28 RX ORDER — INSULIN ASPART 100 [IU]/ML
INJECTION, SOLUTION INTRAVENOUS; SUBCUTANEOUS
COMMUNITY
Start: 2021-10-07

## 2022-09-28 RX ORDER — HEPARIN SODIUM 1000 [USP'U]/ML
1000-3000 INJECTION, SOLUTION INTRAVENOUS; SUBCUTANEOUS
COMMUNITY
Start: 2022-04-07

## 2022-09-28 RX ORDER — MIDODRINE HYDROCHLORIDE 5 MG/1
1 TABLET ORAL
COMMUNITY
Start: 2021-10-11 | End: 2023-01-01

## 2022-09-28 RX ORDER — METOPROLOL SUCCINATE 25 MG/1
TABLET, EXTENDED RELEASE ORAL
COMMUNITY
Start: 2022-09-01 | End: 2023-01-01

## 2022-09-28 RX ORDER — MIDODRINE HYDROCHLORIDE 10 MG/1
10 TABLET ORAL
COMMUNITY
Start: 2022-04-23 | End: 2022-01-01

## 2022-09-28 RX ORDER — LANOLIN ALCOHOL/MO/W.PET/CERES
1 CREAM (GRAM) TOPICAL
COMMUNITY
Start: 2021-10-07 | End: 2023-01-01

## 2022-09-28 RX ORDER — CARVEDILOL 3.12 MG/1
1 TABLET ORAL
COMMUNITY
Start: 2021-07-28 | End: 2023-01-01

## 2022-09-28 RX ORDER — CALCITRIOL 1 UG/ML
0.25 SOLUTION ORAL
COMMUNITY
Start: 2022-04-23

## 2022-09-28 RX ORDER — CINACALCET 30 MG/1
1 TABLET, FILM COATED ORAL
COMMUNITY
Start: 2022-02-09 | End: 2023-01-01

## 2022-09-28 RX ORDER — MIDODRINE HYDROCHLORIDE 10 MG/1
TABLET ORAL
COMMUNITY
Start: 2022-05-17 | End: 2023-01-01

## 2022-09-28 RX ORDER — CLOPIDOGREL BISULFATE 75 MG/1
1 TABLET ORAL
COMMUNITY
Start: 2021-07-28 | End: 2023-01-01

## 2022-09-28 RX ORDER — CEFDINIR 300 MG/1
CAPSULE ORAL
COMMUNITY
Start: 2022-06-16 | End: 2023-01-01

## 2022-09-28 RX ORDER — WARFARIN SODIUM 2 MG/1
1 TABLET ORAL
COMMUNITY
Start: 2021-07-28 | End: 2023-01-01

## 2022-09-28 RX ORDER — ACETAMINOPHEN 325 MG/1
650 TABLET ORAL
COMMUNITY
Start: 2022-04-07 | End: 2023-01-01

## 2022-09-28 RX ORDER — DOXYCYCLINE 100 MG/1
CAPSULE ORAL
COMMUNITY
Start: 2022-05-12 | End: 2023-01-01

## 2022-09-28 RX ORDER — FUROSEMIDE 40 MG
1 TABLET ORAL
COMMUNITY
Start: 2021-10-07 | End: 2023-01-01

## 2022-09-28 ASSESSMENT — PAIN SCALES - GENERAL: PAINLEVEL: NO PAIN (0)

## 2022-09-28 NOTE — PROGRESS NOTES
[facility]    OUTPATIENT PHYSICAL THERAPY ORTHOPEDIC EVALUATION  PLAN OF TREATMENT FOR OUTPATIENT REHABILITATION  (COMPLETE FOR INITIAL CLAIMS ONLY)  Patient's Last Name, First Name, M.I.  YOB: 1955  Miguel Parisi    Provider s Name:  [facility]   Medical Record No.  [id number]   Start of Care Date:  09/13/22   Onset Date:  05/31/22   Type:     _X__PT   ___OT   ___SLP Medical Diagnosis:  transmetatarsal amputation, hx of BKA     PT Diagnosis:  impaired mobility   Visits from SOC:  1      _________________________________________________________________________________  Plan of Treatment/Functional Goals:  bed mobility training, gait training, joint mobilization, manual therapy, neuromuscular re-education, ROM, strengthening, stretching           Goals  Goal Identifier: Ambulation  Goal Description: Pt will be able to ambulate 150 feet with FWW for improved mobility at home  Target Date: 10/19/22    Goal Identifier: Prosthesis  Goal Description: Pt will be able to don and doff his prosthesis independently from proper wear and avoiding skin breakdown.  Target Date: 10/19/22    Goal Identifier: Transfers  Goal Description: [place] will report transferring only with sit to stands instead of slide board use.  Target Date: 11/02/22    Goal Identifier: Ambulation  Goal Description: [place] will be able to ambulate 500 feet with cane for improving community mobility.  Target Date: 11/23/22       Therapy Frequency:   (1-2x/week)  Predicted Duration of Therapy Intervention:  12 weeks    Robel Altamirano PT                 I CERTIFY THE NEED FOR THESE SERVICES FURNISHED UNDER        THIS PLAN OF TREATMENT AND WHILE UNDER MY CARE     (Physician co-signature of this document indicates review and certification of the therapy plan).                       Certification Date From:   09/13/22   Certification Date To:  12/07/22    Referring Provider:  Dr. Yin    Initial Assessment        See Epic Evaluation Start of Care Date: 09/13/22

## 2022-09-28 NOTE — NURSING NOTE
"Chief Complaint   Patient presents with     Diabetes     Face to face for orthotic shoes       Initial /72 (BP Location: Right arm, Patient Position: Sitting, Cuff Size: Adult Regular)   Pulse 73   Temp 97.3  F (36.3  C) (Temporal)   Resp 14   SpO2 96%  Estimated body mass index is 29.59 kg/m  as calculated from the following:    Height as of 2/22/22: 1.778 m (5' 10\").    Weight as of 2/22/22: 93.5 kg (206 lb 3.2 oz).  Medication Reconciliation: complete        FOOD SECURITY SCREENING QUESTIONS:    The next two questions are to help us understand your food security.  If you are feeling you need any assistance in this area, we have resources available to support you today.    Hunger Vital Signs:  Within the past 12 months we worried whether our food would run out before we got money to buy more. Never  Within the past 12 months the food we bought just didn't last and we didn't have money to get more. Never        Advance care plan reviewed      Pamela Dutton LPN on 9/28/2022 at 11:22 AM      "

## 2022-09-28 NOTE — PROGRESS NOTES
"  Assessment & Plan     Type 2 diabetes mellitus with microalbuminuria, with long-term current use of insulin (H)  Intermittent claudication (H)  Hemiplegia and hemiparesis following cerebral infarction affecting left non-dominant side (H)  Abnormality of gait as late effect of cerebrovascular accident (CVA)  Amputation of toe of right foot (H)  Patient has need for custom orthotics to remaining right foot due to skin wound and high risk/potential for skin break down. He will need custom orthotics for balance, and to help improve gait imbalance.  - [facility] D[facility] Orthotic, Limb Protector and Supplies; Diabetic Shoe(s)/Insert(s); 3 pair; Faxed to [facility] and patient given copy to hand carry also.    Morbid obesity (H)    40 minutes spent on the date of the encounter doing chart review, history and exam, documentation and further activities per the note       BMI:   Estimated body mass index is 29.59 kg/m  as calculated from the following:    Height as of 2/22/22: 1.778 m (5' 10\").    Weight as of 2/22/22: 93.5 kg (206 lb 3.2 oz).     Return in about 3 months (around 12/28/2022) for Medicare Wellness Visit, Lab Work, Diabetic Foot Exam, Diabetic Follow Up, BP Recheck.    Danii Ortiz NP  [facility]    Subjective      ROSMERY Rivera is a 66 year old accompanied by his grandson, presenting for the following health issues:  Needs orthotic shoe  Patient is s/p right foot TMA on 6/2/22. He denies fever chills nausea. He has been in wheelchair. Is BKA to the left side.   he needs new shoe for right foot.  Daughter has been doing dressing changes to right foot as Home Care services were discontinued.    Per most recent note from [facility]:  Foot is warm. Epicritic and protective threshold is absent. The dorsal latteral incision line is slow to heal with some dehiscence noted. No infection. Mixed granular and fibrotic tissue. Medial ulcer measures 0.3x0.7x0.2 cm with no " infection. Ulcer to the lateral measures 0.2x1.8x0.2cm with no infection. Superficial skin tear noted dorsal medial.   Foot ulcer  Will start with mesalt dressings. Educated on dressing changes. He is to change daily. RTC in 2 to 3 weeks.   Will start with dakin dressing. Educated on dressing changes. To stay in the post op shoe. RTC in 3 to 4 weeks  Electronically signed by Chilango Myers DPM at 09/14/2022 1:36 PM CDT    Review of Systems   Musculoskeletal: Positive for gait problem.        Left BKA, right toes amputation   All other systems reviewed and are negative.           Objective    /72 (BP Location: Right arm, Patient Position: Sitting, Cuff Size: Adult Regular)   Pulse 73   Temp 97.3  F (36.3  C) (Temporal)   Resp 14   SpO2 96%   There is no height or weight on file to calculate BMI.  Physical Exam  Vitals and nursing note reviewed.   Constitutional:       Appearance: Normal appearance.      Comments: In wheelchair   Eyes:      Extraocular Movements: Extraocular movements intact.      Conjunctiva/sclera: Conjunctivae normal.   Cardiovascular:      Heart sounds: Normal heart sounds.   Pulmonary:      Effort: Pulmonary effort is normal.      Breath sounds: Normal breath sounds.   Abdominal:      General: Bowel sounds are normal.      Palpations: Abdomen is soft.   Musculoskeletal:      Comments: Right amputation of all toes; left BKA   Skin:     General: Skin is warm and dry.   Neurological:      Mental Status: He is alert and oriented to person, place, and time. Mental status is at baseline.   Psychiatric:         Mood and Affect: Mood normal.         Behavior: Behavior normal.         Thought Content: Thought content normal.         Judgment: Judgment normal.     Diabetic foot exam: DP absent right, PT absent right, reduced sensation, venous stasis dermatitis noted on right lower extremity and recent amputation of all toes on right foot.              Documentation of Face-to-Face and  Certification for CUSTOM ORTHOTICS/DIABETIC SHOES    Patient: Miguel Parisi   YOB: 1955  MR Number: [id number]  Today's Date: 9/28/2022    I certify that patient: Miguel Parisi is under my care and that I, or a nurse practitioner or physician's assistant working with me, had a face-to-face encounter that meets the physician face-to-face encounter requirements with this patient on: 9/28/2022.    I certify that, based on my findings, the following services are medically necessary: DIABETIC SHOES AND CUSTOM ORTHOTICS.    My clinical findings support the need for the above services because: CUSTOM ORTHOTICS AND DIABETIC SHOES    Based on the above findings. I certify that this patient is confined to the home and needs intermittent skilled nursing care, physical therapy and/or speech therapy.  The patient is under my care, and I have initiated the establishment of the plan of care.  This patient will be followed by a physician who will periodically review the plan of care.  Physician/Provider to provide follow up care: Yari Yin    Responsible Medicare certified PECOS Physician: Humphrey  Physician Signature: See electronic signature associated with these discharge orders.  Date: 9/28/2022

## 2022-09-28 NOTE — PROGRESS NOTES
09/13/22 1300   General Information   Type of Visit Initial OP Ortho PT Evaluation   Start of Care Date 09/13/22   Referring Physician Dr. Yin   Patient/Family Goals Statement ambulate with his prostetic   Orders Evaluate and Treat   Date of Order 07/24/22   Certification Required? Yes   Medical Diagnosis history of L BKA, PAD, R transmetatarsal amputation   Surgical/Medical history reviewed Yes   Weight-Bearing Status - LLE full weight-bearing   Weight-Bearing Status - RLE weight-bearing as tolerated   General Information Comments Miguel presents to therapy with his grandson John. They are here for strengthening and gait training following his R transmetatarsal amputation. Pt also has a L BKA that has healed quite nicely. Last wednesday [facility] gave him his new prosthesis. He has not been up walking with it since.   Body Part(s)   Body Part(s) Ankle/Foot;Knee   Presentation and Etiology   Pertinent history of current problem (include personal factors and/or comorbidities that impact the POC) transmetatarsal amputation, hx of BKA   Impairments A. Pain;B. Decreased WB tolerance;D. Decreased ROM;E. Decreased flexibility;F. Decreased strength and endurance;G. Impaired balance;H. Impaired gait   Functional Limitations perform activities of daily living;perform desired leisure / sports activities   Symptom Location R foot, L LE   How/Where did it occur From insidious onset   Onset date of current episode/exacerbation 05/31/22   Chronicity New   Pain rating (0-10 point scale) Denies pain   Pain/symptoms exacerbated by B. Walking;C. Lifting;G. Certain positions;J. ADL   Prior Level of Function   Prior Level of Function-Mobility independent   Prior Level of Function-ADLs independent   Current Level of Function   Current Community Support Family/friend caregiver   Patient role/employment history F. Retired   Living environment House/townhome   Home/community accessibility does not have handicap  accessible doors in house   Current equipment-Gait/Locomotion Transfer board;Wheelchair   Current equipment-ADL Shower/tub grab bar;Commode   Fall Risk Screen   Fall screen completed by PT   Have you fallen 2 or more times in the past year? No   Have you fallen and had an injury in the past year? No   Is patient a fall risk? Yes   Abuse Screen (yes response referral indicated)   Feels Unsafe at Home or Work/School no   Feels Threatened by Someone no   Does Anyone Try to Keep You From Having Contact with Others or Doing Things Outside Your Home? no   Physical Signs of Abuse Present no   Ankle/Foot Objective Findings   Side (if bilateral, select both right and left) Right   Observation in post op shoe. Bandages covering foot   Integumentary not assessed. Currently in bandages   Gait/Locomotion Step to pattern with prosthesis   Balance/ Proprioception (Single Leg Stance) unable   Ankle/Foot Special Tests Comments special tests deferred due to recent amputation   Right DF (Knee Ext) AROM limited secondary to amputation and dressings   Knee Objective Findings   Gait/Locomotion step to pattern   Observation transtibial amputation   Integumentary  incision well healed.   Posture able to get active knee extension   Side (if bilateral, select both right and left) Left   Left Knee Extension AROM 0   Left Knee Flexion AROM 110+   Left Hip Abduction Strength 4/5   Left Hamstring Flexibility WNLs   Left Hip Flexor Flexibility tight   Planned Therapy Interventions   Planned Therapy Interventions bed mobility training;gait training;joint mobilization;manual therapy;neuromuscular re-education;ROM;strengthening;stretching   Clinical Impression   Criteria for Skilled Therapeutic Interventions Met yes, treatment indicated   PT Diagnosis impaired mobility   Influenced by the following impairments weakness, pain, prosthesis use   Functional limitations due to impairments limited activity tolerance, impaired ambulation, impaired  transfers   Clinical Presentation Evolving/Changing   Clinical Decision Making (Complexity) Low complexity   Therapy Frequency   (1-2x/week)   Predicted Duration of Therapy Intervention (days/wks) 12 weeks   Risk & Benefits of therapy have been explained Yes   Patient, Family & other staff in agreement with plan of care Yes   Clinical Impression Comments Pt presents to therapy with a history of BKA and a transmetatarsal amputation. His wounds are healing and he would benefit from a shoe filler and diabetic shoes for ambulation once his wound has healed fully. Miguel would benefit from skilled therapy services for prosthesis training, balance, and strengthening to help return to ambulation with least restrictive device   ORTHO GOALS   PT Ortho Eval Goals 1;2;3;4   Ortho Goal 1   Goal Identifier Ambulation   Goal Description Pt will be able to ambulate 150 feet with FWW for improved mobility at home   Target Date 10/19/22   Ortho Goal 2   Goal Identifier Prosthesis   Goal Description Pt will be able to don and doff his prosthesis independently from proper wear and avoiding skin breakdown.   Target Date 10/19/22   Ortho Goal 3   Goal Identifier Transfers   Goal Description Kolton will report transferring only with sit to stands instead of slide board use.   Target Date 11/02/22   Ortho Goal 4   Goal Identifier Ambulation   Goal Description Kolton will be able to ambulate 500 feet with cane for improving community mobility.   Target Date 11/23/22   Total Evaluation Time   PT Eval, Low Complexity Minutes (11380) 45   Therapy Certification   Certification date from 09/13/22   Certification date to 12/07/22   Medical Diagnosis transmetatarsal amputation, hx of BKA

## 2022-09-29 ENCOUNTER — HOSPITAL ENCOUNTER (OUTPATIENT)
Dept: PHYSICAL THERAPY | Facility: OTHER | Age: 67
Setting detail: THERAPIES SERIES
Discharge: HOME | End: 2022-09-29
Attending: INTERNAL MEDICINE
Payer: COMMERCIAL

## 2022-09-29 DIAGNOSIS — Z79.01 ANTICOAGULATION MONITORING, INR RANGE 2-3: ICD-10-CM

## 2022-09-29 DIAGNOSIS — I48.0 PAROXYSMAL ATRIAL FIBRILLATION (H): ICD-10-CM

## 2022-09-29 PROCEDURE — 97116 GAIT TRAINING THERAPY: CPT | Mod: GP,PO

## 2022-09-29 PROCEDURE — 97110 THERAPEUTIC EXERCISES: CPT | Mod: GP,PO

## 2022-09-29 NOTE — TELEPHONE ENCOUNTER
ANTICOAGULATION MANAGEMENT:  Medication Refill    Anticoagulation Summary  As of 9/9/2022    Warfarin maintenance plan:  2 mg (2 mg x 1) every Wed; 4 mg (2 mg x 2) all other days   Next INR check:  9/23/2022   Target end date:  8/10/2027    Indications    PAD (peripheral artery disease) (H) [I73.9]  History of cerebrovascular accident on 11/16/2020 [Z86.73]  History of ITP on 4/16/2021 [Z86.2]  H/O STEMI on 4/15/2021 [I25.2]  History of cardiac arrest [Z86.74]  Atherosclerosis of native coronary artery of native heart without angina pectoris [I25.10]  Anticoagulation monitoring  INR range 2-3 [Z79.01]             Anticoagulation Care Providers     Provider Role Specialty Phone number    Danii Ortiz NP Referring Internal Medicine [phone]    Yari Yin DO Referring Internal Medicine [phone]          Visit with referring provider/group within last year: Yes    [facility] referral signed within last year: Yes    Miguel meets all criteria for refill (current [facility] referral, office visit with referring provider/group in last year, lab monitoring up to date or not exceeding 2 weeks overdue). Rx instructions and quantity supplied updated to match patient's current dosing plan per [facility] protocol     Radha Huggins RN  Anticoagulation Clinic

## 2022-09-30 RX ORDER — WARFARIN SODIUM 2 MG/1
TABLET ORAL
Qty: 180 TABLET | Refills: 0 | Status: SHIPPED | OUTPATIENT
Start: 2022-09-30 | End: 2023-01-01

## 2022-10-12 NOTE — PROGRESS NOTES
ANTICOAGULATION MANAGEMENT     Miguel Parisi 66 year old male is on warfarin with supratherapeutic INR result. (Goal INR 2.0-3.0)    Recent labs: (last 7 days)     10/12/22  0753   INR 5.4*       ASSESSMENT       Source(s): Chart Review       Warfarin doses taken: Warfarin taken as instructed    Diet: No new diet changes identified    New illness, injury, or hospitalization: No    Medication/supplement changes: None noted    Signs or symptoms of bleeding or clotting: No    Previous INR: Supratherapeutic    Additional findings: None       PLAN     Recommended plan for no diet, medication or health factor changes affecting INR     Dosing Instructions: hold dose then continue your current warfarin dose with next INR in 1 day       Summary  As of 10/12/2022    Full warfarin instructions:  10/12: Hold; Otherwise 2 mg every Wed; 4 mg all other days   Next INR check:  10/13/2022             In person     Lab visit scheduled    Education provided: Written instructions provided    Plan made per [facility] anticoagulation protocol    Ilsa Hawkins RN  Anticoagulation Clinic  10/12/2022    _______________________________________________________________________     Anticoagulation Episode Summary     Current INR goal:  2.0-3.0   TTR:  59.8 % (1 y)   Target end date:  8/10/2027   Send INR reminders to:  [facility]    Indications    PAD (peripheral artery disease) (H) [I73.9]  History of cerebrovascular accident on 11/16/2020 [Z86.73]  History of ITP on 4/16/2021 [Z86.2]  H/O STEMI on 4/15/2021 [I25.2]  History of cardiac arrest [Z86.74]  Atherosclerosis of native coronary artery of native heart without angina pectoris [I25.10]  Anticoagulation monitoring  INR range 2-3 [Z79.01]           Comments:  [facility].         Anticoagulation Care Providers     Provider Role Specialty Phone number    Danii Ortiz NP Referring Internal Medicine [phone]    Yari Yin DO Referring Internal Medicine  [phone]

## 2022-10-13 NOTE — PROGRESS NOTES
ANTICOAGULATION MANAGEMENT     Miguel Parisi 66 year old male is on warfarin with supratherapeutic INR result. (Goal INR 2.0-3.0)    Recent labs: (last 7 days)     10/13/22  1253   INR 4.4*       ASSESSMENT       Source(s): Chart Review and Patient/Caregiver Call       Warfarin doses taken: Warfarin taken as instructed    Diet: No new diet changes identified    New illness, injury, or hospitalization: No    Medication/supplement changes: None noted    Signs or symptoms of bleeding or clotting: No    Previous INR: Supratherapeutic    Additional findings: None       PLAN     Recommended plan for no diet, medication or health factor changes affecting INR     Dosing Instructions: hold dose then decrease your warfarin dose (7.7% change) with next INR in 1 week       Summary  As of 10/13/2022    Full warfarin instructions:  10/13: Hold; Otherwise 2 mg every Mon, Wed; 4 mg all other days   Next INR check:  10/20/2022             Discussed in clinic    Lab visit scheduled    Education provided: Please call back if any changes to your diet, medications or how you've been taking warfarin and Monitoring for bleeding signs and symptoms    Plan made per [facility] anticoagulation protocol    Michael Ramos RN  Anticoagulation Clinic  10/13/2022    _______________________________________________________________________     Anticoagulation Episode Summary     Current INR goal:  2.0-3.0   TTR:  59.8 % (1 y)   Target end date:  8/10/2027   Send INR reminders to:  [facility]    Indications    PAD (peripheral artery disease) (H) [I73.9]  History of cerebrovascular accident on 11/16/2020 [Z86.73]  History of ITP on 4/16/2021 [Z86.2]  H/O STEMI on 4/15/2021 [I25.2]  History of cardiac arrest [Z86.74]  Atherosclerosis of native coronary artery of native heart without angina pectoris [I25.10]  Anticoagulation monitoring  INR range 2-3 [Z79.01]           Comments:  [facility].         Anticoagulation Care  Providers     Provider Role Specialty Phone number    Danii Ortiz NP Referring Internal Medicine [phone]    Yari Yin DO Referring Internal Medicine [phone]

## 2022-10-20 NOTE — PROGRESS NOTES
ANTICOAGULATION MANAGEMENT     Miguel Parisi 67 year old male is on warfarin with supratherapeutic INR result. (Goal INR 2.0-3.0)    Recent labs: (last 7 days)     10/20/22  1311   INR 3.3*       ASSESSMENT       Source(s): Chart Review and In person       Warfarin doses taken: Warfarin taken as instructed    Diet: No new diet changes identified    New illness, injury, or hospitalization: No    Medication/supplement changes: None noted    Signs or symptoms of bleeding or clotting: No    Previous INR: Supratherapeutic    Additional findings: None       PLAN     Recommended plan for no diet, medication or health factor changes affecting INR     Dosing Instructions: decrease your warfarin dose (25% change) with next INR in 1 week- the 25 % change is due to trying to match up to his last 7 day total and then a small decrease after that.     Summary  As of 10/20/2022    Full warfarin instructions:  4 mg every Mon, Fri; 2 mg all other days; Starting 10/20/2022   Next INR check:  10/27/2022             In person    Lab visit scheduled    Education provided: Please call back if any changes to your diet, medications or how you've been taking warfarin    Plan made per [facility] anticoagulation protocol    Radha Segura, RN  Anticoagulation Clinic  10/20/2022    _______________________________________________________________________     Anticoagulation Episode Summary     Current INR goal:  2.0-3.0   TTR:  59.8 % (1 y)   Target end date:  8/10/2027   Send INR reminders to:  [facility]    Indications    PAD (peripheral artery disease) (H) [I73.9]  History of cerebrovascular accident on 11/16/2020 [Z86.73]  History of ITP on 4/16/2021 [Z86.2]  H/O STEMI on 4/15/2021 [I25.2]  History of cardiac arrest [Z86.74]  Atherosclerosis of native coronary artery of native heart without angina pectoris [I25.10]  Anticoagulation monitoring  INR range 2-3 [Z79.01]           Comments:  [facility].          Anticoagulation Care Providers     Provider Role Specialty Phone number    Danii Ortiz NP Referring Internal Medicine [phone]    Yari Yin DO Referring Internal Medicine [phone]

## 2022-10-27 NOTE — PROGRESS NOTES
ANTICOAGULATION MANAGEMENT     Miguel Parisi 67 year old male is on warfarin with therapeutic INR result. (Goal INR 2.0-3.0)    Recent labs: (last 7 days)     10/27/22  1408   INR 2.7*       ASSESSMENT       Source(s): Chart Review and Patient/Caregiver Call       Warfarin doses taken: Warfarin taken as instructed    Diet: No new diet changes identified    New illness, injury, or hospitalization: No    Medication/supplement changes: None noted    Signs or symptoms of bleeding or clotting: No    Previous INR: Supratherapeutic    Additional findings: None       PLAN     Recommended plan for no diet, medication or health factor changes affecting INR     Dosing Instructions: Continue your current warfarin dose with next INR in 2 weeks       Summary  As of 10/27/2022    Full warfarin instructions:  4 mg every Mon, Fri; 2 mg all other days; Starting 10/27/2022   Next INR check:  11/10/2022             Discussed in clinic    Lab visit scheduled    Education provided: Please call back if any changes to your diet, medications or how you've been taking warfarin    Plan made per [facility] anticoagulation protocol    Michael Ramos RN  Anticoagulation Clinic  10/27/2022    _______________________________________________________________________     Anticoagulation Episode Summary     Current INR goal:  2.0-3.0   TTR:  59.0 % (1 y)   Target end date:  8/10/2027   Send INR reminders to:  [facility]    Indications    PAD (peripheral artery disease) (H) [I73.9]  History of cerebrovascular accident on 11/16/2020 [Z86.73]  History of ITP on 4/16/2021 [Z86.2]  H/O STEMI on 4/15/2021 [I25.2]  History of cardiac arrest [Z86.74]  Atherosclerosis of native coronary artery of native heart without angina pectoris [I25.10]  Anticoagulation monitoring  INR range 2-3 [Z79.01]           Comments:  [facility].         Anticoagulation Care Providers     Provider Role Specialty Phone number    Danii Ortiz NP  Referring Internal Medicine [phone]    Yari Yin DO Referring Internal Medicine [phone]

## 2022-11-01 NOTE — PROGRESS NOTES
[facility]    Outpatient Physical Therapy Progress Note  Patient: Miguel Parisi  : 1955    Beginning/End Dates of Reporting Period:  22 to 10/27/22    Referring Provider: Dr. Humphrey DO    Therapy Diagnosis: S/p BKA, s/p transmetatarsal amputation     Client Self Report: Continues to have pain, felt pop in his knee with nustep today.    Goals:  Goal Identifier Ambulation   Goal Description Pt will be able to ambulate 150 feet with FWW for improved mobility at home   Target Date 10/19/22   Date Met      Progress (detail required for progress note): ambulating approx 70 feet at most     Goal Identifier Prosthesis   Goal Description Pt will be able to don and doff his prosthesis independently from proper wear and avoiding skin breakdown.   Target Date 10/19/22   Date Met  10/27/22   Progress (detail required for progress note):       Goal Identifier Transfers   Goal Description Kolton will report transferring only with sit to stands instead of slide board use.   Target Date 22   Date Met      Progress (detail required for progress note): continues to use slide board at home. Progressing as he continues to use his prosthesis and walker     Goal Identifier Ambulation   Goal Description Kolton will be able to ambulate 500 feet with cane for improving community mobility.   Target Date 22   Date Met      Progress (detail required for progress note): not ambulating with a cane yet       Plan:  Continue therapy per current plan of care. Kolton has increased his prosthesis wearing and certainly is moving better here at this point. He is walking short distances with a FWW at home and at therapy. He continues to get some posterior lower extremity pain in hamstrings and distal residual limb, however this does not appear to be due to skin breakdown and prosthesis wearing. Will continue to progress Kolton's  functional mobility as tolerated.    Discharge:  No

## 2022-11-02 NOTE — TELEPHONE ENCOUNTER
ANTICOAGULATION MANAGEMENT:  Medication Refill    Anticoagulation Summary  As of 10/27/2022    Warfarin maintenance plan:  4 mg (2 mg x 2) every Mon, Fri; 2 mg (2 mg x 1) all other days; Starting 10/27/2022   Next INR check:  11/10/2022   Target end date:  8/10/2027    Indications    PAD (peripheral artery disease) (H) [I73.9]  History of cerebrovascular accident on 11/16/2020 [Z86.73]  History of ITP on 4/16/2021 [Z86.2]  H/O STEMI on 4/15/2021 [I25.2]  History of cardiac arrest [Z86.74]  Atherosclerosis of native coronary artery of native heart without angina pectoris [I25.10]  Anticoagulation monitoring  INR range 2-3 [Z79.01]             Anticoagulation Care Providers     Provider Role Specialty Phone number    Danii Ortiz NP Referring Internal Medicine [phone]    Yari Yin DO Referring Internal Medicine [phone]          Visit with referring provider/group within last year: Yes    [facility] referral signed within last year: Yes    Miguel meets all criteria for refill (current [facility] referral, office visit with referring provider/group in last year, lab monitoring up to date or not exceeding 2 weeks overdue). Rx instructions and quantity match patient's current dosing plan. Warfarin 90 day supply with 1 refill granted per [facility] protocol     Gissel Arechiga, RN  Anticoagulation Clinic

## 2022-11-04 NOTE — TELEPHONE ENCOUNTER
[id number] [place] sent Rx request for the following:      Requested Prescriptions   Pending Prescriptions Disp Refills     midodrine (PROAMATINE) 10 MG tablet 11 tablet      Sig: Take 1 tablet by mouth every Tuesday, Thursday, and Saturday. Avoid taking after the evening meal or within 4 hours of bedtime       There is no refill protocol information for this order          Last Prescription Date:   09/28/2022  Last Fill Qty/Refills:        Historical  Last Office Visit:              09/28/2022  Future Office visit:           None      Bhumi Almanzar RN  ....................  11/4/2022   4:40 PM

## 2022-11-10 NOTE — PROGRESS NOTES
ANTICOAGULATION MANAGEMENT     Miguel Parisi 67 year old male is on warfarin with subtherapeutic INR result. (Goal INR 2.0-3.0)    Recent labs: (last 7 days)     11/10/22  1402   INR 1.8*       ASSESSMENT       Source(s): Chart Review and In person       Warfarin doses taken: Warfarin taken as instructed    Diet: No new diet changes identified    New illness, injury, or hospitalization: No    Medication/supplement changes: None noted    Signs or symptoms of bleeding or clotting: No    Previous INR: Therapeutic last visit; previously outside of goal range    Additional findings: None       PLAN     Recommended plan for no diet, medication or health factor changes affecting INR     Dosing Instructions: Continue your current warfarin dose with next INR in 2 weeks       Summary  As of 11/10/2022    Full warfarin instructions:  4 mg every Mon, Fri; 2 mg all other days; Starting 11/10/2022   Next INR check:  11/22/2022             In person contact    Lab visit scheduled    Education provided: Please call back if any changes to your diet, medications or how you've been taking warfarin    Plan made per [facility] anticoagulation protocol    Radha Huggins RN  Anticoagulation Clinic  11/10/2022    _______________________________________________________________________     Anticoagulation Episode Summary     Current INR goal:  2.0-3.0   TTR:  59.9 % (1 y)   Target end date:  8/10/2027   Send INR reminders to:  [facility]    Indications    PAD (peripheral artery disease) (H) [I73.9]  History of cerebrovascular accident on 11/16/2020 [Z86.73]  History of ITP on 4/16/2021 [Z86.2]  H/O STEMI on 4/15/2021 [I25.2]  History of cardiac arrest [Z86.74]  Atherosclerosis of native coronary artery of native heart without angina pectoris [I25.10]  Anticoagulation monitoring  INR range 2-3 [Z79.01]           Comments:           Anticoagulation Care Providers     Provider Role Specialty Phone number    Danii Ortiz NP  Referring Internal Medicine [phone]    Yari Yin DO Referring Internal Medicine [phone]

## 2022-11-22 NOTE — PROGRESS NOTES
ANTICOAGULATION MANAGEMENT     Miguel Parisi 67 year old male is on warfarin with subtherapeutic INR result. (Goal INR 2.0-3.0)    Recent labs: (last 7 days)     11/22/22  1233   INR 1.9*       ASSESSMENT       Source(s): Chart Review and In person       Warfarin doses taken: Warfarin taken as instructed    Diet: No new diet changes identified    New illness, injury, or hospitalization: No    Medication/supplement changes: None noted    Signs or symptoms of bleeding or clotting: No    Previous INR: Subtherapeutic    Additional findings: None       PLAN     Recommended plan for no diet, medication or health factor changes affecting INR     Dosing Instructions: Increase your warfarin dose (5.6% change) with next INR in 2 weeks       Summary  As of 11/22/2022    Full warfarin instructions:  2 mg every Mon, Fri; 3 mg all other days; Starting 11/22/2022   Next INR check:  12/6/2022             In person    Lab visit scheduled    Education provided: Please call back if any changes to your diet, medications or how you've been taking warfarin    Plan made per [facility] anticoagulation protocol    Radha Segura RN  Anticoagulation Clinic  11/22/2022    _______________________________________________________________________     Anticoagulation Episode Summary     Current INR goal:  2.0-3.0   TTR:  59.8 % (1 y)   Target end date:  8/10/2027   Send INR reminders to:  [facility]    Indications    PAD (peripheral artery disease) (H) [I73.9]  History of cerebrovascular accident on 11/16/2020 [Z86.73]  History of ITP on 4/16/2021 [Z86.2]  H/O STEMI on 4/15/2021 [I25.2]  History of cardiac arrest [Z86.74]  Atherosclerosis of native coronary artery of native heart without angina pectoris [I25.10]  Anticoagulation monitoring  INR range 2-3 [Z79.01]           Comments:           Anticoagulation Care Providers     Provider Role Specialty Phone number    Danii Ortiz NP Referring Internal Medicine [phone]     Yari Yin,  Referring Internal Medicine [phone]

## 2022-12-06 NOTE — PROGRESS NOTES
ANTICOAGULATION MANAGEMENT     Miguel Parisi 67 year old male is on warfarin with therapeutic INR result. (Goal INR 2.0-3.0)    Recent labs: (last 7 days)     12/06/22  1242   INR 2.3*       ASSESSMENT       Source(s): Chart Review and In person       Warfarin doses taken: Warfarin taken as instructed    Diet: No new diet changes identified    New illness, injury, or hospitalization: No    Medication/supplement changes: None noted    Signs or symptoms of bleeding or clotting: No    Previous INR: Subtherapeutic    Additional findings: None       PLAN     Recommended plan for no diet, medication or health factor changes affecting INR     Dosing Instructions: Continue your current warfarin dose with next INR in 3 weeks       Summary  As of 12/6/2022    Full warfarin instructions:  2 mg every Mon, Fri; 3 mg all other days; Starting 12/6/2022   Next INR check:  12/27/2022             In person    Lab visit scheduled    Education provided: Please call back if any changes to your diet, medications or how you've been taking warfarin    Plan made per [facility] anticoagulation protocol    Radha Segura RN  Anticoagulation Clinic  12/6/2022    _______________________________________________________________________     Anticoagulation Episode Summary     Current INR goal:  2.0-3.0   TTR:  62.7 % (1 y)   Target end date:  8/10/2027   Send INR reminders to:  [facility]    Indications    PAD (peripheral artery disease) (H) [I73.9]  History of cerebrovascular accident on 11/16/2020 [Z86.73]  History of ITP on 4/16/2021 [Z86.2]  H/O STEMI on 4/15/2021 [I25.2]  History of cardiac arrest [Z86.74]  Atherosclerosis of native coronary artery of native heart without angina pectoris [I25.10]  Anticoagulation monitoring  INR range 2-3 [Z79.01]           Comments:           Anticoagulation Care Providers     Provider Role Specialty Phone number    Danii Ortiz NP Referring Internal Medicine [phone]    Humphrey  Yari PERALES,  [facility] Internal Medicine [phone]

## 2022-12-27 NOTE — PROGRESS NOTES
ANTICOAGULATION MANAGEMENT     Miguel Parisi 67 year old male is on warfarin with therapeutic INR result. (Goal INR 2.0-3.0)    Recent labs: (last 7 days)     12/27/22  1234   INR 2.9*       ASSESSMENT       Source(s): Chart Review and In person       Warfarin doses taken: Warfarin taken as instructed    Diet: No new diet changes identified    New illness, injury, or hospitalization: No    Medication/supplement changes: None noted    Signs or symptoms of bleeding or clotting: No    Previous INR: Therapeutic last visit; previously outside of goal range    Additional findings: None       PLAN     Recommended plan for no diet, medication or health factor changes affecting INR     Dosing Instructions: Continue your current warfarin dose with next INR in 4 weeks       Summary  As of 12/27/2022    Full warfarin instructions:  2 mg every Mon, Fri; 3 mg all other days   Next INR check:  1/24/2023             In person    Lab visit scheduled    Education provided: Please call back if any changes to your diet, medications or how you've been taking warfarin    Plan made per [facility] anticoagulation protocol    Radha Segura RN  Anticoagulation Clinic  12/27/2022    _______________________________________________________________________     Anticoagulation Episode Summary     Current INR goal:  2.0-3.0   TTR:  64.3 % (1 y)   Target end date:  8/10/2027   Send INR reminders to:  [facility]    Indications    PAD (peripheral artery disease) (H) [I73.9]  History of cerebrovascular accident on 11/16/2020 [Z86.73]  History of ITP on 4/16/2021 [Z86.2]  H/O STEMI on 4/15/2021 [I25.2]  History of cardiac arrest [Z86.74]  Atherosclerosis of native coronary artery of native heart without angina pectoris [I25.10]  Anticoagulation monitoring  INR range 2-3 [Z79.01]           Comments:           Anticoagulation Care Providers     Provider Role Specialty Phone number    Danii Ortiz NP Referring Internal Medicine  [phone]    Yari Yin DO [facility] Internal Medicine [phone]

## 2023-01-01 ENCOUNTER — ANTICOAGULATION THERAPY VISIT (OUTPATIENT)
Dept: ANTICOAGULATION | Facility: OTHER | Age: 68
End: 2023-01-01
Attending: INTERNAL MEDICINE
Payer: COMMERCIAL

## 2023-01-01 ENCOUNTER — HOSPITAL ENCOUNTER (EMERGENCY)
Facility: OTHER | Age: 68
Discharge: ANOTHER HOSPITAL | End: 2023-09-25
Attending: FAMILY MEDICINE | Admitting: FAMILY MEDICINE
Payer: COMMERCIAL

## 2023-01-01 ENCOUNTER — THERAPY VISIT (OUTPATIENT)
Dept: PHYSICAL THERAPY | Facility: OTHER | Age: 68
End: 2023-01-01
Attending: INTERNAL MEDICINE
Payer: COMMERCIAL

## 2023-01-01 ENCOUNTER — TELEPHONE (OUTPATIENT)
Dept: INTERNAL MEDICINE | Facility: OTHER | Age: 68
End: 2023-01-01
Payer: COMMERCIAL

## 2023-01-01 ENCOUNTER — HOSPITAL ENCOUNTER (OUTPATIENT)
Dept: PHYSICAL THERAPY | Facility: OTHER | Age: 68
Setting detail: THERAPIES SERIES
Discharge: HOME | End: 2023-01-12
Attending: INTERNAL MEDICINE
Payer: MEDICARE

## 2023-01-01 ENCOUNTER — HOSPITAL ENCOUNTER (OUTPATIENT)
Dept: PHYSICAL THERAPY | Facility: OTHER | Age: 68
Setting detail: THERAPIES SERIES
Discharge: HOME | End: 2023-02-02
Attending: INTERNAL MEDICINE
Payer: MEDICARE

## 2023-01-01 ENCOUNTER — HEALTH MAINTENANCE LETTER (OUTPATIENT)
Age: 68
End: 2023-01-01

## 2023-01-01 ENCOUNTER — HOSPITAL ENCOUNTER (OUTPATIENT)
Dept: PHYSICAL THERAPY | Facility: OTHER | Age: 68
Setting detail: THERAPIES SERIES
Discharge: HOME | End: 2023-03-09
Attending: INTERNAL MEDICINE
Payer: COMMERCIAL

## 2023-01-01 ENCOUNTER — HOSPITAL ENCOUNTER (OUTPATIENT)
Dept: PHYSICAL THERAPY | Facility: OTHER | Age: 68
Setting detail: THERAPIES SERIES
Discharge: HOME | End: 2023-04-06
Attending: INTERNAL MEDICINE
Payer: COMMERCIAL

## 2023-01-01 ENCOUNTER — HOSPITAL ENCOUNTER (OUTPATIENT)
Dept: PHYSICAL THERAPY | Facility: OTHER | Age: 68
Setting detail: THERAPIES SERIES
Discharge: HOME | End: 2023-02-07
Attending: INTERNAL MEDICINE
Payer: MEDICARE

## 2023-01-01 ENCOUNTER — HOSPITAL ENCOUNTER (OUTPATIENT)
Dept: PHYSICAL THERAPY | Facility: OTHER | Age: 68
Setting detail: THERAPIES SERIES
Discharge: HOME | End: 2023-01-19
Attending: INTERNAL MEDICINE
Payer: MEDICARE

## 2023-01-01 ENCOUNTER — HOSPITAL ENCOUNTER (OUTPATIENT)
Dept: PHYSICAL THERAPY | Facility: OTHER | Age: 68
Setting detail: THERAPIES SERIES
Discharge: HOME | End: 2023-01-31
Attending: INTERNAL MEDICINE
Payer: MEDICARE

## 2023-01-01 ENCOUNTER — HOSPITAL ENCOUNTER (OUTPATIENT)
Dept: PHYSICAL THERAPY | Facility: OTHER | Age: 68
Setting detail: THERAPIES SERIES
Discharge: HOME | End: 2023-05-09
Attending: INTERNAL MEDICINE
Payer: COMMERCIAL

## 2023-01-01 ENCOUNTER — HOSPITAL ENCOUNTER (OUTPATIENT)
Dept: PHYSICAL THERAPY | Facility: OTHER | Age: 68
Setting detail: THERAPIES SERIES
Discharge: HOME | End: 2023-05-04
Attending: INTERNAL MEDICINE
Payer: COMMERCIAL

## 2023-01-01 ENCOUNTER — APPOINTMENT (OUTPATIENT)
Dept: ULTRASOUND IMAGING | Facility: OTHER | Age: 68
End: 2023-01-01
Attending: FAMILY MEDICINE
Payer: COMMERCIAL

## 2023-01-01 ENCOUNTER — HOSPITAL ENCOUNTER (OUTPATIENT)
Dept: PHYSICAL THERAPY | Facility: OTHER | Age: 68
Setting detail: THERAPIES SERIES
Discharge: HOME | End: 2023-03-23
Attending: INTERNAL MEDICINE
Payer: COMMERCIAL

## 2023-01-01 ENCOUNTER — TRANSFERRED RECORDS (OUTPATIENT)
Dept: HEALTH INFORMATION MANAGEMENT | Facility: OTHER | Age: 68
End: 2023-01-01

## 2023-01-01 ENCOUNTER — HOSPITAL ENCOUNTER (OUTPATIENT)
Dept: PHYSICAL THERAPY | Facility: OTHER | Age: 68
Setting detail: THERAPIES SERIES
Discharge: HOME | End: 2023-01-26
Attending: INTERNAL MEDICINE
Payer: MEDICARE

## 2023-01-01 ENCOUNTER — APPOINTMENT (OUTPATIENT)
Dept: GENERAL RADIOLOGY | Facility: OTHER | Age: 68
End: 2023-01-01
Attending: FAMILY MEDICINE
Payer: COMMERCIAL

## 2023-01-01 ENCOUNTER — APPOINTMENT (OUTPATIENT)
Dept: CT IMAGING | Facility: OTHER | Age: 68
End: 2023-01-01
Attending: FAMILY MEDICINE
Payer: COMMERCIAL

## 2023-01-01 ENCOUNTER — HOSPITAL ENCOUNTER (OUTPATIENT)
Dept: PHYSICAL THERAPY | Facility: OTHER | Age: 68
Setting detail: THERAPIES SERIES
Discharge: HOME | End: 2023-02-28
Attending: INTERNAL MEDICINE
Payer: MEDICARE

## 2023-01-01 ENCOUNTER — HOSPITAL ENCOUNTER (OUTPATIENT)
Dept: PHYSICAL THERAPY | Facility: OTHER | Age: 68
Setting detail: THERAPIES SERIES
Discharge: HOME | End: 2023-04-25
Attending: INTERNAL MEDICINE
Payer: COMMERCIAL

## 2023-01-01 ENCOUNTER — HOSPITAL ENCOUNTER (OUTPATIENT)
Dept: PHYSICAL THERAPY | Facility: OTHER | Age: 68
Setting detail: THERAPIES SERIES
Discharge: HOME | End: 2023-03-14
Attending: INTERNAL MEDICINE
Payer: COMMERCIAL

## 2023-01-01 ENCOUNTER — HOSPITAL ENCOUNTER (EMERGENCY)
Facility: OTHER | Age: 68
Discharge: ANOTHER HOSPITAL | End: 2023-09-21
Attending: FAMILY MEDICINE | Admitting: FAMILY MEDICINE
Payer: COMMERCIAL

## 2023-01-01 ENCOUNTER — HOSPITAL ENCOUNTER (OUTPATIENT)
Dept: PHYSICAL THERAPY | Facility: OTHER | Age: 68
Setting detail: THERAPIES SERIES
Discharge: HOME | End: 2023-03-02
Attending: INTERNAL MEDICINE
Payer: COMMERCIAL

## 2023-01-01 ENCOUNTER — HOSPITAL ENCOUNTER (OUTPATIENT)
Dept: PHYSICAL THERAPY | Facility: OTHER | Age: 68
Setting detail: THERAPIES SERIES
Discharge: HOME | End: 2023-02-14
Attending: INTERNAL MEDICINE
Payer: MEDICARE

## 2023-01-01 ENCOUNTER — ANTICOAGULATION THERAPY VISIT (OUTPATIENT)
Dept: ANTICOAGULATION | Facility: OTHER | Age: 68
End: 2023-01-01
Attending: INTERNAL MEDICINE
Payer: MEDICARE

## 2023-01-01 ENCOUNTER — HOSPITAL ENCOUNTER (OUTPATIENT)
Dept: GENERAL RADIOLOGY | Facility: OTHER | Age: 68
Discharge: HOME | End: 2023-09-19
Attending: FAMILY MEDICINE
Payer: COMMERCIAL

## 2023-01-01 ENCOUNTER — HOSPITAL ENCOUNTER (OUTPATIENT)
Dept: PHYSICAL THERAPY | Facility: OTHER | Age: 68
Setting detail: THERAPIES SERIES
Discharge: HOME | End: 2023-02-09
Attending: INTERNAL MEDICINE
Payer: MEDICARE

## 2023-01-01 ENCOUNTER — HOSPITAL ENCOUNTER (OUTPATIENT)
Dept: PHYSICAL THERAPY | Facility: OTHER | Age: 68
Setting detail: THERAPIES SERIES
Discharge: HOME | End: 2023-03-07
Attending: INTERNAL MEDICINE
Payer: COMMERCIAL

## 2023-01-01 ENCOUNTER — HOSPITAL ENCOUNTER (OUTPATIENT)
Dept: PHYSICAL THERAPY | Facility: OTHER | Age: 68
Setting detail: THERAPIES SERIES
Discharge: HOME | End: 2023-05-02
Attending: INTERNAL MEDICINE
Payer: COMMERCIAL

## 2023-01-01 ENCOUNTER — HOSPITAL ENCOUNTER (OUTPATIENT)
Dept: PHYSICAL THERAPY | Facility: OTHER | Age: 68
Setting detail: THERAPIES SERIES
Discharge: HOME | End: 2023-04-27
Attending: INTERNAL MEDICINE
Payer: COMMERCIAL

## 2023-01-01 ENCOUNTER — HOSPITAL ENCOUNTER (OUTPATIENT)
Dept: PHYSICAL THERAPY | Facility: OTHER | Age: 68
Setting detail: THERAPIES SERIES
Discharge: HOME | End: 2023-05-11
Attending: INTERNAL MEDICINE
Payer: COMMERCIAL

## 2023-01-01 ENCOUNTER — TRANSFERRED RECORDS (OUTPATIENT)
Dept: FAMILY MEDICINE | Facility: OTHER | Age: 68
End: 2023-01-01
Payer: COMMERCIAL

## 2023-01-01 ENCOUNTER — OFFICE VISIT (OUTPATIENT)
Dept: FAMILY MEDICINE | Facility: OTHER | Age: 68
End: 2023-01-01
Attending: FAMILY MEDICINE
Payer: COMMERCIAL

## 2023-01-01 VITALS
TEMPERATURE: 98.6 F | RESPIRATION RATE: 28 BRPM | HEART RATE: 93 BPM | DIASTOLIC BLOOD PRESSURE: 85 MMHG | SYSTOLIC BLOOD PRESSURE: 126 MMHG | OXYGEN SATURATION: 99 %

## 2023-01-01 VITALS
DIASTOLIC BLOOD PRESSURE: 79 MMHG | RESPIRATION RATE: 18 BRPM | SYSTOLIC BLOOD PRESSURE: 120 MMHG | OXYGEN SATURATION: 96 % | TEMPERATURE: 98.4 F | HEART RATE: 67 BPM

## 2023-01-01 VITALS — SYSTOLIC BLOOD PRESSURE: 126 MMHG | DIASTOLIC BLOOD PRESSURE: 85 MMHG

## 2023-01-01 VITALS
WEIGHT: 227.6 LBS | TEMPERATURE: 98.3 F | OXYGEN SATURATION: 100 % | HEART RATE: 78 BPM | RESPIRATION RATE: 20 BRPM | BODY MASS INDEX: 32.66 KG/M2

## 2023-01-01 DIAGNOSIS — Z79.01 ANTICOAGULATION MONITORING, INR RANGE 2-3: ICD-10-CM

## 2023-01-01 DIAGNOSIS — E87.20 LACTIC ACIDOSIS: ICD-10-CM

## 2023-01-01 DIAGNOSIS — I25.2 HISTORY OF ST ELEVATION MYOCARDIAL INFARCTION (STEMI): ICD-10-CM

## 2023-01-01 DIAGNOSIS — Z89.512 HX OF BKA, LEFT (H): Primary | ICD-10-CM

## 2023-01-01 DIAGNOSIS — Z89.512 HX OF BKA, LEFT (H): ICD-10-CM

## 2023-01-01 DIAGNOSIS — Z86.2 HISTORY OF ITP: ICD-10-CM

## 2023-01-01 DIAGNOSIS — Z44.9: ICD-10-CM

## 2023-01-01 DIAGNOSIS — M79.605 LEG PAIN, BILATERAL: ICD-10-CM

## 2023-01-01 DIAGNOSIS — I25.10 ATHEROSCLEROSIS OF NATIVE CORONARY ARTERY OF NATIVE HEART WITHOUT ANGINA PECTORIS: ICD-10-CM

## 2023-01-01 DIAGNOSIS — I73.9 PAD (PERIPHERAL ARTERY DISEASE) (H): Primary | ICD-10-CM

## 2023-01-01 DIAGNOSIS — Z86.74 HISTORY OF CARDIAC ARREST: ICD-10-CM

## 2023-01-01 DIAGNOSIS — E87.5 HYPERKALEMIA: ICD-10-CM

## 2023-01-01 DIAGNOSIS — R80.9 TYPE 2 DIABETES MELLITUS WITH MICROALBUMINURIA, WITH LONG-TERM CURRENT USE OF INSULIN (H): ICD-10-CM

## 2023-01-01 DIAGNOSIS — Z86.73 HISTORY OF CEREBROVASCULAR ACCIDENT: ICD-10-CM

## 2023-01-01 DIAGNOSIS — I73.9 PAD (PERIPHERAL ARTERY DISEASE) (H): ICD-10-CM

## 2023-01-01 DIAGNOSIS — E11.29 TYPE 2 DIABETES MELLITUS WITH MICROALBUMINURIA, WITH LONG-TERM CURRENT USE OF INSULIN (H): ICD-10-CM

## 2023-01-01 DIAGNOSIS — Z79.4 TYPE 2 DIABETES MELLITUS WITH MICROALBUMINURIA, WITH LONG-TERM CURRENT USE OF INSULIN (H): ICD-10-CM

## 2023-01-01 DIAGNOSIS — Z99.2 ESRD (END STAGE RENAL DISEASE) ON DIALYSIS (H): ICD-10-CM

## 2023-01-01 DIAGNOSIS — R26.89 DECREASED MOBILITY: ICD-10-CM

## 2023-01-01 DIAGNOSIS — R94.5 ABNORMAL LIVER FUNCTION: ICD-10-CM

## 2023-01-01 DIAGNOSIS — M79.604 LEG PAIN, BILATERAL: ICD-10-CM

## 2023-01-01 DIAGNOSIS — I69.398 ABNORMALITY OF GAIT AS LATE EFFECT OF CEREBROVASCULAR ACCIDENT (CVA): ICD-10-CM

## 2023-01-01 DIAGNOSIS — R06.02 SOB (SHORTNESS OF BREATH): ICD-10-CM

## 2023-01-01 DIAGNOSIS — R05.1 ACUTE COUGH: Primary | ICD-10-CM

## 2023-01-01 DIAGNOSIS — N18.6 ESRD (END STAGE RENAL DISEASE) ON DIALYSIS (H): ICD-10-CM

## 2023-01-01 DIAGNOSIS — Z99.2 DIALYSIS PATIENT (H): ICD-10-CM

## 2023-01-01 DIAGNOSIS — R26.9 ABNORMALITY OF GAIT AS LATE EFFECT OF CEREBROVASCULAR ACCIDENT (CVA): ICD-10-CM

## 2023-01-01 LAB
ALBUMIN SERPL BCG-MCNC: 3.5 G/DL (ref 3.5–5.2)
ALBUMIN SERPL BCG-MCNC: 3.5 G/DL (ref 3.5–5.2)
ALBUMIN SERPL BCG-MCNC: 3.9 G/DL (ref 3.5–5.2)
ALBUMIN UR-MCNC: 20 MG/DL
ALBUMIN UR-MCNC: 30 MG/DL
ALP SERPL-CCNC: 124 U/L (ref 40–129)
ALP SERPL-CCNC: 171 U/L (ref 40–129)
ALP SERPL-CCNC: 179 U/L (ref 40–129)
ALT SERPL W P-5'-P-CCNC: 127 U/L (ref 0–70)
ALT SERPL W P-5'-P-CCNC: 262 U/L (ref 0–70)
ALT SERPL W P-5'-P-CCNC: 278 U/L (ref 0–70)
ANION GAP SERPL CALCULATED.3IONS-SCNC: 24 MMOL/L (ref 7–15)
ANION GAP SERPL CALCULATED.3IONS-SCNC: 24 MMOL/L (ref 7–15)
ANION GAP SERPL CALCULATED.3IONS-SCNC: 25 MMOL/L (ref 7–15)
ANION GAP SERPL CALCULATED.3IONS-SCNC: 26 MMOL/L (ref 7–15)
APPEARANCE UR: CLEAR
APPEARANCE UR: CLEAR
AST SERPL W P-5'-P-CCNC: 251 U/L (ref 0–45)
AST SERPL W P-5'-P-CCNC: 280 U/L (ref 0–45)
AST SERPL W P-5'-P-CCNC: 55 U/L (ref 0–45)
ATRIAL RATE - MUSE: 136 BPM
ATRIAL RATE - MUSE: 81 BPM
BACTERIA BLD CULT: NO GROWTH
BACTERIA BLD CULT: NO GROWTH
BACTERIA UR CULT: NORMAL
BASE EXCESS BLDV CALC-SCNC: -7.1 MMOL/L (ref -7.7–1.9)
BASOPHILS # BLD AUTO: 0.1 10E3/UL (ref 0–0.2)
BASOPHILS # BLD AUTO: 0.1 10E3/UL (ref 0–0.2)
BASOPHILS NFR BLD AUTO: 1 %
BASOPHILS NFR BLD AUTO: 1 %
BILIRUB SERPL-MCNC: 0.8 MG/DL
BILIRUB SERPL-MCNC: 0.9 MG/DL
BILIRUB SERPL-MCNC: 1.5 MG/DL
BILIRUB UR QL STRIP: ABNORMAL
BILIRUB UR QL STRIP: NEGATIVE
BUN SERPL-MCNC: 56.7 MG/DL (ref 8–23)
BUN SERPL-MCNC: 56.7 MG/DL (ref 8–23)
BUN SERPL-MCNC: 87.8 MG/DL (ref 8–23)
BUN SERPL-MCNC: 90.7 MG/DL (ref 8–23)
CALCIUM SERPL-MCNC: 8.6 MG/DL (ref 8.8–10.2)
CALCIUM SERPL-MCNC: 8.6 MG/DL (ref 8.8–10.2)
CALCIUM SERPL-MCNC: 8.7 MG/DL (ref 8.8–10.2)
CALCIUM SERPL-MCNC: 8.7 MG/DL (ref 8.8–10.2)
CHLORIDE SERPL-SCNC: 92 MMOL/L (ref 98–107)
CHLORIDE SERPL-SCNC: 93 MMOL/L (ref 98–107)
COLOR UR AUTO: YELLOW
COLOR UR AUTO: YELLOW
CREAT SERPL-MCNC: 6.83 MG/DL (ref 0.67–1.17)
CREAT SERPL-MCNC: 6.83 MG/DL (ref 0.67–1.17)
CREAT SERPL-MCNC: 8.94 MG/DL (ref 0.67–1.17)
CREAT SERPL-MCNC: 9 MG/DL (ref 0.67–1.17)
DEPRECATED HCO3 PLAS-SCNC: 15 MMOL/L (ref 22–29)
DEPRECATED HCO3 PLAS-SCNC: 16 MMOL/L (ref 22–29)
DEPRECATED HCO3 PLAS-SCNC: 17 MMOL/L (ref 22–29)
DEPRECATED HCO3 PLAS-SCNC: 17 MMOL/L (ref 22–29)
DIASTOLIC BLOOD PRESSURE - MUSE: NORMAL MMHG
DIASTOLIC BLOOD PRESSURE - MUSE: NORMAL MMHG
EGFRCR SERPLBLD CKD-EPI 2021: 6 ML/MIN/1.73M2
EGFRCR SERPLBLD CKD-EPI 2021: 6 ML/MIN/1.73M2
EGFRCR SERPLBLD CKD-EPI 2021: 8 ML/MIN/1.73M2
EGFRCR SERPLBLD CKD-EPI 2021: 8 ML/MIN/1.73M2
EOSINOPHIL # BLD AUTO: 0 10E3/UL (ref 0–0.7)
EOSINOPHIL # BLD AUTO: 0.1 10E3/UL (ref 0–0.7)
EOSINOPHIL NFR BLD AUTO: 0 %
EOSINOPHIL NFR BLD AUTO: 1 %
ERYTHROCYTE [DISTWIDTH] IN BLOOD BY AUTOMATED COUNT: 17 % (ref 10–15)
ERYTHROCYTE [DISTWIDTH] IN BLOOD BY AUTOMATED COUNT: 17.4 % (ref 10–15)
FLUAV RNA SPEC QL NAA+PROBE: NEGATIVE
FLUBV RNA RESP QL NAA+PROBE: NEGATIVE
GLUCOSE BLDC GLUCOMTR-MCNC: 115 MG/DL (ref 70–99)
GLUCOSE BLDC GLUCOMTR-MCNC: 137 MG/DL (ref 70–99)
GLUCOSE BLDC GLUCOMTR-MCNC: 143 MG/DL (ref 70–99)
GLUCOSE SERPL-MCNC: 134 MG/DL (ref 70–99)
GLUCOSE SERPL-MCNC: 236 MG/DL (ref 70–99)
GLUCOSE SERPL-MCNC: 245 MG/DL (ref 70–99)
GLUCOSE SERPL-MCNC: 245 MG/DL (ref 70–99)
GLUCOSE UR STRIP-MCNC: 100 MG/DL
GLUCOSE UR STRIP-MCNC: 50 MG/DL
HBA1C MFR BLD: 8 % (ref 4–6.2)
HCO3 BLDV-SCNC: 19 MMOL/L (ref 21–28)
HCT VFR BLD AUTO: 39.6 % (ref 40–53)
HCT VFR BLD AUTO: 41.8 % (ref 40–53)
HGB BLD-MCNC: 13.6 G/DL (ref 13.3–17.7)
HGB BLD-MCNC: 13.7 G/DL (ref 13.3–17.7)
HGB UR QL STRIP: ABNORMAL
HGB UR QL STRIP: ABNORMAL
HOLD SPECIMEN: NORMAL
HYALINE CASTS: 1 /LPF
HYALINE CASTS: 9 /LPF
IMM GRANULOCYTES # BLD: 0.1 10E3/UL
IMM GRANULOCYTES # BLD: 0.1 10E3/UL
IMM GRANULOCYTES NFR BLD: 1 %
IMM GRANULOCYTES NFR BLD: 1 %
INR POINT OF CARE: 1.9 (ref 0.9–1.1)
INR POINT OF CARE: 1.9 (ref 0.9–1.1)
INR POINT OF CARE: 2.2 (ref 0.9–1.1)
INR POINT OF CARE: 2.3 (ref 0.9–1.1)
INR POINT OF CARE: 2.5 (ref 0.9–1.1)
INR POINT OF CARE: 2.5 (ref 0.9–1.1)
INR PPP: 2.33 (ref 0.85–1.15)
INR PPP: 5.98 (ref 0.85–1.15)
INTERPRETATION ECG - MUSE: NORMAL
INTERPRETATION ECG - MUSE: NORMAL
KETONES UR STRIP-MCNC: NEGATIVE MG/DL
KETONES UR STRIP-MCNC: NEGATIVE MG/DL
LACTATE SERPL-SCNC: 2.4 MMOL/L (ref 0.7–2)
LACTATE SERPL-SCNC: 2.8 MMOL/L (ref 0.7–2)
LACTATE SERPL-SCNC: 2.9 MMOL/L (ref 0.7–2)
LACTATE SERPL-SCNC: 3.2 MMOL/L (ref 0.7–2)
LACTATE SERPL-SCNC: 3.2 MMOL/L (ref 0.7–2)
LACTATE SERPL-SCNC: 3.6 MMOL/L (ref 0.7–2)
LEUKOCYTE ESTERASE UR QL STRIP: ABNORMAL
LEUKOCYTE ESTERASE UR QL STRIP: ABNORMAL
LYMPHOCYTES # BLD AUTO: 1 10E3/UL (ref 0.8–5.3)
LYMPHOCYTES # BLD AUTO: 1.4 10E3/UL (ref 0.8–5.3)
LYMPHOCYTES NFR BLD AUTO: 8 %
LYMPHOCYTES NFR BLD AUTO: 9 %
MAGNESIUM SERPL-MCNC: 2 MG/DL (ref 1.7–2.3)
MCH RBC QN AUTO: 33.7 PG (ref 26.5–33)
MCH RBC QN AUTO: 33.8 PG (ref 26.5–33)
MCHC RBC AUTO-ENTMCNC: 32.8 G/DL (ref 31.5–36.5)
MCHC RBC AUTO-ENTMCNC: 34.3 G/DL (ref 31.5–36.5)
MCV RBC AUTO: 103 FL (ref 78–100)
MCV RBC AUTO: 99 FL (ref 78–100)
MONOCYTES # BLD AUTO: 0.8 10E3/UL (ref 0–1.3)
MONOCYTES # BLD AUTO: 1.3 10E3/UL (ref 0–1.3)
MONOCYTES NFR BLD AUTO: 7 %
MONOCYTES NFR BLD AUTO: 9 %
MUCOUS THREADS #/AREA URNS LPF: PRESENT /LPF
MUCOUS THREADS #/AREA URNS LPF: PRESENT /LPF
NEUTROPHILS # BLD AUTO: 10 10E3/UL (ref 1.6–8.3)
NEUTROPHILS # BLD AUTO: 12.5 10E3/UL (ref 1.6–8.3)
NEUTROPHILS NFR BLD AUTO: 80 %
NEUTROPHILS NFR BLD AUTO: 82 %
NITRATE UR QL: NEGATIVE
NITRATE UR QL: NEGATIVE
NRBC # BLD AUTO: 0 10E3/UL
NRBC # BLD AUTO: 0 10E3/UL
NRBC BLD AUTO-RTO: 0 /100
NRBC BLD AUTO-RTO: 0 /100
O2/TOTAL GAS SETTING VFR VENT: 21 %
OXYHGB MFR BLDV: 55 % (ref 70–75)
P AXIS - MUSE: NORMAL DEGREES
P AXIS - MUSE: NORMAL DEGREES
PCO2 BLDV: 42 MM HG (ref 40–50)
PH BLDV: 7.28 [PH] (ref 7.32–7.43)
PH UR STRIP: 5 [PH] (ref 5–9)
PH UR STRIP: 5 [PH] (ref 5–9)
PLATELET # BLD AUTO: 136 10E3/UL (ref 150–450)
PLATELET # BLD AUTO: 150 10E3/UL (ref 150–450)
PO2 BLDV: 37 MM HG (ref 25–47)
POTASSIUM SERPL-SCNC: 4.4 MMOL/L (ref 3.4–5.3)
POTASSIUM SERPL-SCNC: 4.4 MMOL/L (ref 3.4–5.3)
POTASSIUM SERPL-SCNC: 4.6 MMOL/L (ref 3.4–5.3)
POTASSIUM SERPL-SCNC: 5.5 MMOL/L (ref 3.4–5.3)
PR INTERVAL - MUSE: NORMAL MS
PR INTERVAL - MUSE: NORMAL MS
PROCALCITONIN SERPL IA-MCNC: 0.35 NG/ML
PROT SERPL-MCNC: 6.1 G/DL (ref 6.4–8.3)
PROT SERPL-MCNC: 6.4 G/DL (ref 6.4–8.3)
PROT SERPL-MCNC: 6.7 G/DL (ref 6.4–8.3)
QRS DURATION - MUSE: 164 MS
QRS DURATION - MUSE: 172 MS
QT - MUSE: 452 MS
QT - MUSE: 484 MS
QTC - MUSE: 562 MS
QTC - MUSE: 580 MS
R AXIS - MUSE: 256 DEGREES
R AXIS - MUSE: 270 DEGREES
RBC # BLD AUTO: 4.02 10E6/UL (ref 4.4–5.9)
RBC # BLD AUTO: 4.07 10E6/UL (ref 4.4–5.9)
RBC URINE: 13 /HPF
RBC URINE: 6 /HPF
RSV RNA SPEC NAA+PROBE: NEGATIVE
SARS-COV-2 RNA RESP QL NAA+PROBE: NEGATIVE
SODIUM SERPL-SCNC: 132 MMOL/L (ref 136–145)
SODIUM SERPL-SCNC: 134 MMOL/L (ref 136–145)
SODIUM SERPL-SCNC: 134 MMOL/L (ref 136–145)
SODIUM SERPL-SCNC: 135 MMOL/L (ref 136–145)
SP GR UR STRIP: 1.01 (ref 1–1.03)
SP GR UR STRIP: >=1.03 (ref 1–1.03)
SYSTOLIC BLOOD PRESSURE - MUSE: NORMAL MMHG
SYSTOLIC BLOOD PRESSURE - MUSE: NORMAL MMHG
T AXIS - MUSE: 88 DEGREES
T AXIS - MUSE: 89 DEGREES
UROBILINOGEN UR STRIP-MCNC: NORMAL MG/DL
UROBILINOGEN UR STRIP-MCNC: NORMAL MG/DL
VENTRICULAR RATE- MUSE: 81 BPM
VENTRICULAR RATE- MUSE: 99 BPM
WBC # BLD AUTO: 12.1 10E3/UL (ref 4–11)
WBC # BLD AUTO: 15.5 10E3/UL (ref 4–11)
WBC URINE: 10 /HPF
WBC URINE: 2 /HPF

## 2023-01-01 PROCEDURE — 97112 NEUROMUSCULAR REEDUCATION: CPT | Mod: GP

## 2023-01-01 PROCEDURE — 250N000011 HC RX IP 250 OP 636: Mod: JZ | Performed by: FAMILY MEDICINE

## 2023-01-01 PROCEDURE — 97110 THERAPEUTIC EXERCISES: CPT | Mod: GP

## 2023-01-01 PROCEDURE — 85610 PROTHROMBIN TIME: CPT | Mod: ZL,QW

## 2023-01-01 PROCEDURE — 258N000003 HC RX IP 258 OP 636: Performed by: FAMILY MEDICINE

## 2023-01-01 PROCEDURE — 36415 COLL VENOUS BLD VENIPUNCTURE: CPT | Performed by: FAMILY MEDICINE

## 2023-01-01 PROCEDURE — 97116 GAIT TRAINING THERAPY: CPT | Mod: GP

## 2023-01-01 PROCEDURE — 83605 ASSAY OF LACTIC ACID: CPT | Mod: 91 | Performed by: FAMILY MEDICINE

## 2023-01-01 PROCEDURE — 97140 MANUAL THERAPY 1/> REGIONS: CPT | Mod: GP

## 2023-01-01 PROCEDURE — 99285 EMERGENCY DEPT VISIT HI MDM: CPT | Performed by: FAMILY MEDICINE

## 2023-01-01 PROCEDURE — 250N000012 HC RX MED GY IP 250 OP 636 PS 637: Performed by: FAMILY MEDICINE

## 2023-01-01 PROCEDURE — 96368 THER/DIAG CONCURRENT INF: CPT | Performed by: FAMILY MEDICINE

## 2023-01-01 PROCEDURE — 36416 COLLJ CAPILLARY BLOOD SPEC: CPT | Performed by: FAMILY MEDICINE

## 2023-01-01 PROCEDURE — 85610 PROTHROMBIN TIME: CPT | Performed by: FAMILY MEDICINE

## 2023-01-01 PROCEDURE — 87637 SARSCOV2&INF A&B&RSV AMP PRB: CPT | Performed by: FAMILY MEDICINE

## 2023-01-01 PROCEDURE — 96365 THER/PROPH/DIAG IV INF INIT: CPT | Performed by: FAMILY MEDICINE

## 2023-01-01 PROCEDURE — 99285 EMERGENCY DEPT VISIT HI MDM: CPT | Mod: 25 | Performed by: FAMILY MEDICINE

## 2023-01-01 PROCEDURE — 93010 ELECTROCARDIOGRAM REPORT: CPT | Performed by: STUDENT IN AN ORGANIZED HEALTH CARE EDUCATION/TRAINING PROGRAM

## 2023-01-01 PROCEDURE — 93005 ELECTROCARDIOGRAM TRACING: CPT | Performed by: FAMILY MEDICINE

## 2023-01-01 PROCEDURE — 84450 TRANSFERASE (AST) (SGOT): CPT | Performed by: FAMILY MEDICINE

## 2023-01-01 PROCEDURE — 87086 URINE CULTURE/COLONY COUNT: CPT | Performed by: FAMILY MEDICINE

## 2023-01-01 PROCEDURE — 250N000011 HC RX IP 250 OP 636: Performed by: FAMILY MEDICINE

## 2023-01-01 PROCEDURE — 96361 HYDRATE IV INFUSION ADD-ON: CPT | Performed by: FAMILY MEDICINE

## 2023-01-01 PROCEDURE — 36416 COLLJ CAPILLARY BLOOD SPEC: CPT | Mod: ZL

## 2023-01-01 PROCEDURE — 71046 X-RAY EXAM CHEST 2 VIEWS: CPT

## 2023-01-01 PROCEDURE — 97530 THERAPEUTIC ACTIVITIES: CPT | Mod: GP

## 2023-01-01 PROCEDURE — 83605 ASSAY OF LACTIC ACID: CPT | Performed by: FAMILY MEDICINE

## 2023-01-01 PROCEDURE — 87040 BLOOD CULTURE FOR BACTERIA: CPT | Performed by: FAMILY MEDICINE

## 2023-01-01 PROCEDURE — 96374 THER/PROPH/DIAG INJ IV PUSH: CPT | Performed by: FAMILY MEDICINE

## 2023-01-01 PROCEDURE — 76705 ECHO EXAM OF ABDOMEN: CPT

## 2023-01-01 PROCEDURE — G0463 HOSPITAL OUTPT CLINIC VISIT: HCPCS | Mod: 25 | Performed by: FAMILY MEDICINE

## 2023-01-01 PROCEDURE — 83036 HEMOGLOBIN GLYCOSYLATED A1C: CPT | Mod: ZL | Performed by: FAMILY MEDICINE

## 2023-01-01 PROCEDURE — 82962 GLUCOSE BLOOD TEST: CPT | Mod: 91

## 2023-01-01 PROCEDURE — 81001 URINALYSIS AUTO W/SCOPE: CPT | Performed by: FAMILY MEDICINE

## 2023-01-01 PROCEDURE — 80053 COMPREHEN METABOLIC PANEL: CPT | Performed by: FAMILY MEDICINE

## 2023-01-01 PROCEDURE — 85025 COMPLETE CBC W/AUTO DIFF WBC: CPT | Performed by: FAMILY MEDICINE

## 2023-01-01 PROCEDURE — 82805 BLOOD GASES W/O2 SATURATION: CPT | Performed by: FAMILY MEDICINE

## 2023-01-01 PROCEDURE — 96375 TX/PRO/DX INJ NEW DRUG ADDON: CPT | Performed by: FAMILY MEDICINE

## 2023-01-01 PROCEDURE — 99214 OFFICE O/P EST MOD 30 MIN: CPT | Performed by: FAMILY MEDICINE

## 2023-01-01 PROCEDURE — 85004 AUTOMATED DIFF WBC COUNT: CPT | Performed by: FAMILY MEDICINE

## 2023-01-01 PROCEDURE — 96366 THER/PROPH/DIAG IV INF ADDON: CPT | Performed by: FAMILY MEDICINE

## 2023-01-01 PROCEDURE — C9803 HOPD COVID-19 SPEC COLLECT: HCPCS | Performed by: FAMILY MEDICINE

## 2023-01-01 PROCEDURE — 84145 PROCALCITONIN (PCT): CPT | Performed by: FAMILY MEDICINE

## 2023-01-01 PROCEDURE — 36415 COLL VENOUS BLD VENIPUNCTURE: CPT | Mod: ZL | Performed by: FAMILY MEDICINE

## 2023-01-01 PROCEDURE — 83735 ASSAY OF MAGNESIUM: CPT | Performed by: FAMILY MEDICINE

## 2023-01-01 PROCEDURE — 74176 CT ABD & PELVIS W/O CONTRAST: CPT | Mod: TC

## 2023-01-01 PROCEDURE — 71045 X-RAY EXAM CHEST 1 VIEW: CPT

## 2023-01-01 RX ORDER — CEFAZOLIN SODIUM 1 G/50ML
2000 SOLUTION INTRAVENOUS ONCE
Status: COMPLETED | OUTPATIENT
Start: 2023-01-01 | End: 2023-01-01

## 2023-01-01 RX ORDER — PIPERACILLIN SODIUM, TAZOBACTAM SODIUM 4; .5 G/20ML; G/20ML
4.5 INJECTION, POWDER, LYOPHILIZED, FOR SOLUTION INTRAVENOUS ONCE
Status: DISCONTINUED | OUTPATIENT
Start: 2023-01-01 | End: 2023-01-01 | Stop reason: DRUGHIGH

## 2023-01-01 RX ORDER — CALCIUM GLUCONATE 94 MG/ML
1 INJECTION, SOLUTION INTRAVENOUS ONCE
Status: COMPLETED | OUTPATIENT
Start: 2023-01-01 | End: 2023-01-01

## 2023-01-01 RX ORDER — ATORVASTATIN CALCIUM 40 MG/1
40 TABLET, FILM COATED ORAL DAILY
Qty: 90 TABLET | Refills: 4 | Status: SHIPPED | OUTPATIENT
Start: 2023-01-01

## 2023-01-01 RX ORDER — PIPERACILLIN SODIUM, TAZOBACTAM SODIUM 2; .25 G/10ML; G/10ML
2.25 INJECTION, POWDER, LYOPHILIZED, FOR SOLUTION INTRAVENOUS ONCE
Status: COMPLETED | OUTPATIENT
Start: 2023-01-01 | End: 2023-01-01

## 2023-01-01 RX ORDER — HYDROMORPHONE HYDROCHLORIDE 1 MG/ML
0.5 INJECTION, SOLUTION INTRAMUSCULAR; INTRAVENOUS; SUBCUTANEOUS EVERY 30 MIN PRN
Status: DISCONTINUED | OUTPATIENT
Start: 2023-01-01 | End: 2023-01-01 | Stop reason: HOSPADM

## 2023-01-01 RX ORDER — METOPROLOL SUCCINATE 25 MG/1
12.5 TABLET, EXTENDED RELEASE ORAL AT BEDTIME
COMMUNITY
Start: 2023-01-01

## 2023-01-01 RX ADMIN — SODIUM CHLORIDE 500 ML: 9 INJECTION, SOLUTION INTRAVENOUS at 04:31

## 2023-01-01 RX ADMIN — INSULIN HUMAN 10 UNITS: 100 INJECTION, SOLUTION PARENTERAL at 01:43

## 2023-01-01 RX ADMIN — PIPERACILLIN AND TAZOBACTAM 2.25 G: 2; .25 INJECTION, POWDER, LYOPHILIZED, FOR SOLUTION INTRAVENOUS at 08:42

## 2023-01-01 RX ADMIN — SODIUM CHLORIDE 1000 ML: 9 INJECTION, SOLUTION INTRAVENOUS at 10:18

## 2023-01-01 RX ADMIN — VANCOMYCIN HYDROCHLORIDE 2000 MG: 1 INJECTION, POWDER, LYOPHILIZED, FOR SOLUTION INTRAVENOUS at 09:58

## 2023-01-01 RX ADMIN — SODIUM CHLORIDE 1000 ML: 9 INJECTION, SOLUTION INTRAVENOUS at 08:16

## 2023-01-01 RX ADMIN — CALCIUM GLUCONATE 1 G: 98 INJECTION, SOLUTION INTRAVENOUS at 01:30

## 2023-01-01 RX ADMIN — HYDROMORPHONE HYDROCHLORIDE 0.5 MG: 1 INJECTION, SOLUTION INTRAMUSCULAR; INTRAVENOUS; SUBCUTANEOUS at 04:55

## 2023-01-01 RX ADMIN — SODIUM CHLORIDE 1000 ML: 9 INJECTION, SOLUTION INTRAVENOUS at 01:32

## 2023-01-01 ASSESSMENT — ACTIVITIES OF DAILY LIVING (ADL)
ADLS_ACUITY_SCORE: 35

## 2023-01-01 ASSESSMENT — ENCOUNTER SYMPTOMS
CONSTIPATION: 0
VOMITING: 1
DIARRHEA: 1
NAUSEA: 0
ANAL BLEEDING: 1
FATIGUE: 1
TREMORS: 0
WOUND: 1
DIZZINESS: 0
DIARRHEA: 0
WEAKNESS: 1
DYSURIA: 0

## 2023-01-01 ASSESSMENT — PAIN SCALES - GENERAL: PAINLEVEL: EXTREME PAIN (8)
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
## 2023-02-02 NOTE — PROGRESS NOTES
ANTICOAGULATION MANAGEMENT     Miguel Parisi 67 year old male is on warfarin with subtherapeutic INR result. (Goal INR 2.0-3.0)    Recent labs: (last 7 days)     02/02/23  1413   INR 1.9*       ASSESSMENT       Source(s): Chart Review and In person        Warfarin doses taken: Warfarin taken as instructed    Diet: No new diet changes identified    New illness, injury, or hospitalization: No    Medication/supplement changes: None noted    Signs or symptoms of bleeding or clotting: No    Previous INR: Therapeutic last 2(+) visits    Additional findings: None       PLAN     Recommended plan for no diet, medication or health factor changes affecting INR     Dosing Instructions: Continue your current warfarin dose with next INR in 4 weeks       Summary  As of 2/2/2023    Full warfarin instructions:  2 mg every Mon, Fri; 3 mg all other days   Next INR check:  3/2/2023             In person    Lab visit scheduled    Education provided: Please call back if any changes to your diet, medications or how you've been taking warfarin    Plan made per [facility] anticoagulation protocol    Radha Huggins RN  Anticoagulation Clinic  2/2/2023    _______________________________________________________________________     Anticoagulation Episode Summary     Current INR goal:  2.0-3.0   TTR:  63.3 % (1 y)   Target end date:  8/10/2027   Send INR reminders to:  [facility]    Indications    PAD (peripheral artery disease) (H) [I73.9]  History of cerebrovascular accident on 11/16/2020 [Z86.73]  History of ITP on 4/16/2021 [Z86.2]  H/O STEMI on 4/15/2021 [I25.2]  History of cardiac arrest [Z86.74]  Atherosclerosis of native coronary artery of native heart without angina pectoris [I25.10]  Anticoagulation monitoring  INR range 2-3 [Z79.01]           Comments:           Anticoagulation Care Providers     Provider Role Specialty Phone number    Danii Ortiz NP Referring Internal Medicine [phone]    Yari Yin DO  Referring Internal Medicine [phone]

## 2023-02-28 NOTE — PROGRESS NOTES
ANTICOAGULATION MANAGEMENT     Miguel Parisi 67 year old male is on warfarin with subtherapeutic INR result. (Goal INR 2.0-3.0)    Recent labs: (last 7 days)     02/28/23  1321   INR 1.9*       ASSESSMENT       Source(s): Chart Review and In person       Warfarin doses taken: Warfarin taken as instructed    Diet: No new diet changes identified    New illness, injury, or hospitalization: No    Medication/supplement changes: None noted    Signs or symptoms of bleeding or clotting: No    Previous INR: Subtherapeutic    Additional findings: None       PLAN     Recommended plan for no diet, medication or health factor changes affecting INR     Dosing Instructions: Increase your warfarin dose (5.3% change) with next INR in 2 weeks       Summary  As of 2/28/2023    Full warfarin instructions:  4 mg every Mon, Wed, Fri; 2 mg all other days   Next INR check:  3/14/2023             In person    Lab visit scheduled    Education provided: Please call back if any changes to your diet, medications or how you've been taking warfarin    Plan made per [facility]agulation protocol    Radha Segura RN  Anticoagulation Clinic  2/28/2023    _______________________________________________________________________     Anticoagulation Episode Summary     Current INR goal:  2.0-3.0   TTR:  56.2 % (1 y)   Target end date:  8/10/2027   Send INR reminders to:  [facility]    Indications    PAD (peripheral artery disease) (H) [I73.9]  History of cerebrovascular accident on 11/16/2020 [Z86.73]  History of ITP on 4/16/2021 [Z86.2]  H/O STEMI on 4/15/2021 [I25.2]  History of cardiac arrest [Z86.74]  Atherosclerosis of native coronary artery of native heart without angina pectoris [I25.10]  Anticoagulation monitoring  INR range 2-3 [Z79.01]           Comments:           Anticoagulation Care Providers     Provider Role Specialty Phone number    Danii Ortiz NP Referring Internal Medicine [phone]    Yari Yin,  DO Referring Internal Medicine [phone]

## 2023-03-15 NOTE — PROGRESS NOTES
[facility]    OUTPATIENT PHYSICAL THERAPY  PLAN OF TREATMENT FOR OUTPATIENT REHABILITATION AND PROGRESS NOTE           Patient's Last Name, First Name, Miguel Mccullough Date of Birth  1955   Provider's Name  [facility] Medical Record No.  [id number]    Onset Date  5/31/22 Start of Care Date  9/13/22   Type:     _X_PT   ___OT   ___SLP Medical Diagnosis  Transmetatarsal amputation, hx of BKA   PT Diagnosis  Impaired mobility; weakness; impaired balance Plan of Treatment  Frequency/Duration: 1-2x/week  for 12 weeks  Certification date from 3/2/23 to 5/25/23     Goals:  Goal Identifier Ambulation   Goal Description Pt will be able to ambulate 150 feet with FWW for improved mobility at home   Target Date 10/19/22   Date Met  12/06/22   Progress (detail required for progress note):       Goal Identifier Prosthesis   Goal Description Pt will be able to don and doff his prosthesis independently from proper wear and avoiding skin breakdown.   Target Date 10/19/22   Date Met  10/27/22   Progress (detail required for progress note):       Goal Identifier Transfers   Goal Description Kolton will report transferring only with sit to stands instead of slide board use.   Target Date 11/02/22   Date Met  12/06/22   Progress (detail required for progress note):      Goal Identifier Ambulation   Goal Description Kolton will be able to ambulate 500 feet with cane for improving community mobility.   Target Date 11/23/22   Date Met      Progress (detail required for progress note): not ambulating with a cane yet. Attempts made for 3-5 feet but Kolton feeling uneasy and needing assist to prevent LOB     Goal Identifier NEW GOAL - Standing   Goal Description Kolton will be abl to stand without support for 2 minutes without LOB for improved safety and decreasing fall risk.   Target Date  04/13/23   Date Met      Progress (detail required for progress note):  Progressing well. Up to 1 minute of unsupported standing. Quite a bit less time if variables changed including head turns/nods     Goal Identifier NEW GOAL - Sit to stands   Goal Description Kolton will be able to complete sit to stand from standard chair with or without armrests for improved LE strength and function.   Target Date 04/27/23   Date Met      Progress (detail required for progress note):  Currently needs armrests and L LE extended     Goal Identifier NEW GOAL - Stairs   Goal Description Kolton will be able to navigate 5 steps with step to pattern and use of handrails for improved community ambulation.   Target Date 05/25/23   Date Met      Progress (detail required for progress note):  Has progressed to intermittent stair climbing with pain at times. Is not able to lead up with LLE         Beginning/End Dates of Progress Note Reporting Period:  1/19/23 to 3/2/23    Progress Toward Goals:   Progress this reporting period: Significant progress being made. Kolton continues to be more steady in stance. He is able to tolerate longer durations of activity. Balance remains significantly challenged when we use a cane for ambulation.     Client Self (Subjective) Report for Progress Note Reporting Period: Kolton stood to use the bathroom for the first time. His grandson was surprised he could do that. Kolton feels like his prosthesis is too long.           I CERTIFY THE NEED FOR THESE SERVICES FURNISHED UNDER        THIS PLAN OF TREATMENT AND WHILE UNDER MY CARE     (Physician co-signature of this document indicates review and certification of the therapy plan).                Referring Provider: DO Robel Cevallos, PT

## 2023-03-28 NOTE — PROGRESS NOTES
ANTICOAGULATION MANAGEMENT     Miguel Parisi 67 year old male is on warfarin with therapeutic INR result. (Goal INR 2.0-3.0)    Recent labs: (last 7 days)     03/28/23  1240   INR 2.2*       ASSESSMENT       Source(s): Chart Review and In person       Warfarin doses taken: Warfarin taken as instructed    Diet: No new diet changes identified    New illness, injury, or hospitalization: No    Medication/supplement changes: None noted    Signs or symptoms of bleeding or clotting: No    Previous INR: Subtherapeutic    Additional findings: None       PLAN     Recommended plan for no diet, medication or health factor changes affecting INR     Dosing Instructions: Continue your current warfarin dose with next INR in 5 weeks       Summary  As of 3/28/2023    Full warfarin instructions:  4 mg every Mon, Wed, Fri; 2 mg all other days   Next INR check:  5/2/2023             In person    Lab visit scheduled    Education provided: Please call back if any changes to your diet, medications or how you've been taking warfarin    Plan made per [facility] anticoagulation protocol    Radha Segura RN  Anticoagulation Clinic  3/28/2023    _______________________________________________________________________     Anticoagulation Episode Summary     Current INR goal:  2.0-3.0   TTR:  53.7 % (1 y)   Target end date:  8/10/2027   Send INR reminders to:  [facility]    Indications    PAD (peripheral artery disease) (H) [I73.9]  History of cerebrovascular accident on 11/16/2020 [Z86.73]  History of ITP on 4/16/2021 [Z86.2]  H/O STEMI on 4/15/2021 [I25.2]  History of cardiac arrest [Z86.74]  Atherosclerosis of native coronary artery of native heart without angina pectoris [I25.10]  Anticoagulation monitoring  INR range 2-3 [Z79.01]           Comments:           Anticoagulation Care Providers     Provider Role Specialty Phone number    Danii Ortiz NP Referring Internal Medicine [phone]    Yari Yin DO  Referring Internal Medicine [phone]

## 2023-05-11 NOTE — PROGRESS NOTES
ANTICOAGULATION MANAGEMENT     Miguel Parisi 67 year old male is on warfarin with therapeutic INR result. (Goal INR 2.0-3.0)    Recent labs: (last 7 days)     05/11/23  1245   INR 2.3*       ASSESSMENT       Source(s): Chart Review       Warfarin doses taken: Warfarin taken as instructed    Diet: No new diet changes identified    New illness, injury, or hospitalization: No    Medication/supplement changes: None noted    Signs or symptoms of bleeding or clotting: No    Previous INR: Therapeutic last 2(+) visits    Additional findings: None       PLAN     Recommended plan for no diet, medication or health factor changes affecting INR     Dosing Instructions: Continue your current warfarin dose with next INR in 6 weeks       Summary  As of 5/11/2023    Full warfarin instructions:  4 mg every Mon, Wed, Fri; 2 mg all other days   Next INR check:  6/22/2023             Patient elected to schedule next visit at check out    Education provided: Written instructions provided    Plan made per [facility]agulation protocol    Abby Scherer RN  Anticoagulation Clinic  5/11/2023    _______________________________________________________________________     Anticoagulation Episode Summary     Current INR goal:  2.0-3.0   TTR:  54.0 % (1 y)   Target end date:  8/10/2027   Send INR reminders to:  [facility]    Indications    PAD (peripheral artery disease) (H) [I73.9]  History of cerebrovascular accident on 11/16/2020 [Z86.73]  History of ITP on 4/16/2021 [Z86.2]  H/O STEMI on 4/15/2021 [I25.2]  History of cardiac arrest [Z86.74]  Atherosclerosis of native coronary artery of native heart without angina pectoris [I25.10]  Anticoagulation monitoring  INR range 2-3 [Z79.01]           Comments:           Anticoagulation Care Providers     Provider Role Specialty Phone number    Danii Ortiz NP Referring Internal Medicine [phone]    Yari Yin DO Referring Internal Medicine [phone]

## 2023-05-14 NOTE — PROGRESS NOTES
[facility]    Outpatient Physical Therapy Progress Note  Patient: Miguel Parisi  : 1955    Beginning/End Dates of Reporting Period:  3/7/2023 to     Referring Provider: Dr. Yin,     Therapy Diagnosis: s/p L BKA; R transmetatarsal amputation     Client Self Report: Kolton reports he was able to stand for the first time while dressing himselft    Goals:  Goal Identifier Ambulation   Goal Description Pt will be able to ambulate 150 feet with FWW for improved mobility at home   Target Date 10/19/22   Date Met  22   Progress (detail required for progress note):       Goal Identifier Prosthesis   Goal Description Pt will be able to don and doff his prosthesis independently from proper wear and avoiding skin breakdown.   Target Date 10/19/22   Date Met  10/27/22   Progress (detail required for progress note):       Goal Identifier Transfers   Goal Description Kolton will report transferring only with sit to stands instead of slide board use.   Target Date 22   Date Met  22   Progress (detail required for progress note):       Goal Identifier Ambulation   Goal Description Kolton will be able to ambulate 500 feet with cane for improving community mobility.   Target Date 22   Date Met      Progress (detail required for progress note): Able to ambulate 75 feet with single hand rail     Goal Identifier Standing   Goal Description Kolton will be able to stand without support for 2 minutes without LOB for improved safety and decreasing fall risk.   Target Date 23   Date Met      Progress (detail required for progress note): Kolton has demonstrated 1:45 duration of stance before loss of balance. Continue     Goal Identifier Sit to stands   Goal Description Kolton will be able to complete sit to stand from standard chair with or without armrests for improved LE strength and function.   Target  Date 04/27/23   Date Met      Progress (detail required for progress note): Difficulty from standard height chair.     Goal Identifier Stairs   Goal Description Kolton will be able to navigate 5 steps with step to pattern and use of handrails for improved community ambulation.   Target Date 05/25/23   Date Met  05/02/23   Progress (detail required for progress note):           Plan:  Continue therapy per current plan of care. Kolton continues to make some small steps of progress. He has shown improved ability to ambulate over various surfaces as well as spend more time standing unsupported without US support. He still lacks the ability to ambulate with a cane due to inability to WB through his L LE without UE support.     Discharge:  No

## 2023-05-25 PROBLEM — Z44.9: Status: ACTIVE | Noted: 2023-01-01

## 2023-07-07 NOTE — PROGRESS NOTES
ANTICOAGULATION MANAGEMENT     Miguel Parisi 67 year old male is on warfarin with therapeutic INR result. (Goal INR 2.0-3.0)    Recent labs: (last 7 days)     07/07/23  0810   INR 2.5*       ASSESSMENT       Source(s): Chart Review and In person       Warfarin doses taken: Warfarin taken as instructed    Diet: No new diet changes identified    New illness, injury, or hospitalization: No    Medication/supplement changes: None noted    Signs or symptoms of bleeding or clotting: No    Previous INR: Therapeutic last 2(+) visits    Additional findings: None       PLAN     Recommended plan for no diet, medication or health factor changes affecting INR     Dosing Instructions: Continue your current warfarin dose with next INR in 6 weeks       Summary  As of 7/7/2023    Full warfarin instructions:  4 mg every Mon, Wed, Fri; 2 mg all other days   Next INR check:  8/18/2023             In person    Patient offered & declined to schedule next visit    Education provided: Please call back if any changes to your diet, medications or how you've been taking warfarin    Plan made per [facility]agulation protocol    Radha Segura RN  Anticoagulation Clinic  7/7/2023    _______________________________________________________________________     Anticoagulation Episode Summary     Current INR goal:  2.0-3.0   TTR:  56.9 % (1 y)   Target end date:  8/10/2027   Send INR reminders to:  [facility]    Indications    PAD (peripheral artery disease) (H) [I73.9]  History of cerebrovascular accident on 11/16/2020 [Z86.73]  History of ITP on 4/16/2021 [Z86.2]  H/O STEMI on 4/15/2021 [I25.2]  History of cardiac arrest [Z86.74]  Atherosclerosis of native coronary artery of native heart without angina pectoris [I25.10]  Anticoagulation monitoring  INR range 2-3 [Z79.01]           Comments:           Anticoagulation Care Providers     Provider Role Specialty Phone number    Danii Ortiz NP Referring Internal  Medicine [phone]    Yari Yin DO [facility] Internal Medicine [phone]

## 2023-07-14 NOTE — PROGRESS NOTES
[facility]                                                                                   OUTPATIENT PHYSICAL THERAPY    PLAN OF TREATMENT FOR OUTPATIENT REHABILITATION   Patient's Last Name, First Name, Miguel Mccullough YOB: 1955   Provider's Name   [facility]   Medical Record No.  [id number]     Onset Date: 07/24/22  Start of Care Date: 09/13/22     Medical Diagnosis:  s/p BKA L, s/p R transmetatarsal amputation      PT Treatment Diagnosis:  impaired mobility, weakness, poor balance, Plan of Treatment  Frequency/Duration: 2x/week/ 12 weeks    Certification date from 05/25/23 to 08/17/23         See note for plan of treatment details and functional goals     Robel Altamirano, PT                         I CERTIFY THE NEED FOR THESE SERVICES FURNISHED UNDER        THIS PLAN OF TREATMENT AND WHILE UNDER MY CARE     (Physician attestation of this document indicates review and certification of the therapy plan).                Referring Provider:  Yari Yin      Initial Assessment  See Epic Evaluation- Start of Care Date: 09/13/22            PLAN  Miguel has gotten stronger but to this point we have started to plateau in progress. We are going to finish up our remaining scheduled appts with plan to transition to home programming. [place] is comfortable with this plan.    Beginning/End Dates of Progress Note Reporting Period:    5/9/23 to 07/11/2023    Referring Provider:  Yari Yin

## 2023-09-01 NOTE — PROGRESS NOTES
ANTICOAGULATION MANAGEMENT     Miguel Parisi 67 year old male is on warfarin with therapeutic INR result. (Goal INR 2.0-3.0)    Recent labs: (last 7 days)     09/01/23  0839   INR 2.5*       ASSESSMENT     Source(s): Chart Review and In person      Warfarin doses taken: Warfarin taken as instructed  Diet: No new diet changes identified  New illness, injury, or hospitalization: No  Medication/supplement changes: None noted  Signs or symptoms of bleeding or clotting: No  Previous INR: Therapeutic last 2(+) visits  Additional findings: None     PLAN     Recommended plan for no diet, medication or health factor changes affecting INR     Dosing Instructions: Continue your current warfarin dose with next INR in 6 weeks       Summary  As of 9/1/2023      Full warfarin instructions:  4 mg every Mon, Wed, Fri; 2 mg all other days   Next INR check:  10/13/2023               In person    Lab visit scheduled    Education provided: Please call back if any changes to your diet, medications or how you've been taking warfarin    Plan made per [facility] anticoagulation protocol    Radha Segura RN  Anticoagulation Clinic  9/1/2023    _______________________________________________________________________     Anticoagulation Episode Summary       Current INR goal:  2.0-3.0   TTR:  69.7 % (1 y)   Target end date:  8/10/2027   Send INR reminders to:  [facility]    Indications    PAD (peripheral artery disease) (H) [I73.9]  History of cerebrovascular accident on 11/16/2020 [Z86.73]  History of ITP on 4/16/2021 [Z86.2]  H/O STEMI on 4/15/2021 [I25.2]  History of cardiac arrest [Z86.74]  Atherosclerosis of native coronary artery of native heart without angina pectoris [I25.10]  Anticoagulation monitoring  INR range 2-3 [Z79.01]             Comments:               Anticoagulation Care Providers       Provider Role Specialty Phone number    Danii Ortiz NP Referring Internal Medicine [phone]    Yari Yin,  DO Referring Internal Medicine [phone]

## 2023-09-19 PROBLEM — Z99.2 DIALYSIS PATIENT (H): Status: ACTIVE | Noted: 2023-01-01

## 2023-09-19 NOTE — NURSING NOTE
Patient here for chest congestion for the last week. He is not taking meds as prescribed. He takes  1/2 of carvediol and his coumadin. He has not seen a doctor including cardiologist in 2 years. Medication Reconciliation: complete.    Cara Hawkins LPN  9/19/2023 11:12 AM

## 2023-09-19 NOTE — PROGRESS NOTES
SUBJECTIVE:   Miguel Parisi is a 67 year old male who presents to clinic today for the following health issues: Nasal congestion shortness of breath dry heaves.    Patient arrives here with the above complaints.  States has been coughing and has congestion in the back of his throat that he has trouble getting out.  Has not been seen in the clinic for over 2 years.  Chart lists multiple medications.  But the states he is stopped all medications except for carvedilol and Coumadin.  Patient has not followed up with his primary physician for over 20 years.  He states he has not followed up with nephrology.  He is not on dialysis.  He is very tangential.  Has difficulty following the conversation.  And questions.  When asked whether he is taking the medication and he seems to change the subject.    History of Present Illness       Reason for visit:  Not feeling well  Symptom onset:  3-7 days ago  Symptoms include:  Not feeling well  Symptom intensity:  Mild  Symptom progression:  Staying the same  Had these symptoms before:  No    He eats 0-1 servings of fruits and vegetables daily.He consumes 1 sweetened beverage(s) daily.He exercises with enough effort to increase his heart rate 9 or less minutes per day.  He exercises with enough effort to increase his heart rate 3 or less days per week.   He is taking medications regularly.        Patient Active Problem List    Diagnosis Date Noted    Dialysis patient (H) 09/19/2023     Priority: Medium    Adjustment of prosthetic device 05/25/2023     Priority: Medium    Anticoagulation monitoring, INR range 2-3 07/01/2022     Priority: Medium    Hx of BKA, left (H) 04/05/2022     Priority: Medium    Coronary artery disease involving autologous artery coronary bypass graft with angina pectoris (H) 02/25/2022     Priority: Medium    Left facial numbness 01/11/2022     Priority: Medium    Peripheral edema 01/11/2022     Priority: Medium    Hypertriglyceridemia 01/11/2022      Priority: Medium    On Coumadin for atrial fibrillation (H) 01/11/2022     Priority: Medium    ESRD (end stage renal disease) on dialysis (H) 10/05/2021     Priority: Medium    H/O NSTEMI on 7/16/2021 08/17/2021     Priority: Medium    History of tobacco abuse 08/17/2021     Priority: Medium    Tobacco abuse counseling 08/17/2021     Priority: Medium    Paroxysmal A-fib (H) 08/17/2021     Priority: Medium    Hyperphosphatemia 07/27/2021     Priority: Medium    Acquired thrombocytopenia (H) 07/25/2021     Priority: Medium    Generalized ischemic myocardial dysfunction 07/25/2021     Priority: Medium    History of coronary artery bypass surgery 07/23/2021     Priority: Medium    H/O STEMI on 4/15/2021 06/17/2021     Priority: Medium    Morbid obesity (H) 06/17/2021     Priority: Medium    Chronic combined systolic (congestive) and diastolic (congestive) heart failure (H) 06/17/2021     Priority: Medium    Clinical diagnosis of COVID-19 on 11/9/2020 06/17/2021     Priority: Medium    Mild pulmonary hypertension (H) 06/17/2021     Priority: Medium    History of ITP on 4/16/2021 06/17/2021     Priority: Medium    Ulcer of right foot, limited to breakdown of skin (H) 06/17/2021     Priority: Medium    COPD-unknown severity.  Declined PFTs.  (H) 06/17/2021     Priority: Medium    Dehiscence of incision, subsequent encounter 06/17/2021     Priority: Medium    Hemiplegia and hemiparesis following cerebral infarction affecting left non-dominant side (H) 06/17/2021     Priority: Medium    Overweight 06/17/2021     Priority: Medium    Chronic systolic congestive heart failure (H) 06/17/2021     Priority: Medium    History of cerebrovascular accident on 11/16/2020 06/14/2021     Priority: Medium    Severe protein-calorie malnutrition (H) 05/24/2021     Priority: Medium     Formatting of this note might be different from the original.  5/24/21 - Encourage intake of meals, nutrition supplements    Formatting of this note might be  different from the original.  Met ASPEN Criteria for severe malnutrition in acute context on 4/6/22      History of cardiac arrest 05/10/2021     Priority: Medium    Atherosclerosis of native coronary artery of native heart without angina pectoris 04/17/2021     Priority: Medium    History of sudden cardiac arrest successfully resuscitated 04/17/2021     Priority: Medium    Atherosclerotic cardiovascular disease 04/17/2021     Priority: Medium    Abnormality of gait as late effect of cerebrovascular accident (CVA) 12/18/2020     Priority: Medium    Ankle weakness 12/18/2020     Priority: Medium    Blood coagulation defect (H) 11/19/2020     Priority: Medium    Bilateral carotid artery stenosis 11/16/2020     Priority: Medium    PAD (peripheral artery disease) (H) 05/09/2019     Priority: Medium    Intermittent claudication (H) 05/09/2019     Priority: Medium    Type 2 diabetes mellitus with microalbuminuria, with long-term current use of insulin (H) 01/28/2019     Priority: Medium     Formatting of this note might be different from the original.  Per 2016 eye exam  Formatting of this note might be different from the original.  Microalbumin/creatinine ratio of 257 on 10/8/2018, per [facility] medical director review.  Formatting of this note might be different from the original.  Formatting of this note might be different from the original.  Per 2016 eye exam    Formatting of this note might be different from the original.  Microalbumin/creatinine ratio of 257 on 10/8/2018, per [facility] medical director review.      KEISHA with noncompliance 10/12/2015     Priority: Medium     Formatting of this note might be different from the original.  Does not wear cpap.  Formatting of this note might be different from the original.  Formatting of this note might be different from the original.  Does not wear cpap.      Depression with anxiety 11/25/2014     Priority: Medium     Formatting of this note might be different from  the original.  Denies depression or anxiety 11/25/20      Osteoarthrosis 11/25/2014     Priority: Medium     Formatting of this note might be different from the original.  IMO Update      Mixed hyperlipidemia 11/21/2008     Priority: Medium     Formatting of this note might be different from the original.  IMO Update 10/11      Primary hypertension 03/01/2006     Priority: Medium       Review of Systems     OBJECTIVE:     Pulse 78   Temp 98.3  F (36.8  C)   Resp 20   Wt 103.2 kg (227 lb 9.6 oz)   SpO2 100%   BMI 32.66 kg/m    Body mass index is 32.66 kg/m .  Physical Exam  Constitutional:       Comments: Patient is in a wheelchair.  Speech tangential.  Jumping from 1 subject to another.  Took about 10 minutes to review his medications with him.  Still not sure of the accuracy.   HENT:      Head: Normocephalic.      Mouth/Throat:      Mouth: Mucous membranes are moist.   Cardiovascular:      Rate and Rhythm: Normal rate and regular rhythm.   Pulmonary:      Effort: Pulmonary effort is normal.      Breath sounds: Normal breath sounds.   Abdominal:      General: Abdomen is flat.   Skin:     General: Skin is warm.   Neurological:      Mental Status: He is alert.         Diagnostic Test Results:  none     ASSESSMENT/PLAN:     Results for orders placed or performed during the hospital encounter of 09/19/23   XR Chest 2 Views     Status: None    Narrative    Procedure:XR CHEST 2 VIEWS    Clinical history:Male, 67 years, Type 2 diabetes mellitus with  microalbuminuria, with long-term current use of insulin (H); Type 2  diabetes mellitus with microalbuminuria, with long-term current use of  insulin (H); Type 2 diabetes mellitus with microalbuminuria, with  long-term current use of insulin (H)    Technique: Two views are submitted.    Comparison: 7/16/2021    Findings: The cardiac silhouette is within normal limits.. The  pulmonary vasculature is within normal limits.    The lungs are clear. Bony structures are  unremarkable.  Postoperative  changes of the mediastinum and a dual-lumen dialysis catheter are  again seen.      Impression    Impression:   No acute abnormality. No evidence of acute or active cardiopulmonary  disease.    STANTON BARCENAS MD         SYSTEM ID:  [id number]   Results for orders placed or performed in visit on 09/19/23   Hemoglobin A1c     Status: Abnormal   Result Value Ref Range    Hemoglobin A1C 8.0 (H) 4.0 - 6.2 %   Extra Tube     Status: None    Narrative    The following orders were created for panel order Extra Tube.  Procedure                               Abnormality         Status                     ---------                               -----------         ------                     Extra Red Top Tube[[id number]]                                                          Extra Serum Separator Tu...[[id number]]                      Final result                 Please view results for these tests on the individual orders.   Extra Serum Separator Tube (SST)     Status: None   Result Value Ref Range    Hold Specimen JIC          (R05.1) Acute cough  (primary encounter diagnosis)  Comment: Chest x-ray was unremarkable.  Plan: Hemoglobin A1c likely viral in origin.     A1c borderline       11.29,  R80.9,  Z79.4) Type 2 diabetes mellitus with microalbuminuria, with long-term current use of insulin (H)  Comment:   Plan: XR Chest 2 Views, atorvastatin (LIPITOR) 40 MG         tablet            (Z99.2) Dialysis patient (H)  Comment:   I have asked the patient to follow-up with his PCP.  Bring all his medications with him that he is currently taken.  Quite a bit of time was done and hopefully his medication list is as accurate as possible.  Medications should be reviewed at his next visit.  He should review his dialysis medications through nephrology.      Stanton Garza MD  [facility] HospitalAnswers submitted by the patient for this visit:  General Questionnaire (Submitted on  9/19/2023)  Chief Complaint: Chronic problems general questions HPI Form  How many servings of fruits and vegetables do you eat daily?: 0-1  On average, how many sweetened beverages do you drink each day (Examples: soda, juice, sweet tea, etc.  Do NOT count diet or artificially sweetened beverages)?: 1  How many minutes a day do you exercise enough to make your heart beat faster?: 9 or less  How many days a week do you exercise enough to make your heart beat faster?: 3 or less  How many days per week do you miss taking your medication?: 0  General Concern (Submitted on 9/19/2023)  Chief Complaint: Chronic problems general questions HPI Form  What is the reason for your visit today?: not feeling well  When did your symptoms begin?: 3-7 days ago  What are your symptoms?: not feeling well  How would you describe these symptoms?: Mild  Are your symptoms:: Staying the same  Have you had these symptoms before?: No

## 2023-09-21 NOTE — ED NOTES
[facility] ok for patient to leave [facility] ER at 0630 to be at [facility] by 0800 this morning.     [facility] does not have availability until 0900. Is calling other services to see if they can go sooner (0630). Will notify stat doc when transfer services are available

## 2023-09-21 NOTE — ED PROVIDER NOTES
Transfer of care from previous shift provider:. Accepted at [facility] for hyperkalemia in dialysis patient. Received insulin, calcium, fluids.       Assessment and Plan:  Final diagnoses:   Hyperkalemia   Abnormal liver function   ESRD (end stage renal disease) on dialysis (H)      No further issues during ED stay. Disposition: [facility].      Long Arreguin MD  09/21/23 [id number]

## 2023-09-21 NOTE — ED TRIAGE NOTES
Patient presents to ER with complaint of diarrhea, weakness and a wound on coccyx. Diabetic and dialysis patient.   /83   Pulse 84   Temp 98.4  F (36.9  C) (Tympanic)   Resp 25   SpO2 98%        Triage Assessment       Row Name 09/20/23 [id number]       Triage Assessment (Adult)    Airway WDL WDL       Respiratory WDL    Respiratory WDL WDL       Skin Circulation/Temperature WDL    Skin Circulation/Temperature WDL X  sore on bottom       Cardiac WDL    Cardiac WDL WDL       Peripheral/Neurovascular WDL    Peripheral Neurovascular WDL WDL       Cognitive/Neuro/Behavioral WDL    Cognitive/Neuro/Behavioral WDL WDL

## 2023-09-21 NOTE — ED PROVIDER NOTES
History     Chief Complaint   Patient presents with    Wound Check    Cough    Nausea, Vomiting, & Diarrhea     The history is provided by the patient and medical records.     Miguel Parisi is a 67 year old male who is here with right sided abdominal bruising, diarrhea and some blood on the toilet paper. All of this has been going on for about two days. He saw Dr Garza in clinic yesterday with nasal congestion and SOB. He did not mention these things to Dr Garza. He is on coumadin.    Allergies:  Allergies   Allergen Reactions    Metformin Diarrhea       Problem List:    Patient Active Problem List    Diagnosis Date Noted    Dialysis patient (H) 09/19/2023     Priority: Medium    Adjustment of prosthetic device 05/25/2023     Priority: Medium    Anticoagulation monitoring, INR range 2-3 07/01/2022     Priority: Medium    Hx of BKA, left (H) 04/05/2022     Priority: Medium    Coronary artery disease involving autologous artery coronary bypass graft with angina pectoris (H) 02/25/2022     Priority: Medium    Left facial numbness 01/11/2022     Priority: Medium    Peripheral edema 01/11/2022     Priority: Medium    Hypertriglyceridemia 01/11/2022     Priority: Medium    On Coumadin for atrial fibrillation (H) 01/11/2022     Priority: Medium    ESRD (end stage renal disease) on dialysis (H) 10/05/2021     Priority: Medium    H/O NSTEMI on 7/16/2021 08/17/2021     Priority: Medium    History of tobacco abuse 08/17/2021     Priority: Medium    Tobacco abuse counseling 08/17/2021     Priority: Medium    Paroxysmal A-fib (H) 08/17/2021     Priority: Medium    Hyperphosphatemia 07/27/2021     Priority: Medium    Acquired thrombocytopenia (H) 07/25/2021     Priority: Medium    Generalized ischemic myocardial dysfunction 07/25/2021     Priority: Medium    History of coronary artery bypass surgery 07/23/2021     Priority: Medium    H/O STEMI on 4/15/2021 06/17/2021     Priority: Medium    Morbid obesity (H) 06/17/2021      Priority: Medium    Chronic combined systolic (congestive) and diastolic (congestive) heart failure (H) 06/17/2021     Priority: Medium    Clinical diagnosis of COVID-19 on 11/9/2020 06/17/2021     Priority: Medium    Mild pulmonary hypertension (H) 06/17/2021     Priority: Medium    History of ITP on 4/16/2021 06/17/2021     Priority: Medium    Ulcer of right foot, limited to breakdown of skin (H) 06/17/2021     Priority: Medium    COPD-unknown severity.  Declined PFTs.  (H) 06/17/2021     Priority: Medium    Dehiscence of incision, subsequent encounter 06/17/2021     Priority: Medium    Hemiplegia and hemiparesis following cerebral infarction affecting left non-dominant side (H) 06/17/2021     Priority: Medium    Overweight 06/17/2021     Priority: Medium    Chronic systolic congestive heart failure (H) 06/17/2021     Priority: Medium    History of cerebrovascular accident on 11/16/2020 06/14/2021     Priority: Medium    Severe protein-calorie malnutrition (H) 05/24/2021     Priority: Medium     Formatting of this note might be different from the original.  5/24/21 - Encourage intake of meals, nutrition supplements    Formatting of this note might be different from the original.  Met ASPEN Criteria for severe malnutrition in acute context on 4/6/22      History of cardiac arrest 05/10/2021     Priority: Medium    Atherosclerosis of native coronary artery of native heart without angina pectoris 04/17/2021     Priority: Medium    History of sudden cardiac arrest successfully resuscitated 04/17/2021     Priority: Medium    Atherosclerotic cardiovascular disease 04/17/2021     Priority: Medium    Abnormality of gait as late effect of cerebrovascular accident (CVA) 12/18/2020     Priority: Medium    Ankle weakness 12/18/2020     Priority: Medium    Blood coagulation defect (H) 11/19/2020     Priority: Medium    Bilateral carotid artery stenosis 11/16/2020     Priority: Medium    PAD (peripheral artery disease) (H)  05/09/2019     Priority: Medium    Intermittent claudication (H) 05/09/2019     Priority: Medium    Type 2 diabetes mellitus with microalbuminuria, with long-term current use of insulin (H) 01/28/2019     Priority: Medium     Formatting of this note might be different from the original.  Per 2016 eye exam  Formatting of this note might be different from the original.  Microalbumin/creatinine ratio of 257 on 10/8/2018, per [facility] medical director review.  Formatting of this note might be different from the original.  Formatting of this note might be different from the original.  Per 2016 eye exam    Formatting of this note might be different from the original.  Microalbumin/creatinine ratio of 257 on 10/8/2018, per [facility] medical director review.      KEISHA with noncompliance 10/12/2015     Priority: Medium     Formatting of this note might be different from the original.  Does not wear cpap.  Formatting of this note might be different from the original.  Formatting of this note might be different from the original.  Does not wear cpap.      Depression with anxiety 11/25/2014     Priority: Medium     Formatting of this note might be different from the original.  Denies depression or anxiety 11/25/20      Osteoarthrosis 11/25/2014     Priority: Medium     Formatting of this note might be different from the original.  IMO Update      Mixed hyperlipidemia 11/21/2008     Priority: Medium     Formatting of this note might be different from the original.  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    Family History:    No family history on file.    Social History:  Marital Status:   [2]  Social History     Tobacco Use    Smoking status: Former     Packs/day: 1.00     Types: Cigarettes     Quit date: 1990     Years since quittin.7    Smokeless tobacco: Never   Vaping Use    Vaping Use: Never used   Substance Use Topics    Alcohol use: Never    Drug use: Never        Medications:    aspirin (ASA) 81 MG chewable tablet  atorvastatin (LIPITOR) 40 MG tablet  calcitRIOL (ROCALTROL) 1 MCG/ML solution  cholecalciferol 25 MCG (1000 UT) TABS  Heparin Sodium, Porcine, (HEPARIN, PORCINE,) 1000 UNIT/ML injection  insulin aspart (NOVOLOG FLEXPEN) 100 UNIT/ML pen  insulin aspart (NOVOLOG PEN) 100 UNIT/ML pen  metoprolol succinate ER (TOPROL XL) 25 MG 24 hr tablet  warfarin ANTICOAGULANT (COUMADIN) 2 MG tablet        Review of Systems   Gastrointestinal:  Positive for anal bleeding and diarrhea.   Skin:         Bruising on right abdominal wall       Physical Exam   BP: 133/83  Pulse: 84  Temp: 98.4  F (36.9  C)  Resp: 25  SpO2: 98 %      Physical Exam  Vitals and nursing note reviewed.   Constitutional:       General: He is not in acute distress.     Appearance: Normal appearance. He is not ill-appearing, toxic-appearing or diaphoretic.   Cardiovascular:      Rate and Rhythm: Normal rate and regular rhythm.      Pulses: Normal pulses.      Heart sounds: Normal heart sounds.   Pulmonary:      Effort: Pulmonary effort is normal. No respiratory distress.      Breath sounds: Normal breath sounds.   Abdominal:      General: Bowel sounds are normal.      Palpations: Abdomen is soft.      Tenderness: There is no abdominal tenderness.   Genitourinary:     Rectum: Normal.      Comments: No external hemorrhoids, no anal bleeding. Perianal skin is intact.  Skin:     General: Skin is warm and dry.   Neurological:      Mental Status: He is alert.     EKG: wide QRS, left axis, RBBB- stable.    Results for orders  placed or performed during the hospital encounter of 09/20/23 (from the past 24 hour(s))   CBC with platelets differential    Narrative    The following orders were created for panel order CBC with platelets differential.  Procedure                               Abnormality         Status                     ---------                               -----------         ------                     CBC with platelets and d...[[id number]]  Abnormal            Final result                 Please view results for these tests on the individual orders.   Comprehensive metabolic panel   Result Value Ref Range    Sodium 132 (L) 136 - 145 mmol/L    Potassium 5.5 (H) 3.4 - 5.3 mmol/L    Chloride 92 (L) 98 - 107 mmol/L    Carbon Dioxide (CO2) 15 (L) 22 - 29 mmol/L    Anion Gap 25 (H) 7 - 15 mmol/L    Urea Nitrogen 90.7 (H) 8.0 - 23.0 mg/dL    Creatinine 8.94 (H) 0.67 - 1.17 mg/dL    Calcium 8.7 (L) 8.8 - 10.2 mg/dL    Glucose 236 (H) 70 - 99 mg/dL    Alkaline Phosphatase 179 (H) 40 - 129 U/L     (H) 0 - 45 U/L     (H) 0 - 70 U/L    Protein Total 6.4 6.4 - 8.3 g/dL    Albumin 3.5 3.5 - 5.2 g/dL    Bilirubin Total 0.9 <=1.2 mg/dL    GFR Estimate 6 (L) >60 mL/min/1.73m2   INR   Result Value Ref Range    INR 5.98 (HH) 0.85 - 1.15   CBC with platelets and differential   Result Value Ref Range    WBC Count 15.5 (H) 4.0 - 11.0 10e3/uL    RBC Count 4.02 (L) 4.40 - 5.90 10e6/uL    Hemoglobin 13.6 13.3 - 17.7 g/dL    Hematocrit 39.6 (L) 40.0 - 53.0 %    MCV 99 78 - 100 fL    MCH 33.8 (H) 26.5 - 33.0 pg    MCHC 34.3 31.5 - 36.5 g/dL    RDW 17.0 (H) 10.0 - 15.0 %    Platelet Count 150 150 - 450 10e3/uL    % Neutrophils 80 %    % Lymphocytes 9 %    % Monocytes 9 %    % Eosinophils 0 %    % Basophils 1 %    % Immature Granulocytes 1 %    NRBCs per 100 WBC 0 <1 /100    Absolute Neutrophils 12.5 (H) 1.6 - 8.3 10e3/uL    Absolute Lymphocytes 1.4 0.8 - 5.3 10e3/uL    Absolute Monocytes 1.3 0.0 - 1.3 10e3/uL    Absolute Eosinophils 0.0  0.0 - 0.7 10e3/uL    Absolute Basophils 0.1 0.0 - 0.2 10e3/uL    Absolute Immature Granulocytes 0.1 <=0.4 10e3/uL    Absolute NRBCs 0.0 10e3/uL   Extra Tube *Canceled*    Narrative    The following orders were created for panel order Extra Tube.  Procedure                               Abnormality         Status                     ---------                               -----------         ------                     Extra Red Top Tube[[id number]]                                                          Extra Green Top (Lithium...[[id number]]                                                   Please view results for these tests on the individual orders.   Lactic acid whole blood   Result Value Ref Range    Lactic Acid 3.6 (H) 0.7 - 2.0 mmol/L   Urine Macroscopic with reflex to Microscopic   Result Value Ref Range    Color Urine Yellow Colorless, Straw, Light Yellow, Yellow    Appearance Urine Clear Clear    Glucose Urine 50 (A) Negative mg/dL    Bilirubin Urine Negative Negative    Ketones Urine Negative Negative mg/dL    Specific Gravity Urine 1.014 1.000 - 1.030    Blood Urine Trace (A) Negative    pH Urine 5.0 5.0 - 9.0    Protein Albumin Urine 20 (A) Negative mg/dL    Urobilinogen Urine Normal Normal, 2.0 mg/dL    Nitrite Urine Negative Negative    Leukocyte Esterase Urine Small (A) Negative    RBC Urine 13 (H) <=2 /HPF    WBC Urine 2 <=5 /HPF    Mucus Urine Present (A) None Seen /LPF    Hyaline Casts Urine 1 <=2 /LPF   CT Abdomen Pelvis w/o Contrast    Narrative    PROCEDURE INFORMATION:   Exam: CT Abdomen And Pelvis Without Contrast   Exam date and time: 9/21/2023 1:46 AM   Age: 67 years old   Clinical indication: Abdominal tenderness and other: Bruising; Additional info:   Low abdominal bruising, reported blood in stool     TECHNIQUE:   Imaging protocol: Computed tomography of the abdomen and pelvis without   contrast.   Radiation optimization: All CT scans at this facility use at least one of these   dose  optimization techniques: automated exposure control; mA and/or kV   adjustment per patient size (includes targeted exams where dose is matched to   clinical indication); or iterative reconstruction.     REPORTING DATA:   Count of CT and Cardiac NM exams in prior 12 months: This patient has received   0 known CTs and 0 known cardiac nuclear medicine studies in the 12 months prior   to the current study.     COMPARISON:   CR XR CHEST 2 VIEWS 9/19/2023 11:58 AM     FINDINGS:   Liver: Normal. No mass.   Gallbladder and bile ducts: Cholecystectomy. Nondilated biliary system.   Pancreas: Atrophic pancreas. Negative for focal lesion.   Spleen: Normal. No splenomegaly.   Adrenal glands: Normal. No mass.   Kidneys and ureters: Atrophic renal parenchyma. Negative for hydronephrosis.   Stomach and bowel: Unremarkable. No obstruction. No mucosal thickening.   Appendix: No evidence of appendicitis.     Intraperitoneal space: Negative for retroperitoneal hematoma. Small volume   scattered ascites. No free air.   Vasculature: Diffuse calcified arterial wall plaques. Negative for abdominal   aorta aneurysm.   Lymph nodes: Unremarkable. No enlarged lymph nodes.   Urinary bladder: Decompressed bladder.   Reproductive: Unremarkable as visualized.   Bones/joints: Unremarkable. No acute fracture.   Soft tissues: Superficial edema changes of the left lower quadrant ventral   abdominal wall. No focal organized collection. Small umbilical hernia.       Impression    IMPRESSION:   1.   Negative for acute intra-abdominal pathology.   2.   Nonspecific hazy superficial soft tissue edema changes of the left lower   quadrant ventral abdominal wall.   3.   Mild intraperitoneal 3rd spacing of fluid. Simple attenuation values of   the fluid.     THIS DOCUMENT HAS BEEN ELECTRONICALLY SIGNED BY GIULIA BOSS MD   Glucose by meter   Result Value Ref Range    GLUCOSE BY METER POCT 143 (H) 70 - 99 mg/dL   Lactic acid whole blood   Result Value Ref Range     Lactic Acid 2.8 (H) 0.7 - 2.0 mmol/L   Glucose by meter   Result Value Ref Range    GLUCOSE BY METER POCT 115 (H) 70 - 99 mg/dL       Medications   calcium gluconate 10 % injection 1 g (0 g Intravenous Stopped 9/21/23 0140)   insulin (regular) (HumuLIN R/NovoLIN R) injection 10 Units (10 Units Intravenous $Given 9/21/23 0143)   sodium chloride 0.9% BOLUS 1,000 mL (0 mLs Intravenous Stopped 9/21/23 0233)       Assessments & Plan (with Medical Decision Making)  Miguel Parisi is a 67 year old male who is here with right sided abdominal bruising, diarrhea and some blood on the toilet paper. All of this has been going on for about two days. He saw Dr Garza in clinic yesterday with nasal congestion and SOB. He did not mention these things to Dr Garza. He is on coumadin. VS in the ED /83   Pulse 84   Temp 98.4  F (36.9  C) (Tympanic)   Resp 25   SpO2 98%   Exam shows no focal findings. EKG stable.  Labs show CBC with WBC 15,500, CMP with Na 132, K 5.5, Cl 92, anion gap 25, BUN 91, Cr 8.9, alk phos 179, , , INR 5.98, lactic acid 3.6 and then 2.8, UA does not imply UTI.  We started calcium gluconate, IVF and insulin.  I think he is not a very good historian and with his pattern of abdominal bruising, report of blood in the stool, and abnormal CMP I think he may have fallen and injured his abdomen.  CT abdomen and pelvis without contrast negative for acute intra-abdominal pathology.    He needs to be in a hospital and will need to be somewhere with inpatient dialysis.  I spoke with Dr Sandra and [facility] will take him at 0800.   4:27 AM  Repeat lactic acid is 3.2  5:35 AM  CMP with Na 135, K 4.6, Cl 93, CO2 16, BUN 88, Cr 9, alk phos 171, , .    7:00 AM  Another lactic acid is pending. I am signing out his care to Dr Arreguin. This patient is ready for transfer.     I have reviewed the nursing notes.    I have reviewed the findings, diagnosis, plan and need for follow up with the  patient.     Medical Decision Making  The patient's presentation was of moderate complexity (an undiagnosed new problem with uncertain diagnosis).    The patient's evaluation involved:  an assessment requiring an independent historian (see separate area of note for details)  ordering and/or review of 3+ test(s) in this encounter (see separate area of note for details)  discussion of management or test interpretation with another health professional (Dr Sandra at [facility])    The patient's management necessitated high risk (a decision regarding hospitalization) and further care after sign-out to Dr Sandra at [facility] (see their note for further management).    Final diagnoses:   Hyperkalemia   Abnormal liver function   ESRD (end stage renal disease) on dialysis (H)       9/20/2023   [facility]  09/21/23 0682

## 2023-09-21 NOTE — ED NOTES
Patient placement at [facility] notified of delay of transfer due to no ambulance availability.

## 2023-09-23 NOTE — TELEPHONE ENCOUNTER
Pt called PT requesting physical therapy to resume as pt has been experiencing increased leg pain and mobility deficits recently. Pt also was hospitalized this week. Previous PT orders included PAD and history of BKA, left. Please send over new orders for PT.

## 2023-09-25 PROBLEM — N18.6 ESRD (END STAGE RENAL DISEASE) ON DIALYSIS (H): Status: ACTIVE | Noted: 2021-10-05

## 2023-09-25 PROBLEM — E87.6 HYPOKALEMIA: Status: ACTIVE | Noted: 2022-01-21

## 2023-09-25 PROBLEM — N17.0 ACUTE KIDNEY FAILURE WITH TUBULAR NECROSIS (H): Status: ACTIVE | Noted: 2021-08-17

## 2023-09-25 PROBLEM — R29.898 ANKLE WEAKNESS: Status: RESOLVED | Noted: 2020-12-18 | Resolved: 2023-01-01

## 2023-09-25 PROBLEM — Z99.2 ESRD (END STAGE RENAL DISEASE) ON DIALYSIS (H): Status: ACTIVE | Noted: 2021-10-05

## 2023-09-25 PROBLEM — D63.1 ANEMIA IN CHRONIC KIDNEY DISEASE: Status: ACTIVE | Noted: 2021-08-23

## 2023-09-25 PROBLEM — E87.5 HYPERKALEMIA: Status: ACTIVE | Noted: 2021-08-19

## 2023-09-25 PROBLEM — Z89.512 ACQUIRED ABSENCE OF LEFT LEG BELOW KNEE (H): Status: ACTIVE | Noted: 2022-04-22

## 2023-09-25 PROBLEM — S98.311A: Status: ACTIVE | Noted: 2022-06-07

## 2023-09-25 PROBLEM — N18.9 ANEMIA IN CHRONIC KIDNEY DISEASE: Status: ACTIVE | Noted: 2021-08-23

## 2023-09-25 PROBLEM — F43.23 ADJUSTMENT DISORDER WITH MIXED ANXIETY AND DEPRESSED MOOD: Status: ACTIVE | Noted: 2021-05-26

## 2023-09-25 NOTE — PHARMACY
Pharmacy- Renal Dose Adjustment    Patient Active Problem List   Diagnosis    History of cardiac arrest    Atherosclerosis of native coronary artery of native heart without angina pectoris    H/O STEMI on 4/15/2021    Primary hypertension    Morbid obesity (H)    Type 2 diabetes mellitus with microalbuminuria, with long-term current use of insulin (H)    Mixed hyperlipidemia    History of cerebrovascular accident on 11/16/2020    Chronic combined systolic (congestive) and diastolic (congestive) heart failure (H)    Clinical diagnosis of COVID-19 on 11/9/2020    KEISHA with noncompliance    Mild pulmonary hypertension (H)    PAD (peripheral artery disease) (H)    Bilateral carotid artery stenosis    History of ITP on 4/16/2021    Ulcer of right foot, limited to breakdown of skin (H)    COPD-unknown severity.  Declined PFTs.  (H)    History of coronary artery bypass surgery    H/O NSTEMI on 7/16/2021    History of tobacco abuse    Tobacco abuse counseling    Paroxysmal A-fib (H)    Abnormality of gait as late effect of cerebrovascular accident (CVA)    Acquired thrombocytopenia (H)    Blood coagulation defect (H)    Dehiscence of incision, subsequent encounter    Depression with anxiety    Generalized ischemic myocardial dysfunction    Hemiplegia and hemiparesis following cerebral infarction affecting left non-dominant side (H)    History of sudden cardiac arrest successfully resuscitated    Hyperphosphatemia    Intermittent claudication (H)    Osteoarthrosis    Overweight    Severe protein-calorie malnutrition (H)    Atherosclerotic cardiovascular disease    ESRD (end stage renal disease) on dialysis (H)    Chronic systolic congestive heart failure (H)    Left facial numbness    Peripheral edema    Hypertriglyceridemia    On Coumadin for atrial fibrillation (H)    Coronary artery disease involving autologous artery coronary bypass graft with angina pectoris (H)    Hx of BKA, left (H)    Anticoagulation monitoring, INR  range 2-3    Adjustment of prosthetic device    Dialysis patient (H)    Acquired absence of left leg below knee (H)    Acute kidney failure with tubular necrosis (H)    Adjustment disorder with mixed anxiety and depressed mood    Anemia in chronic kidney disease    Complete traumatic amputation of right midfoot, initial encounter (H)    Hyperkalemia    Hypokalemia        Relevant Labs:  Recent Labs   Lab Test 09/25/23  0754 09/21/23  0042   WBC 12.1* 15.5*   HGB 13.7 13.6   * 150        CrCl: CMP pending today, but Scr = 9.00 only 4 days ago (pt is a dialysis patient)    No intake or output data in the 24 hours ending 09/25/23 0818       Per Renal Dose Adjustment Protocol, will adjust:  -Pip/tazo to 2.25 g x1 (due to patient being on intermittent hemodialysis)      Will continue to follow and make adjustments accordingly. Thank You.    Roni Martinez [facility] ....................  9/25/2023   8:18 AM

## 2023-09-25 NOTE — PHARMACY-VANCOMYCIN DOSING SERVICE
"Pharmacy Vancomycin Initial Note  Date of Service 2023  Patient's  1955  67 year old, male    Indication: Sepsis    Current estimated CrCl = CrCl cannot be calculated (Unknown ideal weight.).    Creatinine for last 3 days  No results found for requested labs within last 3 days.    Recent Vancomycin Level(s) for last 3 days  No results found for requested labs within last 3 days.      Vancomycin IV Administrations (past 72 hours)        No vancomycin orders with administrations in past 72 hours.                    Nephrotoxins and other renal medications (From now, onward)      Start     Dose/Rate Route Frequency Ordered Stop    23 0900  vancomycin (VANCOCIN) 2,000 mg in sodium chloride 0.9 % 500 mL intermittent infusion         2,000 mg  over 2 Hours Intravenous ONCE 23 0822      23 0820  piperacillin-tazobactam (ZOSYN) 2.25 g vial to attach to  ml bag        Note to Pharmacy: For [facility], [facility] and [facility]: For Zosyn-naive patients, use the \"Zosyn initial dose + extended infusion\" order panel.    2.25 g  over 30 Minutes Intravenous ONCE 23 0818              Contrast Orders - past 72 hours (72h ago, onward)      None                Plan:  Give vancomycin  2,000 mg IV x1 (ED loading dose of 20 mg/kg)  Vancomycin monitoring method: AUC  Vancomycin therapeutic monitoring goal: 400-600 mg*h/L  Pharmacy will check vancomycin levels as appropriate in 1-3 Days.    Serum creatinine levels will be ordered daily for the first week of therapy and at least twice weekly for subsequent weeks.      Roni Martinez, [facility]    "

## 2023-09-25 NOTE — ED TRIAGE NOTES
Patient comes from home with spouse via [facility] ambulance with complaints of urinary retention and not sleeping well.  He is a dialysis patient and is due for treatment tomorrow.       Triage Assessment       Row Name 09/25/23 0728       Triage Assessment (Adult)    Airway WDL WDL       Respiratory WDL    Respiratory WDL WDL       Cardiac WDL    Cardiac WDL WDL

## 2023-09-25 NOTE — ED PROVIDER NOTES
History     Chief Complaint   Patient presents with    Fatigue    Urinary Retention     HPI  Miguel Parisi is a 67 year old male who presents for generalized fatigue and shortness of breath.  Of significance he has end-stage renal disease he is on dialysis Tuesday Thursday and Saturday.  He was actually in the hospital over the weekend at [facility].  This was in part due to need for acute dialysis for hyperkalemia.  Additionally his INR was noted to be elevated.    Today his biggest complaint is shortness of breath.  He also complains of bruising all over his body.  He held his medications as directed from [facility].  He was due to resume them today.  He has not had a repeat INR since discharge.    Reviewed his recent [facility] station and discharge.  Please see those records for further details.    Denies chest pain.  No increase in leg swelling.  No fevers.  He was brought in by his wife.  He went to dialysis on Saturday.  Did not take his medications since Saturday because he was told not to take them until Monday, I think he is referring to his Coumadin for which she was recommended to hold because of an elevated INR.    Tells me the infection on his leg on the right has been there and looks better than it ever has and is not seeming to progress or be worse.    Labs significant for a lactic acidosis with a metabolic acidosis.  I have concern for sepsis given his elevated white blood cell count, elevated lactate and tachycardia.  He has not been febrile for me.  I have not been able to identify source of infection.  Blood cultures have been drawn and sent.  This was done before IV antibiotics were given.  He was given Zosyn to start, renal dosing.  He does not make much urine so I did not check for urinalysis.  Chest x-ray showed pulmonary vascular congestion but no new acute infiltrate.  Other than the wound on his right lower leg which she thinks is stable I have no other source of infection  identified.    Allergies:  Allergies   Allergen Reactions    Metformin Diarrhea       Problem List:    Patient Active Problem List    Diagnosis Date Noted    Dialysis patient (H) 09/19/2023     Priority: Medium    Adjustment of prosthetic device 05/25/2023     Priority: Medium    Anticoagulation monitoring, INR range 2-3 07/01/2022     Priority: Medium    Complete traumatic amputation of right midfoot, initial encounter (H) 06/07/2022     Priority: Medium    Acquired absence of left leg below knee (H) 04/22/2022     Priority: Medium    Hx of BKA, left (H) 04/05/2022     Priority: Medium    Coronary artery disease involving autologous artery coronary bypass graft with angina pectoris (H) 02/25/2022     Priority: Medium    Hypokalemia 01/21/2022     Priority: Medium    Left facial numbness 01/11/2022     Priority: Medium    Peripheral edema 01/11/2022     Priority: Medium    Hypertriglyceridemia 01/11/2022     Priority: Medium    On Coumadin for atrial fibrillation (H) 01/11/2022     Priority: Medium    ESRD (end stage renal disease) on dialysis (H) 10/05/2021     Priority: Medium    Anemia in chronic kidney disease 08/23/2021     Priority: Medium    Hyperkalemia 08/19/2021     Priority: Medium    H/O NSTEMI on 7/16/2021 08/17/2021     Priority: Medium    History of tobacco abuse 08/17/2021     Priority: Medium    Tobacco abuse counseling 08/17/2021     Priority: Medium    Paroxysmal A-fib (H) 08/17/2021     Priority: Medium    Acute kidney failure with tubular necrosis (H) 08/17/2021     Priority: Medium    Hyperphosphatemia 07/27/2021     Priority: Medium    Acquired thrombocytopenia (H) 07/25/2021     Priority: Medium    Generalized ischemic myocardial dysfunction 07/25/2021     Priority: Medium    History of coronary artery bypass surgery 07/23/2021     Priority: Medium    H/O STEMI on 4/15/2021 06/17/2021     Priority: Medium    Morbid obesity (H) 06/17/2021     Priority: Medium    Chronic combined systolic  (congestive) and diastolic (congestive) heart failure (H) 06/17/2021     Priority: Medium    Clinical diagnosis of COVID-19 on 11/9/2020 06/17/2021     Priority: Medium    Mild pulmonary hypertension (H) 06/17/2021     Priority: Medium    History of ITP on 4/16/2021 06/17/2021     Priority: Medium    Ulcer of right foot, limited to breakdown of skin (H) 06/17/2021     Priority: Medium    COPD-unknown severity.  Declined PFTs.  (H) 06/17/2021     Priority: Medium    Dehiscence of incision, subsequent encounter 06/17/2021     Priority: Medium    Hemiplegia and hemiparesis following cerebral infarction affecting left non-dominant side (H) 06/17/2021     Priority: Medium    Overweight 06/17/2021     Priority: Medium    Chronic systolic congestive heart failure (H) 06/17/2021     Priority: Medium    History of cerebrovascular accident on 11/16/2020 06/14/2021     Priority: Medium    Adjustment disorder with mixed anxiety and depressed mood 05/26/2021     Priority: Medium    Severe protein-calorie malnutrition (H) 05/24/2021     Priority: Medium     Formatting of this note might be different from the original.  5/24/21 - Encourage intake of meals, nutrition supplements    Formatting of this note might be different from the original.  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  Per 2016 eye exam  Formatting of this note might be different from the original.  Microalbumin/creatinine ratio of 257 on 10/8/2018, per [facility] medical director review.  Formatting of this note might be different from the original.  Formatting of this note might be different from the original.  Per 2016 eye exam    Formatting of this note might be different from the original.  Microalbumin/creatinine ratio of 257 on 10/8/2018, per [facility] medical director review.      KEISHA with noncompliance 10/12/2015     Priority: Medium     Formatting of this note might be different from the original.  Does not wear cpap.  Formatting of this note might be different from the original.  Formatting of this note might be different from the original.  Does not wear cpap.      Depression with anxiety 11/25/2014     Priority: Medium     Formatting of this note might be different from the original.  Denies depression or anxiety 11/25/20      Osteoarthrosis 11/25/2014     Priority: Medium     Formatting of this note might be different from the original.  IMO Update      Mixed hyperlipidemia 11/21/2008     Priority: Medium     Formatting of this note might be different from the original.  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    Family History:    No family history on file.    Social History:  Marital Status:   [2]  Social History     Tobacco Use    Smoking status: Former     Packs/day: 1.00     Types: Cigarettes     Quit date: 1990     Years since quittin.7    Smokeless tobacco: Never   Vaping Use    Vaping Use: Never used   Substance Use Topics    Alcohol use: Never    Drug use: Never        Medications:    aspirin (ASA) 81 MG chewable tablet  atorvastatin (LIPITOR) 40 MG tablet  calcitRIOL (ROCALTROL) 1 MCG/ML solution  cholecalciferol 25 MCG (1000 UT) TABS  Heparin Sodium, Porcine, (HEPARIN, PORCINE,) 1000 UNIT/ML injection  insulin aspart (NOVOLOG FLEXPEN) 100 UNIT/ML pen  insulin aspart (NOVOLOG PEN) 100 UNIT/ML pen  metoprolol succinate ER (TOPROL XL) 25 MG 24 hr tablet  warfarin ANTICOAGULANT (COUMADIN) 2 MG tablet          Review of Systems   Constitutional:  Positive for fatigue.   Gastrointestinal:  Positive for vomiting. Negative for constipation, diarrhea and nausea.   Genitourinary:  Negative for dysuria.   Skin:  Positive for wound.   Neurological:  Positive for weakness. Negative for dizziness, tremors and syncope.       Physical Exam   BP: 108/82  Pulse: 101  Temp: 98.6  F (37  C)  Resp: 20  SpO2: 97 %      Physical Exam  Constitutional:       General: He is not in acute distress.     Appearance: Normal appearance. He is obese. He is not toxic-appearing.      Comments: Peers acutely on chronically ill weak deconditioned and frail   HENT:      Head: Atraumatic.      Mouth/Throat:      Mouth: Mucous membranes are moist.   Eyes:      General: No scleral icterus.     Conjunctiva/sclera: Conjunctivae normal.   Cardiovascular:      Rate and Rhythm: Tachycardia present.      Heart sounds: Murmur heard.   Pulmonary:      Effort: Pulmonary effort is normal. No respiratory distress.      Breath sounds: Normal breath sounds.   Abdominal:      Palpations: Abdomen is soft.      Tenderness: There is no abdominal  tenderness.   Musculoskeletal:         General: No deformity.      Cervical back: Neck supple.      Comments: He is status post amputation of the right foot.  There is open wounds and drainage on the medial aspect of the right lower leg and the anterior right lower leg.  He is status post transmetatarsal amputation on the right.  There is a open sore with drainage on the right distal foot.  He is status post BKA on the left.  Please see media for photos.   Skin:     General: Skin is warm.      Comments: See media for details.  He has an anterior prior area of injury with small amount of drainage.  There is a small open wound on the distal aspect of the foot where the amputations have been on the right.  There is some redness around here.  Left below the knee amputation.   Neurological:      General: No focal deficit present.      Mental Status: He is alert.         ED Course              ED Course as of 09/25/23 1032   Mon Sep 25, 2023   0737 End-stage renal disease patient normally on dialysis Tuesday Thursday Friday.  Was recently admitted to [facility] over the weekend for hyperkalemia.  Had dialysis at that time.  INR had been elevated.   0803 Lactate 2.9   0805 Sepsis trigger.  Lactate elevated.  White blood cell count elevated.  Tachycardic.  Labs ordered.  Will not give a 30 cc/kg bolus given his history of end-stage renal disease on dialysis.  We will start with 1 L bolus and monitor start antibiotics   0808 INR improving   0818 EKG unchange from prior   0914 Called to [facility].  Awaiting discussion to see if he needs transfer to higher level of care at this time.     Procedures              EKG Interpretation:      Interpreted by Hannah Rojo, DO  Unchanged from prior  RBBB  Widened QRS.   No acute ST T changes  No change from prior            Critical Care time:  none     The Lactic acid level is elevated due to possible sepsis, at this time there is no sign of severe sepsis or septic shock.          Results for orders placed or performed during the hospital encounter of 09/25/23 (from the past 24 hour(s))   CBC with platelets differential    Narrative    The following orders were created for panel order CBC with platelets differential.  Procedure                               Abnormality         Status                     ---------                               -----------         ------                     CBC with platelets and d...[[id number]]  Abnormal            Final result                 Please view results for these tests on the individual orders.   Basic metabolic panel   Result Value Ref Range    Sodium 134 (L) 136 - 145 mmol/L    Potassium 4.4 3.4 - 5.3 mmol/L    Chloride 93 (L) 98 - 107 mmol/L    Carbon Dioxide (CO2) 17 (L) 22 - 29 mmol/L    Anion Gap 24 (H) 7 - 15 mmol/L    Urea Nitrogen 56.7 (H) 8.0 - 23.0 mg/dL    Creatinine 6.83 (H) 0.67 - 1.17 mg/dL    Calcium 8.6 (L) 8.8 - 10.2 mg/dL    Glucose 245 (H) 70 - 99 mg/dL    GFR Estimate 8 (L) >60 mL/min/1.73m2   Lactic Acid STAT   Result Value Ref Range    Lactic Acid 2.9 (H) 0.7 - 2.0 mmol/L   INR   Result Value Ref Range    INR 2.33 (H) 0.85 - 1.15   Extra Tube    Narrative    The following orders were created for panel order Extra Tube.  Procedure                               Abnormality         Status                     ---------                               -----------         ------                     Extra Blood Culture Bottle[[id number]]                       Final result               Extra Red Top Tube[[id number]]                               Final result                 Please view results for these tests on the individual orders.   Extra Blood Culture Bottle   Result Value Ref Range    Hold Specimen JIC    Extra Red Top Tube   Result Value Ref Range    Hold Specimen JIC    Extra Tube    Narrative    The following orders were created for panel order Extra Tube.  Procedure                               Abnormality         Status                      ---------                               -----------         ------                     Extra Blood Culture Bottle[[id number]]                       Final result                 Please view results for these tests on the individual orders.   Extra Blood Culture Bottle   Result Value Ref Range    Hold Specimen JIC    CBC with platelets and differential   Result Value Ref Range    WBC Count 12.1 (H) 4.0 - 11.0 10e3/uL    RBC Count 4.07 (L) 4.40 - 5.90 10e6/uL    Hemoglobin 13.7 13.3 - 17.7 g/dL    Hematocrit 41.8 40.0 - 53.0 %     (H) 78 - 100 fL    MCH 33.7 (H) 26.5 - 33.0 pg    MCHC 32.8 31.5 - 36.5 g/dL    RDW 17.4 (H) 10.0 - 15.0 %    Platelet Count 136 (L) 150 - 450 10e3/uL    % Neutrophils 82 %    % Lymphocytes 8 %    % Monocytes 7 %    % Eosinophils 1 %    % Basophils 1 %    % Immature Granulocytes 1 %    NRBCs per 100 WBC 0 <1 /100    Absolute Neutrophils 10.0 (H) 1.6 - 8.3 10e3/uL    Absolute Lymphocytes 1.0 0.8 - 5.3 10e3/uL    Absolute Monocytes 0.8 0.0 - 1.3 10e3/uL    Absolute Eosinophils 0.1 0.0 - 0.7 10e3/uL    Absolute Basophils 0.1 0.0 - 0.2 10e3/uL    Absolute Immature Granulocytes 0.1 <=0.4 10e3/uL    Absolute NRBCs 0.0 10e3/uL   Comprehensive metabolic panel   Result Value Ref Range    Sodium 134 (L) 136 - 145 mmol/L    Potassium 4.4 3.4 - 5.3 mmol/L    Chloride 93 (L) 98 - 107 mmol/L    Carbon Dioxide (CO2) 17 (L) 22 - 29 mmol/L    Anion Gap 24 (H) 7 - 15 mmol/L    Urea Nitrogen 56.7 (H) 8.0 - 23.0 mg/dL    Creatinine 6.83 (H) 0.67 - 1.17 mg/dL    Calcium 8.6 (L) 8.8 - 10.2 mg/dL    Glucose 245 (H) 70 - 99 mg/dL    Alkaline Phosphatase 124 40 - 129 U/L    AST 55 (H) 0 - 45 U/L     (H) 0 - 70 U/L    Protein Total 6.7 6.4 - 8.3 g/dL    Albumin 3.9 3.5 - 5.2 g/dL    Bilirubin Total 1.5 (H) <=1.2 mg/dL    GFR Estimate 8 (L) >60 mL/min/1.73m2   Procalcitonin   Result Value Ref Range    Procalcitonin 0.35 (H) <0.05 ng/mL   Magnesium   Result Value Ref Range    Magnesium 2.0  1.7 - 2.3 mg/dL   Blood gas venous and oxyhgb   Result Value Ref Range    pH Venous 7.28 (L) 7.32 - 7.43    pCO2 Venous 42 40 - 50 mm Hg    pO2 Venous 37 25 - 47 mm Hg    Bicarbonate Venous 19 (L) 21 - 28 mmol/L    FIO2 21     Oxyhemoglobin Venous 55 (L) 70 - 75 %    Base Excess/Deficit -7.1 -7.7 - 1.9 mmol/L   Symptomatic Influenza A/B, RSV, & SARS-CoV2 PCR (COVID-19) Nose    Specimen: Nose; Swab   Result Value Ref Range    Influenza A PCR Negative Negative    Influenza B PCR Negative Negative    RSV PCR Negative Negative    SARS CoV2 PCR Negative Negative    Narrative    Testing was performed using the Xpert Xpress CoV2/Flu/RSV Assay on the [employer]. This test should be ordered for the detection of SARS-CoV-2, influenza, and RSV viruses in individuals who meet clinical and/or epidemiological criteria. Test performance is unknown in asymptomatic patients. This test is for in vitro diagnostic use under the FDA EUA for laboratories certified under CLIA to perform high or moderate complexity testing. This test has not been FDA cleared or approved. A negative result does not rule out the presence of PCR inhibitors in the specimen or target RNA in concentration below the limit of detection for the assay. If only one viral target is positive but coinfection with multiple targets is suspected, the sample should be re-tested with another FDA cleared, approved, or authorized test, if coinfection would change clinical management. This test was validated by the [facility] [facility]. These laboratories are certified under the Clinical Laboratory Improvement Amendments of 1988 (CLIA-88) as qualified to perform high complexity laboratory testing.   XR Chest Port 1 View    Narrative    PROCEDURE:  XR CHEST PORT 1 VIEW    HISTORY: short of breath, dialysis patient on t,t,s    COMPARISON:  Radiographs 9/19/2023    FINDINGS: Single view chest radiograph  Postsurgical changes of the chest. Left  intrajugular central venous  catheter with tip projecting over the superior right atrium.  Cardiomediastinal silhouette is enlarged, stable.  A few peripheral interstitial opacities. Pulmonary vasculature is  mildly indistinct. No effusion or pneumothorax.    No suspicious osseous lesion or subdiaphragmatic free air.      Impression    IMPRESSION:   Stable to slightly increased findings of mild pulmonary vascular  congestion.    VERONIKA AMBROSE MD         SYSTEM ID:  [id number]   US Abdomen Limited    Narrative    EXAMINATION: Limited Abdominal Ultrasound, 9/25/2023 10:04 AM     COMPARISON: CT abdomen pelvis 9/21/2023    HISTORY: elevated LFTs and bili    TECHNIQUE: Focused right upper quadrant multiplanar scan of the  abdomen using both gray-scale, color Doppler ultrasound techniques.    FINDINGS:   Fluid: No evidence of ascites or pleural effusions.    Liver: The liver is normal in size very demonstrates mildly coarsened  echotexture. There is no focal mass. The main portal vein is  nondilated and demonstrates appropriate flow towards the liver.    Gallbladder: Surgically absent.    Bile Ducts: Both the intra- and extrahepatic biliary system are of  normal caliber.  The common bile duct measures 5 mm in diameter.    Pancreas: Visualized portions of the head and body of the pancreas are  unremarkable.     Right kidney: The right kidney measures 9.9 cm long. There is no  hydronephrosis or hydroureter, no shadowing renal calculi, cystic  lesion or mass. Renal cortical thinning, suggestive of underlying  medical renal disease.     Vessels: The visualized IVC and aorta are within normal limits      Impression    IMPRESSION:   No acute right upper quadrant abnormality, no biliary dilatation.  Mildly coarsened hepatic echotexture, nonspecific, likely represents  underlying hepatic steatosis.    VERONIKA AMBROSE MD         SYSTEM ID:  [id number]       Medications   sodium chloride (PF) 0.9% PF flush 3 mL (has no administration in  time range)   sodium chloride (PF) 0.9% PF flush 3 mL (0 mLs Intracatheter Hold 9/25/23 0819)   sodium chloride 0.9% BOLUS 1,000 mL (1,000 mLs Intravenous $New Bag 9/25/23 1018)   vancomycin (VANCOCIN) 2,000 mg in sodium chloride 0.9 % 500 mL intermittent infusion (2,000 mg Intravenous $New Bag 9/25/23 0958)   sodium chloride 0.9% BOLUS 1,000 mL (1,000 mLs Intravenous $New Bag 9/25/23 0816)   piperacillin-tazobactam (ZOSYN) 2.25 g vial to attach to  ml bag (2.25 g Intravenous $New Bag 9/25/23 0842)       Assessments & Plan (with Medical Decision Making)     I have reviewed the nursing notes.    I have reviewed the findings, diagnosis, plan and need for follow up with the patient.          Medical Decision Making  The patient's presentation was of moderate complexity (an undiagnosed new problem with uncertain diagnosis).    The patient's evaluation involved:  ordering and/or review of 3+ test(s) in this encounter (see separate area of note for details)    The patient's management necessitated high risk (a decision regarding hospitalization).        New Prescriptions    No medications on file       Final diagnoses:   Lactic acidosis   SOB (shortness of breath)   Source of infection unclear other than possible right lower leg which she tells me has been chronic.  Could consider CT or MRI at higher level of care facility depending on findings.  Blood cultures are pending.  Creatinine is elevated but per his usual baseline given his chronic kidney disease.  His dialysis is usually Tuesday Thursday and Saturday.  His last dialysis was on Saturday.  He complains of shortness of breath chest x-ray imaging showed infiltrates.  However, given his elevated lactate and meeting criteria for SIRS versus sepsis he was giving 1000 mL bolus of normal saline over 1 hour.  He may end up volume overloaded.  This could be better managed at a higher level of care where dialysis is possible.  He was given the renal dose of Zosyn.   No other antibiotics were given.  He does not make urine so urine was not sent for work-up.    Patient agreed to transfer to higher level of care.        Sepsis Evaluation     I was called to see Miguel Parisi due to abnormal vital signs triggering the Sepsis SIRS screening alert. He is not known to have an infection.     PHYSICAL EXAM  Vital Signs:  Temp: 98.6  F (37  C)   BP: 108/82 Pulse: 101   Resp: 20 SpO2: 97 %        General: chronically ill appearing  Mental Status: baseline mental status.     Remainder of physical exam is significant for rales, leg edema. Wound of R leg    DATA  Lactic Acid   Date Value Ref Range Status   09/25/2023 2.9 (H) 0.7 - 2.0 mmol/L Final   09/21/2023 2.4 (H) 0.7 - 2.0 mmol/L Final       ASSESSMENT AND PLAN  Miguel Parisi meets SIRS criteria but does NOT have a lactate >2 or other evidence of acute organ damage.  These vital sign, lab and physical exam findings constitute a diagnosis of SEPSIS.         Anti-infectives (From now, onward)      Start     Dose/Rate Route Frequency Ordered Stop    09/25/23 0900  vancomycin (VANCOCIN) 2,000 mg in sodium chloride 0.9 % 500 mL intermittent infusion         2,000 mg  over 2 Hours Intravenous ONCE 09/25/23 0822            Current antibiotic coverage is appropriate for source of infection.     Disposition: The patient  transfer to [facility] .  Hannah Rojo, DO  09/25/23, 8:06 AM    Sepsis Criteria   Sepsis: The body's generalized inflammatory state as a response to an infection. Sepsis Predictive Model includes >80 variable to alert to potential sepsis.  Severe Sepsis: Sepsis plus one or more variables of acute organ dysfunction (Note: lactic acid >2 or acute encephalopathy each qualify as organ dysfunction)  Septic Shock: Sepsis AND hypotension despite adequate volume resuscitation with crystalloid or lactic acid >=4  Note: HYPOTENSION is defined as 2 BP readings measured 3 hrs apart that have a SBP <90, MAP <65, or decrease >40  mmHg, occurring 6 hrs before or after t-zero      9/25/2023   [facility]       Hannah Rojo DO  09/25/23 1033

## 2023-09-28 NOTE — PROGRESS NOTES
Patient Quality Outreach    Patient is due for the following:   Hypertension -  BP check    Next Steps:   No follow up needed at this time.    Type of outreach:    Chart review performed, no outreach needed. Patient was seen in the ER on 9/25/2023 with a BP of 126/85. Flowsheets updated.    Questions for provider review:    None           Sharon Ricks RN

## 2024-12-31 NOTE — PROGRESS NOTES
----- Message from Carlos Manuel sent at 12/31/2024 10:32 AM CST -----  Contact: Pt  .Type:  Needs Medical Advice    Who Called: pt    Pharmacy name and phone #:  [id number] [address] [place] [place] [facility]   Phone: [phone]  Fax: [phone]  [facility] HEART CARE   CARDIOLOGY PROGRESS NOTE     Chief Complaint   Patient presents with     Follow Up     3 mo. F/U          Diagnosis:  1.  H/O cardiac arrest on 4/16/2021.  2.  H/O STEMI on 4/15/2021.  3.  CAD with NSTEMI on 7/16/2021 with transfer to [place]. CABG x1 (LIMA - Ramus) with Dr. Lester.  4.  Hypertension-controlled.  5.  Morbid obesity.  6.  DM-2 with A1c of 5.6% on 8/10/2021.    7.  Hypertriglyceridemia-controlled.  8.  H/O CVA on 11/16/2020.  9.  Systolic and diastolic heart failure with EF of 34% on 7/15/2021.  10.  (+) COVID-19 on 11/9/2020.  11.  ESRD on hemodialysis T, Th, and Sat.  12.  KEISHA.  13.  Mild pulmonary hypertension.  14.  PAD.  15.  Bilateral carotid artery stenosis without significant disease on 7/15/2021.  16.  H/O DPN 4/16/2021.  17.  Left foot ulcer.  18.  Left-sided weakness.  19.  COPD-PFT's pending.  20.  Vitamin D/B12 deficiency-resolved.  21.  H/O tobacco abuse at a pack a day quitting in 1991.  22.  Atrial fibrillation on Coumadin and Coreg.  23.  CHADSVASC score of 7.      Assessment/Plan:    1.  He is on dialysis but states produces significant urine.  He does have significant peripheral edema.  Increase Lasix 40 mg daily to 80 mg in the.  Will discontinue if able to remove more fluid from dialysis or edema improves.  3.  Aspirin 81 mg for life.  4.  Salt restriction, fluid restriction, and daily weight  5.  He is okay to drive and left really has been greater than 3 months since his procedure.  6.  Stop hydrochlorothiazide 25 mg daily.  7.  Increase Coreg from 3.125 milligrams twice daily to 6.25 mg twice a day.  8.  Echo in 3 months.  9.  Follow-up in 3 months after echocardiogram.      Interval history:  Miguel presented to the ER and was transfer to [place] on 7/16/2021. He had presented to the ER  with diaphoresis, right-sided chest pain, and significantly elevated troponin consistent with NNSTEMI.  Previously, he had cardiac catheterization on 4/16/2021 and was  determined to need bypass.  He was set up with Dr. Toro via telemedicine for consideration of bypass at the [facility].  He was scheduled for surgery.  However, he states he did not feel well with nausea, chest discomfort, and thought he was having heatstroke on 7/16/2021.  He was seen in the ER and found to have a troponin of 832.2.  He was flown via fixed wing air plane to [place] as the other hospitals were on divert. He had CABG x1 on 7/22/2021 with DILLON to LAD by Dr. Lester at [place].  Sounds like his course was rather uneventful.  He is here in follow-up.      As you may recall, he previously was admitted to [facility] for V. fib arrest. He had a cardiac catheterization showing multiple vessel disease.  He was felt to be too unstable to have bypass.  He was given the option of following up with [facility] or the [facility] as he has been cared for by [facility] and he had chosen the [facility].     He is doing well.  He has no complaints.  His wound to his chest has healed well.  He is not having any chest pain or chest tightness.  He has had no recurrent stroke symptoms.  He continues with swelling to his legs.  We will increase Lasix as described above.  Medication changes were made.  We will plan for an echocardiogram in 3 months and follow-up after.        HPI:    Miguel Parisi is being seen by cardiology in follow-up to hospitalization for STEMI and V. fib arrest.  He was released on 6/17/2021 from inpatient rehab.  He has a rather complex history.     His history includes cardiac arrest on 4/16/2021 secondary to V. fib/V. tach, STEMI on 4/15/2021 no stent placed needs CABG, CAD, hypertension, obesity, DM-2 with A1c of 7.7% on 2/22/2021, hypertriglyceridemia, history of CVA in 11/16/2020, both systolic and diastolic heart failure with an EF of 40% in 4/25/2021, history of COVID-19 on 11/9/2020 (asymptomatic basically per wife/ never hospitalized for COVID resp symptoms), end-stage renal disease on  dialysis, KEISHA without treatment, mild to moderate pulmonary hypertension, PAD with history of right peroneal artery bypass secondary to ulcer, bilateral carotid arteries, history of ITP on 4/16/2021, left-sided weakness with history of lacunar infarct on 4/16/2020, and suspected COPD.       He was admitted on 4/16/2021 to [facility] from ED to the cath lab with STEMI and out of hospital cardiac arrest, shock x2 times in the field for VT/VF.  Reportedly, he works at a gas station.  He was called as there was a grass fire at his home.  He left work to check on the wildfire.  He states the fire department and ambulance were both there.  He was heading back to work walking across the lawn when he collapsed suddenly to the ground.  Thankfully, the ambulance was there.  They were able to provide CPR.  He was taken by helicopter from their property to [facility] in [place].  He was ultimately discharged to inpatient cardiac rehab being discharged on 6/17/2021.      He is underwent TTM with icy cath, had cardiogenic shock vs early concomitant septic shock from asp PNA, required IMPELLA support post arrest, noted severe MV-CAD on C.  He was felt to need CABG but needed to be medically stabilize/rehabilitated prior to that.  No stent was placed.  He had multiple echocardiograms with his EF as low as 30-35% on 4/17/2021.  On 4/25/2021, he was noted to have an EF of 40% with grade 1 diastolic dysfunction.     He also has a history of a stroke.  He has left-sided weakness involving both left upper and lower extremity.  He was noted have a lacunar infarct on imaging from 11/16/2020.  This involve the basal ganglia and thalamus.     He also has history of PAD.  He underwent right peritoneal SFA bypass.     He also has concerns for KEISHA.  It has not been treated.  He was referred to sleep medicine on 6/17/2021.     As mentioned, he does have both systolic and diastolic heart failure.  After his cardiac arrest, he had an  echo on 4/17/2021 that showed an EF of 30-35% through [facility].  It improved to 40 to 45% on 4/27/2021.  Most recently, his EF was 40% on 4/25/2021.     He apparently has history of carotid artery stenosis.  I am uncertain of the severity.  And ultrasound of his carotids were ordered on 6/17/2021 with history of stroke.  Also, a ultrasound of the abdomen for AAA was ordered as he does have a history of tobacco abuse quitting in 1991 at a pack a day.      Relevant testing:  ECHO on 4/25/21 at [facility]:  Left ventricular function is difficult to assess in the setting of frequent PAC's and PVC's overall mildly depressed in the absence of arrhythmia.  There are regional wall motion abnormalities as specified.  The left ventricular diastolic function is mildly abnormal (Grade I) with indeterminate filling pressures.  Mild aortic, mitral and tricuspid insufficiency.  There is moderate pulmonary hypertension.  There is no pericardial effusion.  There is no left ventricular mural thrombus.  Compared to echocardiogram dated 4/20/21, left ventricular function is generally similar.  Qualitative left ventricle ejection fraction is 40%.     ECHO on 4/20/21:  A two-dimensional transthoracic echocardiogram was performed in limited views only.  A contrast agent was injected intravenously to improve image resolution.  Technically difficult study.  Qualitative ejection fraction is 40-45% (mildly reduced).  The left ventricle is normal size.  The left ventricular wall thickness could not be accurately estimated due to technical limitations.  There are regional wall motion abnormalities as specified.  Grossly normal right ventricular size.  Probably normal right ventricular function.     ECHO on 4/17/21:  Limited echo for Impella placement.    Qualitative ejection fraction is 30-35% (moderately reduced).  Impella device is seen within LV cavity. Echo-bright inlet marker is 4.1 cm below the aortic valve leaflets.  Inferior  vena cava size enlarged and collapsibility < 50% indicating elevated right atrial pressure (15 mm Hg).     Cardiac cath on 4/16/21 at [facility]:  65 yom with VT/VF arrest, immediate bystander CPR, ROSC with combativeness  leading to intubation and sedation for airway protection in the field.  EKG showing RBBB.    MV CAD with Severe LM, pLAD, mLAD, d1, and LCx disease.  RCA .  Minimally elevated.  Cooling catheter placed.  IABP initially placed with borderline hemodynamic parameters, removed and   upgraded to Impella which provided good support.     CT angio aorta abdomen on 5/3/16:  IMPRESSION: Patient has high popliteal occlusion on the right with low left popliteal artery occlusion. There does appear to be one vessel collateral flow to the right ankle with markedly diseased left ankle vessels with only minimal collateral flow.        ICD-10-CM    1. Paroxysmal atrial fibrillation (H)  I48.0 EKG 12-lead, tracing only     Echocardiogram Complete   2. Benign essential hypertension  I10 furosemide (LASIX) 80 MG tablet     carvedilol (COREG) 6.25 MG tablet     Echocardiogram Complete   3. ESRD (end stage renal disease) on dialysis (H)  N18.6 furosemide (LASIX) 80 MG tablet    Z99.2    4. Mild pulmonary hypertension (H)  I27.20 furosemide (LASIX) 80 MG tablet   5. Chronic combined systolic and diastolic congestive heart failure (H)  I50.42 furosemide (LASIX) 80 MG tablet     carvedilol (COREG) 6.25 MG tablet     Echocardiogram Complete   6. Clinical diagnosis of COVID-19 on 11/9/2020  U07.1    7. History of tobacco abuse  Z87.891    8. H/O NSTEMI on 7/16/2021  I25.2    9. H/O STEMI on 4/15/2021  I25.2    10. History of cardiac arrest  Z86.74    11. History of coronary artery bypass surgery  Z95.1    12. Atherosclerotic cardiovascular disease  I25.10    13. Coronary artery disease involving coronary bypass graft of native heart without angina pectoris  I25.810    14. Generalized ischemic myocardial dysfunction   I25.5    15. History of sudden cardiac arrest successfully resuscitated  Z86.74    16. PAD (peripheral artery disease) (H)  I73.9    17. Mixed hyperlipidemia  E78.2    18. Hyperphosphatemia  E83.39    19. Morbid obesity (H)  E66.01    20. Obstructive sleep apnea syndrome  G47.33    21. Abnormality of gait as late effect of cerebrovascular accident (CVA)  I69.398     R26.9    22. History of cerebrovascular accident  Z86.73    23. History of ITP on 4/16/2021  Z86.2    24. History of stroke  Z86.73    25. Impaired balance as late effect of cerebrovascular accident (CVA)  I69.398     R26.89    26. Tobacco abuse counseling  Z71.6    27. Blood coagulation defect (H)  D68.9    28. End stage renal disease on dialysis (H)  N18.6     Z99.2    29. Atherosclerosis of native coronary artery of native heart without angina pectoris  I25.10    30. Bilateral carotid artery stenosis  I65.23    31. Chronic combined systolic (congestive) and diastolic (congestive) heart failure (H)  I50.42 carvedilol (COREG) 6.25 MG tablet     Echocardiogram Complete   32. Weakness of left side of body  R53.1    33. Coronary artery disease involving native coronary artery of native heart without angina pectoris  I25.10 carvedilol (COREG) 6.25 MG tablet       No past medical history on file.    No past surgical history on file.    Allergies   Allergen Reactions     Metformin Diarrhea       Current Outpatient Medications   Medication Sig Dispense Refill     acetaminophen (TYLENOL) 325 MG tablet Take 325-650 mg by mouth every 4 hours as needed        aspirin (ASA) 81 MG chewable tablet Take 81 mg by mouth daily       atorvastatin (LIPITOR) 80 MG tablet Take 80 mg by mouth At Bedtime        carvedilol (COREG) 6.25 MG tablet Take 1 tablet (6.25 mg) by mouth 2 times daily (with meals) 180 tablet 1     clopidogrel (PLAVIX) 75 MG tablet Take 1 tablet (75 mg) by mouth every morning 90 tablet 0     furosemide (LASIX) 80 MG tablet Take 1 tablet (80 mg) by mouth  daily 90 tablet 3     insulin aspart (NOVOLOG PEN) 100 UNIT/ML pen Inject 3 Units Subcutaneous 3 times daily (before meals)        melatonin 3 MG tablet Take 1 mg by mouth At Bedtime       polyethylene glycol (MIRALAX) 17 g packet Take 1 packet by mouth daily as needed       warfarin ANTICOAGULANT (COUMADIN) 2 MG tablet 4 mg x 6 days and 6 mg x 1 day/week or as directed by [facility] clinic         Social History     Socioeconomic History     Marital status:      Spouse name: Not on file     Number of children: Not on file     Years of education: Not on file     Highest education level: Not on file   Occupational History     Not on file   Tobacco Use     Smoking status: Former Smoker     Packs/day: 1.00     Types: Cigarettes     Quit date: 1990     Years since quittin.7     Smokeless tobacco: Never Used   Substance and Sexual Activity     Alcohol use: Never     Drug use: Not on file     Sexual activity: Not on file   Other Topics Concern     Parent/sibling w/ CABG, MI or angioplasty before 65F 55M? Not Asked   Social History Narrative     Not on file     Social Determinants of Health     Financial Resource Strain:      Difficulty of Paying Living Expenses:    Food Insecurity:      Worried About Running Out of Food in the Last Year:      Ran Out of Food in the Last Year:    Transportation Needs:      Lack of Transportation (Medical):      Lack of Transportation (Non-Medical):    Physical Activity:      Days of Exercise per Week:      Minutes of Exercise per Session:    Stress:      Feeling of Stress :    Social Connections:      Frequency of Communication with Friends and Family:      Frequency of Social Gatherings with Friends and Family:      Attends Oriental orthodox Services:      Active Member of Clubs or Organizations:      Attends Club or Organization Meetings:      Marital Status:    Intimate Partner Violence:      Fear of Current or Ex-Partner:      Emotionally Abused:      Physically Abused:       Sexually Abused:        LAB RESULTS:   Anticoagulation Therapy Visit on 08/16/2021   Component Date Value Ref Range Status     INR (External) 08/16/2021 1.3* 0.9 - 1.1 Final   Anticoagulation Therapy Visit on 08/13/2021   Component Date Value Ref Range Status     INR (External) 08/13/2021 1.3* 0.9 - 1.1 Corrected   Lab on 08/10/2021   Component Date Value Ref Range Status     Vitamin D, Total (25-Hydroxy) 08/10/2021 16* 30 - 100 ug/L Final     Folic Acid 08/10/2021 4.7* >=5.2 ng/mL Final     Vitamin B12 08/10/2021 535  180 - 914 pg/mL Final     TSH 08/10/2021 1.87  0.40 - 4.00 mU/L Final     N Terminal Pro BNP Outpatient 08/10/2021 2,452* 0 - 100 pg/mL Final     Magnesium 08/10/2021 1.7* 1.9 - 2.7 mg/dL Final     Cholesterol 08/10/2021 131  <200 mg/dL Final     Triglycerides 08/10/2021 126  <150 mg/dL Final     Direct Measure HDL 08/10/2021 41  23 - 92 mg/dL Final     LDL Cholesterol Calculated 08/10/2021 65  <=100 mg/dL Final     Non HDL Cholesterol 08/10/2021 90  <130 mg/dL Final     Patient Fasting > 8hrs? 08/10/2021 Unknown   Final     Hemoglobin A1C 08/10/2021 5.6  4.0 - 6.2 % Final     Sodium 08/10/2021 137  134 - 144 mmol/L Final     Potassium 08/10/2021 3.8  3.5 - 5.1 mmol/L Final     Chloride 08/10/2021 96* 98 - 107 mmol/L Final     Carbon Dioxide (CO2) 08/10/2021 31  21 - 31 mmol/L Final     Anion Gap 08/10/2021 10  3 - 14 mmol/L Final     Urea Nitrogen 08/10/2021 16  7 - 25 mg/dL Final     Creatinine 08/10/2021 2.83* 0.70 - 1.30 mg/dL Final     Calcium 08/10/2021 9.2  8.6 - 10.3 mg/dL Final     Glucose 08/10/2021 142* 70 - 105 mg/dL Final     Alkaline Phosphatase 08/10/2021 79  34 - 104 U/L Final     AST 08/10/2021 11* 13 - 39 U/L Final     ALT 08/10/2021 7  7 - 52 U/L Final     Protein Total 08/10/2021 7.1  6.4 - 8.9 g/dL Final     Albumin 08/10/2021 3.8  3.5 - 5.7 g/dL Final     Bilirubin Total 08/10/2021 0.5  0.3 - 1.0 mg/dL Final     GFR Estimate 08/10/2021 22* >60 mL/min/1.73m2 Final     WBC Count  08/10/2021 7.2  4.0 - 11.0 10e3/uL Final     RBC Count 08/10/2021 3.59* 4.40 - 5.90 10e6/uL Final     Hemoglobin 08/10/2021 10.4* 13.3 - 17.7 g/dL Final     Hematocrit 08/10/2021 33.4* 40.0 - 53.0 % Final     MCV 08/10/2021 93  78 - 100 fL Final     MCH 08/10/2021 29.0  26.5 - 33.0 pg Final     MCHC 08/10/2021 31.1* 31.5 - 36.5 g/dL Final     RDW 08/10/2021 15.7* 10.0 - 15.0 % Final     Platelet Count 08/10/2021 294  150 - 450 10e3/uL Final   Anticoagulation Therapy Visit on 08/10/2021   Component Date Value Ref Range Status     INR (External) 08/09/2021 1.2* 0.9 - 1.1 Final   Office Visit on 08/02/2021   Component Date Value Ref Range Status     Sodium 08/02/2021 138  134 - 144 mmol/L Final     Potassium 08/02/2021 4.4  3.5 - 5.1 mmol/L Final     Chloride 08/02/2021 97* 98 - 107 mmol/L Final     Carbon Dioxide (CO2) 08/02/2021 27  21 - 31 mmol/L Final     Anion Gap 08/02/2021 14  3 - 14 mmol/L Final     Urea Nitrogen 08/02/2021 37* 7 - 25 mg/dL Final     Creatinine 08/02/2021 6.19* 0.70 - 1.30 mg/dL Final     Calcium 08/02/2021 9.1  8.6 - 10.3 mg/dL Final     Glucose 08/02/2021 117* 70 - 105 mg/dL Final     Albumin 08/02/2021 3.3* 3.5 - 5.7 g/dL Final     Phosphorus 08/02/2021 5.1* 2.5 - 5.0 mg/dL Final     GFR Estimate 08/02/2021 9* >60 mL/min/1.73m2 Final     WBC Count 08/02/2021 10.2  4.0 - 11.0 10e3/uL Final     RBC Count 08/02/2021 3.06* 4.40 - 5.90 10e6/uL Final     Hemoglobin 08/02/2021 9.0* 13.3 - 17.7 g/dL Final     Hematocrit 08/02/2021 28.4* 40.0 - 53.0 % Final     MCV 08/02/2021 93  78 - 100 fL Final     MCH 08/02/2021 29.4  26.5 - 33.0 pg Final     MCHC 08/02/2021 31.7  31.5 - 36.5 g/dL Final     RDW 08/02/2021 15.9* 10.0 - 15.0 % Final     Platelet Count 08/02/2021 261  150 - 450 10e3/uL Final   Office Visit on 06/28/2021   Component Date Value Ref Range Status     Cholesterol 06/28/2021 127  <200 mg/dL Final     Triglycerides 06/28/2021 135  <150 mg/dL Final     HDL Cholesterol 06/28/2021 37  23 - 92  mg/dL Final     LDL Cholesterol Calculated 06/28/2021 63  <100 mg/dL Final     Non HDL Cholesterol 06/28/2021 90  <130 mg/dL Final     Thyrotropin 06/28/2021 1.61  0.34 - 5.60 IU/mL Final     Hepatitis C Antibody 06/28/2021 Nonreactive  NR^Nonreactive Final     Sodium 06/28/2021 140  134 - 144 mmol/L Final     Potassium 06/28/2021 5.1  3.5 - 5.1 mmol/L Final     Chloride 06/28/2021 99  98 - 107 mmol/L Final     Carbon Dioxide 06/28/2021 28  21 - 31 mmol/L Final     Anion Gap 06/28/2021 13  3 - 14 mmol/L Final     Glucose 06/28/2021 81  70 - 105 mg/dL Final     Urea Nitrogen 06/28/2021 59* 7 - 25 mg/dL Final     Creatinine 06/28/2021 8.12* 0.70 - 1.30 mg/dL Final     GFR Estimate 06/28/2021 7* >60 mL/min/[1.73_m2] Final     GFR Estimate If Black 06/28/2021 8* >60 mL/min/[1.73_m2] Final     Calcium 06/28/2021 9.8  8.6 - 10.3 mg/dL Final     Bilirubin Total 06/28/2021 0.5  0.3 - 1.0 mg/dL Final     Albumin 06/28/2021 3.9  3.5 - 5.7 g/dL Final     Protein Total 06/28/2021 7.1  6.4 - 8.9 g/dL Final     Alkaline Phosphatase 06/28/2021 70  34 - 104 U/L Final     ALT 06/28/2021 8  7 - 52 U/L Final     AST 06/28/2021 11* 13 - 39 U/L Final     WBC 06/28/2021 7.5  4.0 - 11.0 10e9/L Final     RBC Count 06/28/2021 3.72* 4.4 - 5.9 10e12/L Final     Hemoglobin 06/28/2021 10.1* 13.3 - 17.7 g/dL Final     Hematocrit 06/28/2021 32.9* 40.0 - 53.0 % Final     MCV 06/28/2021 88  78 - 100 fl Final     MCH 06/28/2021 27.2  26.5 - 33.0 pg Final     MCHC 06/28/2021 30.7* 31.5 - 36.5 g/dL Final     RDW 06/28/2021 17.9* 10.0 - 15.0 % Final     Platelet Count 06/28/2021 190  150 - 450 10e9/L Final     Diff Method 06/28/2021 Automated Method   Final     % Neutrophils 06/28/2021 60.4  % Final     % Lymphocytes 06/28/2021 26.1  % Final     % Monocytes 06/28/2021 8.4  % Final     % Eosinophils 06/28/2021 3.7  % Final     % Basophils 06/28/2021 0.7  % Final     % Immature Granulocytes 06/28/2021 0.7  % Final     Absolute Neutrophil 06/28/2021 4.5   1.6 - 8.3 10e9/L Final     Absolute Lymphocytes 06/28/2021 2.0  0.8 - 5.3 10e9/L Final     Absolute Monocytes 06/28/2021 0.6  0.0 - 1.3 10e9/L Final     Absolute Eosinophils 06/28/2021 0.3  0.0 - 0.7 10e9/L Final     Absolute Basophils 06/28/2021 0.1  0.0 - 0.2 10e9/L Final     Abs Immature Granulocytes 06/28/2021 0.1  0 - 0.4 10e9/L Final     Hemoglobin A1C 06/28/2021 6.1* 4.0 - 6.0 % Final        Review of systems: Negative except that which was noted in the HPI.    Physical examination:  /68 (BP Location: Right arm, Patient Position: Sitting, Cuff Size: Adult Regular)   Pulse 75   Temp 97.3  F (36.3  C) (Tympanic)   Resp 12   Wt 94.3 kg (207 lb 12.8 oz)   SpO2 100%   BMI 29.00 kg/m      GENERAL APPEARANCE: healthy, alert and no distress  HEENT: no icterus, no xanthelasmas, normal pupil size and reaction, no cyanosis.  NECK: no adenopathy, no asymmetry, masses.  CHEST: lungs clear to auscultation - no rales, rhonchi or wheezes, no use of accessory muscles, no retractions, respirations are unlabored, normal respiratory rate  CARDIOVASCULAR: regular rhythm, normal S1 with physiologic split S2, no S3 or S4 and no murmur, click or rub.  Chest wall healing well.  No evidence of infection, bleeding, or drainage.  EXTREMITIES: no clubbing, cyanosis but with mild to moderate edema  NEURO: alert and oriented normal speech, and affect  VASC: No vascular bruits heard.  SKIN: no ecchymoses, no rashes    Total time spent on day of visit, including review of tests, obtaining/reviewing separately obtained history, ordering medications/tests/procedures, communicating with PCP/consultants, and documenting in electronic medical record: 90 minutes.         Thank you for allowing me to participate in the care of your patient. Please do not hesitate to contact me if you have any questions.     Nick Wallace, DO

## (undated) RX ORDER — ASPIRIN 325 MG
TABLET ORAL
Status: DISPENSED
Start: 2021-07-16

## (undated) RX ORDER — NITROGLYCERIN 0.4 MG/1
TABLET SUBLINGUAL
Status: DISPENSED
Start: 2021-07-17

## (undated) RX ORDER — HEPARIN SODIUM 5000 [USP'U]/.5ML
INJECTION, SOLUTION INTRAVENOUS; SUBCUTANEOUS
Status: DISPENSED
Start: 2021-07-17

## (undated) RX ORDER — CLOPIDOGREL BISULFATE 75 MG/1
TABLET ORAL
Status: DISPENSED
Start: 2021-07-16

## (undated) RX ORDER — PIPERACILLIN SODIUM, TAZOBACTAM SODIUM 2; .25 G/10ML; G/10ML
INJECTION, POWDER, LYOPHILIZED, FOR SOLUTION INTRAVENOUS
Status: DISPENSED
Start: 2022-02-22

## (undated) RX ORDER — HEPARIN SODIUM 10000 [USP'U]/100ML
INJECTION, SOLUTION INTRAVENOUS
Status: DISPENSED
Start: 2021-07-17

## (undated) RX ORDER — CALCIUM GLUCONATE 94 MG/ML
INJECTION, SOLUTION INTRAVENOUS
Status: DISPENSED
Start: 2021-07-16

## (undated) RX ORDER — PIPERACILLIN SODIUM, TAZOBACTAM SODIUM 4; .5 G/20ML; G/20ML
INJECTION, POWDER, LYOPHILIZED, FOR SOLUTION INTRAVENOUS
Status: DISPENSED
Start: 2023-01-01

## (undated) RX ORDER — CALCIUM GLUCONATE 94 MG/ML
INJECTION, SOLUTION INTRAVENOUS
Status: DISPENSED
Start: 2023-01-01

## (undated) RX ORDER — HYDROMORPHONE HYDROCHLORIDE 1 MG/ML
INJECTION, SOLUTION INTRAMUSCULAR; INTRAVENOUS; SUBCUTANEOUS
Status: DISPENSED
Start: 2023-01-01

## (undated) RX ORDER — SODIUM CHLORIDE, SODIUM LACTATE, POTASSIUM CHLORIDE, CALCIUM CHLORIDE 600; 310; 30; 20 MG/100ML; MG/100ML; MG/100ML; MG/100ML
INJECTION, SOLUTION INTRAVENOUS
Status: DISPENSED
Start: 2022-02-22

## (undated) RX ORDER — ONDANSETRON 2 MG/ML
INJECTION INTRAMUSCULAR; INTRAVENOUS
Status: DISPENSED
Start: 2021-07-17

## (undated) RX ORDER — PIPERACILLIN SODIUM, TAZOBACTAM SODIUM 4; .5 G/20ML; G/20ML
INJECTION, POWDER, LYOPHILIZED, FOR SOLUTION INTRAVENOUS
Status: DISPENSED
Start: 2022-02-22